# Patient Record
Sex: FEMALE | Race: BLACK OR AFRICAN AMERICAN | Employment: FULL TIME | URBAN - METROPOLITAN AREA
[De-identification: names, ages, dates, MRNs, and addresses within clinical notes are randomized per-mention and may not be internally consistent; named-entity substitution may affect disease eponyms.]

---

## 2017-02-10 PROBLEM — O09.899 H/O PRETERM DELIVERY, CURRENTLY PREGNANT: Status: ACTIVE | Noted: 2017-02-10

## 2017-03-10 PROBLEM — O34.32 CERVICAL FUNNELING AFFECTING PREGNANCY IN SECOND TRIMESTER: Status: ACTIVE | Noted: 2017-03-10

## 2017-03-10 PROBLEM — O26.872 SHORT CERVIX DURING PREGNANCY IN SECOND TRIMESTER: Status: ACTIVE | Noted: 2017-03-10

## 2017-03-10 PROBLEM — O34.219 PREVIOUS CESAREAN SECTION COMPLICATING PREGNANCY, ANTEPARTUM CONDITION OR COMPLICATION: Status: ACTIVE | Noted: 2017-03-10

## 2017-03-10 PROBLEM — O47.00 PREMATURE UTERINE CONTRACTIONS: Status: ACTIVE | Noted: 2017-03-10

## 2017-04-07 PROBLEM — O09.299 NEONATAL DEATH IN PRIOR PREGNANCY, CURRENTLY PREGNANT: Status: ACTIVE | Noted: 2017-04-07

## 2017-04-14 PROBLEM — O26.873 SHORT CERVIX DURING PREGNANCY IN THIRD TRIMESTER: Status: ACTIVE | Noted: 2017-04-14

## 2017-04-14 PROBLEM — O34.32 CERVICAL FUNNELING AFFECTING PREGNANCY IN SECOND TRIMESTER: Status: ACTIVE | Noted: 2017-04-14

## 2017-04-14 PROBLEM — O47.00 PREMATURE UTERINE CONTRACTIONS CAUSING THREATENED PREMATURE LABOR: Status: ACTIVE | Noted: 2017-04-14

## 2017-05-12 PROBLEM — O26.873 SHORT CERVIX, THIRD TRIMESTER: Status: ACTIVE | Noted: 2017-05-12

## 2018-06-26 ENCOUNTER — APPOINTMENT (OUTPATIENT)
Dept: ULTRASOUND IMAGING | Age: 23
End: 2018-06-26
Payer: COMMERCIAL

## 2018-06-26 ENCOUNTER — HOSPITAL ENCOUNTER (EMERGENCY)
Age: 23
Discharge: HOME | End: 2018-06-27
Payer: COMMERCIAL

## 2018-06-26 DIAGNOSIS — O03.4 INCOMPLETE ABORTION: Primary | ICD-10-CM

## 2018-06-26 LAB
ABO/RH: NORMAL
ALBUMIN SERPL-MCNC: 4.6 G/DL (ref 3.5–5.2)
ALP BLD-CCNC: 167 U/L (ref 35–104)
ALT SERPL-CCNC: 11 U/L (ref 0–32)
ANION GAP SERPL CALCULATED.3IONS-SCNC: 14 MMOL/L (ref 7–16)
AST SERPL-CCNC: 18 U/L (ref 0–31)
BASOPHILS ABSOLUTE: 0.04 E9/L (ref 0–0.2)
BASOPHILS RELATIVE PERCENT: 0.5 % (ref 0–2)
BILIRUB SERPL-MCNC: <0.2 MG/DL (ref 0–1.2)
BUN BLDV-MCNC: 11 MG/DL (ref 6–20)
CALCIUM SERPL-MCNC: 9.1 MG/DL (ref 8.6–10.2)
CHLORIDE BLD-SCNC: 100 MMOL/L (ref 98–107)
CO2: 24 MMOL/L (ref 22–29)
CREAT SERPL-MCNC: 0.8 MG/DL (ref 0.5–1)
EOSINOPHILS ABSOLUTE: 0.08 E9/L (ref 0.05–0.5)
EOSINOPHILS RELATIVE PERCENT: 1.1 % (ref 0–6)
GFR AFRICAN AMERICAN: >60
GFR NON-AFRICAN AMERICAN: >60 ML/MIN/1.73
GLUCOSE BLD-MCNC: 100 MG/DL (ref 74–109)
GONADOTROPIN, CHORIONIC (HCG) QUANT: 3113 MIU/ML
HCT VFR BLD CALC: 36.6 % (ref 34–48)
HEMOGLOBIN: 12.4 G/DL (ref 11.5–15.5)
IMMATURE GRANULOCYTES #: 0.02 E9/L
IMMATURE GRANULOCYTES %: 0.3 % (ref 0–5)
LYMPHOCYTES ABSOLUTE: 2.74 E9/L (ref 1.5–4)
LYMPHOCYTES RELATIVE PERCENT: 37.6 % (ref 20–42)
MCH RBC QN AUTO: 29.5 PG (ref 26–35)
MCHC RBC AUTO-ENTMCNC: 33.9 % (ref 32–34.5)
MCV RBC AUTO: 87.1 FL (ref 80–99.9)
MONOCYTES ABSOLUTE: 0.41 E9/L (ref 0.1–0.95)
MONOCYTES RELATIVE PERCENT: 5.6 % (ref 2–12)
NEUTROPHILS ABSOLUTE: 3.99 E9/L (ref 1.8–7.3)
NEUTROPHILS RELATIVE PERCENT: 54.9 % (ref 43–80)
PDW BLD-RTO: 14.6 FL (ref 11.5–15)
PLATELET # BLD: 257 E9/L (ref 130–450)
PMV BLD AUTO: 9.5 FL (ref 7–12)
POTASSIUM SERPL-SCNC: 4 MMOL/L (ref 3.5–5)
RBC # BLD: 4.2 E12/L (ref 3.5–5.5)
SODIUM BLD-SCNC: 138 MMOL/L (ref 132–146)
TOTAL PROTEIN: 8 G/DL (ref 6.4–8.3)
WBC # BLD: 7.3 E9/L (ref 4.5–11.5)

## 2018-06-26 PROCEDURE — 86901 BLOOD TYPING SEROLOGIC RH(D): CPT

## 2018-06-26 PROCEDURE — 84702 CHORIONIC GONADOTROPIN TEST: CPT

## 2018-06-26 PROCEDURE — 85025 COMPLETE CBC W/AUTO DIFF WBC: CPT

## 2018-06-26 PROCEDURE — 36415 COLL VENOUS BLD VENIPUNCTURE: CPT

## 2018-06-26 PROCEDURE — 80053 COMPREHEN METABOLIC PANEL: CPT

## 2018-06-26 PROCEDURE — 99284 EMERGENCY DEPT VISIT MOD MDM: CPT

## 2018-06-26 PROCEDURE — 96374 THER/PROPH/DIAG INJ IV PUSH: CPT

## 2018-06-26 PROCEDURE — 76817 TRANSVAGINAL US OBSTETRIC: CPT

## 2018-06-26 PROCEDURE — 2580000003 HC RX 258: Performed by: NURSE PRACTITIONER

## 2018-06-26 PROCEDURE — 86900 BLOOD TYPING SEROLOGIC ABO: CPT

## 2018-06-26 PROCEDURE — 96375 TX/PRO/DX INJ NEW DRUG ADDON: CPT

## 2018-06-26 PROCEDURE — 6360000002 HC RX W HCPCS: Performed by: NURSE PRACTITIONER

## 2018-06-26 RX ORDER — DIPHENHYDRAMINE HYDROCHLORIDE 50 MG/ML
12.5 INJECTION INTRAMUSCULAR; INTRAVENOUS ONCE
Status: DISCONTINUED | OUTPATIENT
Start: 2018-06-26 | End: 2018-06-27 | Stop reason: HOSPADM

## 2018-06-26 RX ORDER — MORPHINE SULFATE 2 MG/ML
2 INJECTION, SOLUTION INTRAMUSCULAR; INTRAVENOUS ONCE
Status: DISCONTINUED | OUTPATIENT
Start: 2018-06-26 | End: 2018-06-26

## 2018-06-26 RX ORDER — 0.9 % SODIUM CHLORIDE 0.9 %
1000 INTRAVENOUS SOLUTION INTRAVENOUS ONCE
Status: COMPLETED | OUTPATIENT
Start: 2018-06-26 | End: 2018-06-26

## 2018-06-26 RX ORDER — ONDANSETRON 2 MG/ML
4 INJECTION INTRAMUSCULAR; INTRAVENOUS ONCE
Status: DISCONTINUED | OUTPATIENT
Start: 2018-06-26 | End: 2018-06-26

## 2018-06-26 RX ORDER — MORPHINE SULFATE 4 MG/ML
4 INJECTION, SOLUTION INTRAMUSCULAR; INTRAVENOUS ONCE
Status: COMPLETED | OUTPATIENT
Start: 2018-06-26 | End: 2018-06-26

## 2018-06-26 RX ORDER — ONDANSETRON 2 MG/ML
4 INJECTION INTRAMUSCULAR; INTRAVENOUS ONCE
Status: COMPLETED | OUTPATIENT
Start: 2018-06-26 | End: 2018-06-26

## 2018-06-26 RX ADMIN — ONDANSETRON 4 MG: 2 INJECTION INTRAMUSCULAR; INTRAVENOUS at 23:53

## 2018-06-26 RX ADMIN — SODIUM CHLORIDE 1000 ML: 9 INJECTION, SOLUTION INTRAVENOUS at 21:08

## 2018-06-26 RX ADMIN — MORPHINE SULFATE 4 MG: 4 INJECTION INTRAVENOUS at 23:53

## 2018-06-26 ASSESSMENT — PAIN DESCRIPTION - LOCATION: LOCATION: ABDOMEN

## 2018-06-26 ASSESSMENT — PAIN SCALES - GENERAL
PAINLEVEL_OUTOF10: 10
PAINLEVEL_OUTOF10: 10

## 2018-06-26 ASSESSMENT — PAIN DESCRIPTION - ORIENTATION: ORIENTATION: LOWER

## 2018-06-26 ASSESSMENT — PAIN DESCRIPTION - PAIN TYPE: TYPE: ACUTE PAIN

## 2018-06-27 VITALS
HEART RATE: 83 BPM | OXYGEN SATURATION: 100 % | TEMPERATURE: 98.3 F | SYSTOLIC BLOOD PRESSURE: 125 MMHG | DIASTOLIC BLOOD PRESSURE: 88 MMHG | RESPIRATION RATE: 16 BRPM

## 2018-06-27 PROCEDURE — 6370000000 HC RX 637 (ALT 250 FOR IP): Performed by: NURSE PRACTITIONER

## 2018-06-27 PROCEDURE — 6360000002 HC RX W HCPCS: Performed by: NURSE PRACTITIONER

## 2018-06-27 PROCEDURE — 96372 THER/PROPH/DIAG INJ SC/IM: CPT

## 2018-06-27 PROCEDURE — 96376 TX/PRO/DX INJ SAME DRUG ADON: CPT

## 2018-06-27 RX ORDER — MISOPROSTOL 200 UG/1
200 TABLET ORAL ONCE
Status: COMPLETED | OUTPATIENT
Start: 2018-06-27 | End: 2018-06-27

## 2018-06-27 RX ORDER — MORPHINE SULFATE 2 MG/ML
2 INJECTION, SOLUTION INTRAMUSCULAR; INTRAVENOUS ONCE
Status: COMPLETED | OUTPATIENT
Start: 2018-06-27 | End: 2018-06-27

## 2018-06-27 RX ORDER — MORPHINE SULFATE 2 MG/ML
INJECTION, SOLUTION INTRAMUSCULAR; INTRAVENOUS
Status: DISCONTINUED
Start: 2018-06-27 | End: 2018-06-27 | Stop reason: HOSPADM

## 2018-06-27 RX ORDER — OXYCODONE HYDROCHLORIDE AND ACETAMINOPHEN 5; 325 MG/1; MG/1
1 TABLET ORAL ONCE
Status: COMPLETED | OUTPATIENT
Start: 2018-06-27 | End: 2018-06-27

## 2018-06-27 RX ORDER — METHYLERGONOVINE MALEATE 0.2 MG/ML
200 INJECTION INTRAVENOUS ONCE
Status: COMPLETED | OUTPATIENT
Start: 2018-06-27 | End: 2018-06-27

## 2018-06-27 RX ORDER — ONDANSETRON 2 MG/ML
4 INJECTION INTRAMUSCULAR; INTRAVENOUS ONCE
Status: COMPLETED | OUTPATIENT
Start: 2018-06-27 | End: 2018-06-27

## 2018-06-27 RX ADMIN — Medication 200 MCG: at 01:12

## 2018-06-27 RX ADMIN — MORPHINE SULFATE 2 MG: 2 INJECTION, SOLUTION INTRAMUSCULAR; INTRAVENOUS at 02:45

## 2018-06-27 RX ADMIN — MORPHINE SULFATE 2 MG: 2 INJECTION, SOLUTION INTRAMUSCULAR; INTRAVENOUS at 02:09

## 2018-06-27 RX ADMIN — OXYCODONE HYDROCHLORIDE AND ACETAMINOPHEN 1 TABLET: 5; 325 TABLET ORAL at 03:47

## 2018-06-27 RX ADMIN — ONDANSETRON 4 MG: 2 INJECTION INTRAMUSCULAR; INTRAVENOUS at 02:09

## 2018-06-27 RX ADMIN — Medication 200 MCG: at 01:11

## 2018-06-27 ASSESSMENT — PAIN SCALES - GENERAL
PAINLEVEL_OUTOF10: 10
PAINLEVEL_OUTOF10: 10
PAINLEVEL_OUTOF10: 0
PAINLEVEL_OUTOF10: 10

## 2019-02-06 ENCOUNTER — ROUTINE PRENATAL (OUTPATIENT)
Dept: OBGYN CLINIC | Age: 24
End: 2019-02-06
Payer: COMMERCIAL

## 2019-02-06 VITALS
DIASTOLIC BLOOD PRESSURE: 64 MMHG | HEART RATE: 81 BPM | WEIGHT: 117 LBS | BODY MASS INDEX: 21.4 KG/M2 | SYSTOLIC BLOOD PRESSURE: 105 MMHG

## 2019-02-06 DIAGNOSIS — Z36.89 ENCOUNTER FOR FETAL ANATOMIC SURVEY: ICD-10-CM

## 2019-02-06 DIAGNOSIS — O34.32 CERVICAL FUNNELING AFFECTING PREGNANCY IN SECOND TRIMESTER: ICD-10-CM

## 2019-02-06 DIAGNOSIS — O34.219 PREVIOUS CESAREAN SECTION COMPLICATING PREGNANCY, ANTEPARTUM CONDITION OR COMPLICATION: ICD-10-CM

## 2019-02-06 DIAGNOSIS — O26.872 SHORT CERVIX DURING PREGNANCY IN SECOND TRIMESTER: Primary | ICD-10-CM

## 2019-02-06 DIAGNOSIS — Z34.90 PREGNANCY, UNSPECIFIED GESTATIONAL AGE: ICD-10-CM

## 2019-02-06 DIAGNOSIS — O09.899 H/O PRETERM DELIVERY, CURRENTLY PREGNANT: ICD-10-CM

## 2019-02-06 DIAGNOSIS — O09.299 NEONATAL DEATH IN PRIOR PREGNANCY, CURRENTLY PREGNANT: ICD-10-CM

## 2019-02-06 LAB
GLUCOSE URINE, POC: NORMAL
PROTEIN UA: ABNORMAL

## 2019-02-06 PROCEDURE — G8420 CALC BMI NORM PARAMETERS: HCPCS | Performed by: OBSTETRICS & GYNECOLOGY

## 2019-02-06 PROCEDURE — 76811 OB US DETAILED SNGL FETUS: CPT | Performed by: OBSTETRICS & GYNECOLOGY

## 2019-02-06 PROCEDURE — 81002 URINALYSIS NONAUTO W/O SCOPE: CPT | Performed by: OBSTETRICS & GYNECOLOGY

## 2019-02-06 PROCEDURE — G8484 FLU IMMUNIZE NO ADMIN: HCPCS | Performed by: OBSTETRICS & GYNECOLOGY

## 2019-02-06 PROCEDURE — 76817 TRANSVAGINAL US OBSTETRIC: CPT | Performed by: OBSTETRICS & GYNECOLOGY

## 2019-02-06 PROCEDURE — G8427 DOCREV CUR MEDS BY ELIG CLIN: HCPCS | Performed by: OBSTETRICS & GYNECOLOGY

## 2019-02-06 PROCEDURE — 99243 OFF/OP CNSLTJ NEW/EST LOW 30: CPT | Performed by: OBSTETRICS & GYNECOLOGY

## 2019-02-06 PROCEDURE — 99201 HC NEW PT, E/M LEVEL 1: CPT | Performed by: OBSTETRICS & GYNECOLOGY

## 2019-02-06 RX ORDER — FOLIC ACID 1 MG/1
TABLET ORAL
Refills: 0 | COMMUNITY
Start: 2018-10-31

## 2019-02-08 ENCOUNTER — TELEPHONE (OUTPATIENT)
Dept: OBGYN CLINIC | Age: 24
End: 2019-02-08

## 2019-02-19 ENCOUNTER — TELEPHONE (OUTPATIENT)
Dept: OBGYN CLINIC | Age: 24
End: 2019-02-19

## 2019-02-19 ENCOUNTER — ROUTINE PRENATAL (OUTPATIENT)
Dept: OBGYN CLINIC | Age: 24
End: 2019-02-19
Payer: COMMERCIAL

## 2019-02-19 VITALS
HEART RATE: 80 BPM | DIASTOLIC BLOOD PRESSURE: 64 MMHG | WEIGHT: 120 LBS | BODY MASS INDEX: 21.95 KG/M2 | SYSTOLIC BLOOD PRESSURE: 110 MMHG

## 2019-02-19 DIAGNOSIS — O34.32 CERVICAL FUNNELING AFFECTING PREGNANCY IN SECOND TRIMESTER: ICD-10-CM

## 2019-02-19 DIAGNOSIS — O09.899 H/O PRETERM DELIVERY, CURRENTLY PREGNANT: ICD-10-CM

## 2019-02-19 DIAGNOSIS — Z3A.23 23 WEEKS GESTATION OF PREGNANCY: ICD-10-CM

## 2019-02-19 DIAGNOSIS — O26.872 SHORT CERVIX DURING PREGNANCY IN SECOND TRIMESTER: Primary | ICD-10-CM

## 2019-02-19 DIAGNOSIS — O09.299 NEONATAL DEATH IN PRIOR PREGNANCY, CURRENTLY PREGNANT: ICD-10-CM

## 2019-02-19 DIAGNOSIS — O34.219 PREVIOUS CESAREAN SECTION COMPLICATING PREGNANCY, ANTEPARTUM CONDITION OR COMPLICATION: ICD-10-CM

## 2019-02-19 LAB
GLUCOSE URINE, POC: NORMAL
PROTEIN UA: ABNORMAL

## 2019-02-19 PROCEDURE — 76816 OB US FOLLOW-UP PER FETUS: CPT | Performed by: OBSTETRICS & GYNECOLOGY

## 2019-02-19 PROCEDURE — 1036F TOBACCO NON-USER: CPT | Performed by: OBSTETRICS & GYNECOLOGY

## 2019-02-19 PROCEDURE — G8484 FLU IMMUNIZE NO ADMIN: HCPCS | Performed by: OBSTETRICS & GYNECOLOGY

## 2019-02-19 PROCEDURE — G8427 DOCREV CUR MEDS BY ELIG CLIN: HCPCS | Performed by: OBSTETRICS & GYNECOLOGY

## 2019-02-19 PROCEDURE — 99213 OFFICE O/P EST LOW 20 MIN: CPT | Performed by: OBSTETRICS & GYNECOLOGY

## 2019-02-19 PROCEDURE — 81002 URINALYSIS NONAUTO W/O SCOPE: CPT | Performed by: OBSTETRICS & GYNECOLOGY

## 2019-02-19 PROCEDURE — 76817 TRANSVAGINAL US OBSTETRIC: CPT | Performed by: OBSTETRICS & GYNECOLOGY

## 2019-02-19 PROCEDURE — G8420 CALC BMI NORM PARAMETERS: HCPCS | Performed by: OBSTETRICS & GYNECOLOGY

## 2019-02-19 PROCEDURE — 99211 OFF/OP EST MAY X REQ PHY/QHP: CPT | Performed by: OBSTETRICS & GYNECOLOGY

## 2019-03-20 ENCOUNTER — ROUTINE PRENATAL (OUTPATIENT)
Dept: OBGYN CLINIC | Age: 24
End: 2019-03-20
Payer: COMMERCIAL

## 2019-03-20 VITALS
SYSTOLIC BLOOD PRESSURE: 99 MMHG | BODY MASS INDEX: 22.63 KG/M2 | WEIGHT: 123 LBS | DIASTOLIC BLOOD PRESSURE: 64 MMHG | HEIGHT: 62 IN | HEART RATE: 78 BPM

## 2019-03-20 DIAGNOSIS — O34.32 CERVICAL FUNNELING AFFECTING PREGNANCY IN SECOND TRIMESTER: Primary | ICD-10-CM

## 2019-03-20 DIAGNOSIS — O09.899 H/O PRETERM DELIVERY, CURRENTLY PREGNANT: ICD-10-CM

## 2019-03-20 DIAGNOSIS — O34.219 PREVIOUS CESAREAN SECTION COMPLICATING PREGNANCY, ANTEPARTUM CONDITION OR COMPLICATION: ICD-10-CM

## 2019-03-20 DIAGNOSIS — Z3A.27 27 WEEKS GESTATION OF PREGNANCY: ICD-10-CM

## 2019-03-20 DIAGNOSIS — O26.872 SHORT CERVIX DURING PREGNANCY IN SECOND TRIMESTER: ICD-10-CM

## 2019-03-20 DIAGNOSIS — Z36.89 ENCOUNTER FOR FETAL ANATOMIC SURVEY: ICD-10-CM

## 2019-03-20 DIAGNOSIS — O09.299 NEONATAL DEATH IN PRIOR PREGNANCY, CURRENTLY PREGNANT: ICD-10-CM

## 2019-03-20 LAB
GLUCOSE URINE, POC: NORMAL
PROTEIN UA: ABNORMAL

## 2019-03-20 PROCEDURE — 99211 OFF/OP EST MAY X REQ PHY/QHP: CPT | Performed by: OBSTETRICS & GYNECOLOGY

## 2019-03-20 PROCEDURE — 76819 FETAL BIOPHYS PROFIL W/O NST: CPT | Performed by: OBSTETRICS & GYNECOLOGY

## 2019-03-20 PROCEDURE — 76817 TRANSVAGINAL US OBSTETRIC: CPT | Performed by: OBSTETRICS & GYNECOLOGY

## 2019-03-20 PROCEDURE — 81002 URINALYSIS NONAUTO W/O SCOPE: CPT | Performed by: OBSTETRICS & GYNECOLOGY

## 2019-03-20 PROCEDURE — G8420 CALC BMI NORM PARAMETERS: HCPCS | Performed by: OBSTETRICS & GYNECOLOGY

## 2019-03-20 PROCEDURE — G8484 FLU IMMUNIZE NO ADMIN: HCPCS | Performed by: OBSTETRICS & GYNECOLOGY

## 2019-03-20 PROCEDURE — 76805 OB US >/= 14 WKS SNGL FETUS: CPT | Performed by: OBSTETRICS & GYNECOLOGY

## 2019-03-20 PROCEDURE — 1036F TOBACCO NON-USER: CPT | Performed by: OBSTETRICS & GYNECOLOGY

## 2019-03-20 PROCEDURE — 99213 OFFICE O/P EST LOW 20 MIN: CPT | Performed by: OBSTETRICS & GYNECOLOGY

## 2019-03-20 PROCEDURE — G8427 DOCREV CUR MEDS BY ELIG CLIN: HCPCS | Performed by: OBSTETRICS & GYNECOLOGY

## 2019-03-28 ENCOUNTER — TELEPHONE (OUTPATIENT)
Dept: OBGYN CLINIC | Age: 24
End: 2019-03-28

## 2019-04-03 ENCOUNTER — ROUTINE PRENATAL (OUTPATIENT)
Dept: OBGYN CLINIC | Age: 24
End: 2019-04-03
Payer: COMMERCIAL

## 2019-04-03 VITALS
HEIGHT: 62 IN | SYSTOLIC BLOOD PRESSURE: 104 MMHG | HEART RATE: 89 BPM | DIASTOLIC BLOOD PRESSURE: 69 MMHG | WEIGHT: 125 LBS | BODY MASS INDEX: 23 KG/M2

## 2019-04-03 DIAGNOSIS — O09.899 H/O PRETERM DELIVERY, CURRENTLY PREGNANT: ICD-10-CM

## 2019-04-03 DIAGNOSIS — O34.219 PREVIOUS CESAREAN SECTION COMPLICATING PREGNANCY, ANTEPARTUM CONDITION OR COMPLICATION: ICD-10-CM

## 2019-04-03 DIAGNOSIS — O09.299 NEONATAL DEATH IN PRIOR PREGNANCY, CURRENTLY PREGNANT: Primary | ICD-10-CM

## 2019-04-03 DIAGNOSIS — O26.872 SHORT CERVIX DURING PREGNANCY IN SECOND TRIMESTER: ICD-10-CM

## 2019-04-03 DIAGNOSIS — O34.32 CERVICAL FUNNELING AFFECTING PREGNANCY IN SECOND TRIMESTER: ICD-10-CM

## 2019-04-03 DIAGNOSIS — Z3A.29 29 WEEKS GESTATION OF PREGNANCY: ICD-10-CM

## 2019-04-03 LAB
GLUCOSE URINE, POC: NORMAL
PROTEIN UA: ABNORMAL

## 2019-04-03 PROCEDURE — 76816 OB US FOLLOW-UP PER FETUS: CPT | Performed by: OBSTETRICS & GYNECOLOGY

## 2019-04-03 PROCEDURE — G8427 DOCREV CUR MEDS BY ELIG CLIN: HCPCS | Performed by: OBSTETRICS & GYNECOLOGY

## 2019-04-03 PROCEDURE — 76818 FETAL BIOPHYS PROFILE W/NST: CPT | Performed by: OBSTETRICS & GYNECOLOGY

## 2019-04-03 PROCEDURE — 81002 URINALYSIS NONAUTO W/O SCOPE: CPT | Performed by: OBSTETRICS & GYNECOLOGY

## 2019-04-03 PROCEDURE — 76817 TRANSVAGINAL US OBSTETRIC: CPT | Performed by: OBSTETRICS & GYNECOLOGY

## 2019-04-03 PROCEDURE — 76820 UMBILICAL ARTERY ECHO: CPT | Performed by: OBSTETRICS & GYNECOLOGY

## 2019-04-03 PROCEDURE — 99213 OFFICE O/P EST LOW 20 MIN: CPT | Performed by: OBSTETRICS & GYNECOLOGY

## 2019-04-03 PROCEDURE — 99211 OFF/OP EST MAY X REQ PHY/QHP: CPT | Performed by: OBSTETRICS & GYNECOLOGY

## 2019-04-03 PROCEDURE — 1036F TOBACCO NON-USER: CPT | Performed by: OBSTETRICS & GYNECOLOGY

## 2019-04-03 PROCEDURE — G8420 CALC BMI NORM PARAMETERS: HCPCS | Performed by: OBSTETRICS & GYNECOLOGY

## 2019-04-03 NOTE — PATIENT INSTRUCTIONS
Call your primary obstetrician with bleeding, leaking of fluid, abdominal tenderness, headache, blurry vision, epigastric pain and increased urinary frequency. Any questions contact [facility] at [phone]. If you are experiencing an emergency and need immediate help, call 911 or go to go emergency room or labor and delivery. Do kick counts after dinner. Call your primary obstetrician if less than 10 kicks in 2 hours after dinner. Call your primary obstetrician with bleeding, leaking of fluid, abdominal tenderness, headache, blurry vision, epigastric pain and increased urinary frequency. Patient Education        Weeks 26 to 30 of Your Pregnancy: Care Instructions  Your Care Instructions    You are now in your last trimester of pregnancy. Your baby is growing rapidly. And you'll probably feel your baby moving around more often. Your doctor may ask you to count your baby's kicks. Your back may ache as your body gets used to your baby's size and length. If you haven't already had the Tdap shot during this pregnancy, talk to your doctor about getting it. It will help protect your  against pertussis infection. During this time, it's important to take care of yourself and pay attention to what your body needs. If you feel sexual, explore ways to be close with your partner that match your comfort and desire. Use the tips provided in this care sheet to find ways to be sexual in your own way. Follow-up care is a key part of your treatment and safety. Be sure to make and go to all appointments, and call your doctor if you are having problems. It's also a good idea to know your test results and keep a list of the medicines you take. How can you care for yourself at home? Take it easy at work  · Take frequent breaks. If possible, stop working when you are tired, and rest during your lunch hour. · Take bathroom breaks every 2 hours. · Change positions often.  If you sit for long periods, stand up and walk around. · When you stand for a long time, keep one foot on a low stool with your knee bent. After standing a lot, sit with your feet up. · Avoid fumes, chemicals, and tobacco smoke. Be sexual in your own way  · Having sex during pregnancy is okay, unless your doctor tells you not to. · You may be very interested in sex, or you may have no interest at all. · Your growing belly can make it hard to find a good position during intercourse. Combine and explore. · You may get cramps in your uterus when your partner touches your breasts. · A back rub may relieve the backache or cramps that sometimes follow orgasm. Learn about  labor  · Watch for signs of  labor. You may be going into labor if:  ? You have menstrual-like cramps, with or without nausea. ? You have about 6 or more contractions in 1 hour, even after you have had a glass of water and are resting. ? You have a low, dull backache that does not go away when you change your position. ? You have pain or pressure in your pelvis that comes and goes in a pattern. ? You have intestinal cramping or flu-like symptoms, with or without diarrhea.  ? You notice an increase or change in your vaginal discharge. Discharge may be heavy, mucus-like, watery, or streaked with blood. ? Your water breaks. · If you think you have  labor:  ? Drink 2 or 3 glasses of water or juice. Not drinking enough fluids can cause contractions. ? Stop what you are doing, and empty your bladder. Then lie down on your left side for at least 1 hour. ? While lying on your side, find your breast bone. Put your fingers in the soft spot just below it. Move your fingers down toward your belly button to find the top of your uterus. Check to see if it is tight. ? Contractions can be weak or strong. Record your contractions for an hour.  Time a contraction from the start of one contraction to the start of the next one.  ? Single or several strong contractions without a pattern are called Sulaiman-Santamaria contractions. They are practice contractions but not the start of labor. They often stop if you change what you are doing. ? Call your doctor if you have regular contractions. Where can you learn more? Go to https://tavia.healtheSKY.pl. org and sign in to your [email or web address] account. Enter R264 in the [facility] box to learn more about \"Weeks 26 to 30 of Your Pregnancy: Care Instructions. \"     If you do not have an account, please click on the \"Sign Up Now\" link. Current as of: September 5, 2018  Content Version: 11.9  © 7604-8148 [employer]. Care instructions adapted under license by [facility] ([facility]). If you have questions about a medical condition or this instruction, always ask your healthcare professional. [address] any warranty or liability for your use of this information. Patient Education        Learning About When to Call Your Doctor During Pregnancy (After 20 Weeks)  Your Care Instructions  It's common to have concerns about what might be a problem during pregnancy. Although most pregnant women don't have any serious problems, it's important to know when to call your doctor if you have certain symptoms or signs of labor. These are general suggestions. Your doctor may give you some more information about when to call. When to call your doctor (after 20 weeks)  Call 911 anytime you think you may need emergency care. For example, call if:  · You have severe vaginal bleeding. · You have sudden, severe pain in your belly. · You passed out (lost consciousness). · You have a seizure. · You see or feel the umbilical cord. · You think you are about to deliver your baby and can't make it safely to the hospital.  Call your doctor now or seek immediate medical care if:  · You have vaginal bleeding. · You have belly pain. · You have a fever.   · You have symptoms of preeclampsia, such as:  ? Sudden swelling of your face, hands, or feet. ? New vision problems (such as dimness or blurring). ? A severe headache. · You have a sudden release of fluid from your vagina. (You think your water broke.)  · You think that you may be in labor. This means that you've had at least 4 contractions within 20 minutes or at least 8 contractions in an hour. · You notice that your baby has stopped moving or is moving much less than normal.  · You have symptoms of a urinary tract infection. These may include:  ? Pain or burning when you urinate. ? A frequent need to urinate without being able to pass much urine. ? Pain in the flank, which is just below the rib cage and above the waist on either side of the back. ? Blood in your urine. Watch closely for changes in your health, and be sure to contact your doctor if:  · You have vaginal discharge that smells bad. · You have skin changes, such as:  ? A rash. ? Itching. ? Yellow color to your skin. · You have other concerns about your pregnancy. If you have labor signs at 37 weeks or more  If you have signs of labor at 37 weeks or more, your doctor may tell you to call when your labor becomes more active. Symptoms of active labor include:  · Contractions that are regular. · Contractions that are less than 5 minutes apart. · Contractions that are hard to talk through. Follow-up care is a key part of your treatment and safety. Be sure to make and go to all appointments, and call your doctor if you are having problems. It's also a good idea to know your test results and keep a list of the medicines you take. Where can you learn more? Go to https://letsmote.comjuhi.X-1. org and sign in to your [employer] account. Enter  in the [facility] box to learn more about \"Learning About When to Call Your Doctor During Pregnancy (After 20 Weeks). \"     If you do not have an account, please click on the \"Sign Up Now\" link.   Current as of: September 5, 2018  Content Version: 11.9  © 1540-4169 [employer]. Care instructions adapted under license by [facility] [place] ([facility]). If you have questions about a medical condition or this instruction, always ask your healthcare professional. [address] any warranty or liability for your use of this information. Patient Education        Counting Your Baby's Kicks: Care Instructions  Your Care Instructions    Counting your baby's kicks is one way your doctor can tell that your baby is healthy. Most women--especially in a first pregnancy--feel their baby move for the first time between 16 and 22 weeks. The movement may feel like flutters rather than kicks. Your baby may move more at certain times of the day. When you are active, you may notice less kicking than when you are resting. At your prenatal visits, your doctor will ask whether the baby is active. In your last trimester, your doctor may ask you to count the number of times you feel your baby move. Follow-up care is a key part of your treatment and safety. Be sure to make and go to all appointments, and call your doctor if you are having problems. It's also a good idea to know your test results and keep a list of the medicines you take. How do you count fetal kicks? · A common method of checking your baby's movement is to count the number of kicks or moves you feel in 1 hour. Ten movements (such as kicks, flutters, or rolls) in 1 hour are normal. Some doctors suggest that you count in the morning until you get to 10 movements. Then you can quit for that day and start again the next day. · Pick your baby's most active time of day to count. This may be any time from morning to evening. · If you do not feel 10 movements in an hour, your baby may be sleeping. Wait for the next hour and count again. When should you call for help?   Call your doctor now or seek immediate medical care if:    · You noticed that your baby has stopped moving or is moving much less than

## 2019-04-03 NOTE — PROGRESS NOTES
NST completed. Baseline 140 with moderate variability+ accelelrastions. Occasional variable decelelration+ ctx noted.  Reactive per dr Nikolas Carmona

## 2019-04-03 NOTE — PROGRESS NOTES
No c/o. Good fetal movement. Instructed on kicl counts.  Denies bleeding,lof,cctxAll questions answered+ information confirmed by pt

## 2019-04-03 NOTE — LETTER
A fetal ultrasound assessment was performed.  A living garibay intrauterine fetus was present in the cephalic presentation with normal fetal heart motion and normal fetal motion noted.  The estimated fetal weight was at the 35th growth percentile.  The biometric measurements were consistent with the patient's established dating parameters. The biophysical profile and cord Doppler studies were both reassuring.   There was no absence or reversal of end-diastolic flow.                                GENETIC SCREENING/TERATOLOGY COUNSELING                            (Includes patient, FTB, and any affected family members)     Patient Age > 35 Years NO   Thalassemia ( MVC<80) NO   Congential Heart Defect NO   Neural Tube Defect NO   Rich-Sachs NO   Sickle Cell Disease NO   Sickle Cell Trait NO   Sickle C Disease or Trait NO   Hemophilia NO   Muscular Dystrophy NO   Cystic Fibrosis NO   Rosewood Disease NO   Autism NO   Mental Retardation NO   History of Fragile X NO   Maternal Diabetes NO   Other Genetic Disease or Syndrome NO   Previous Child With Congenital Abnormality Not Listed NO   Recreational Drugs NO                                                   INFECTION HISTORY          HEPATITIS IMMUNIZED:  YES   HEPATITIS INFECTION:  NO   EXPOSURE TO TB NO   GENITAL HERPES    NO   PARVOVIRUS B-19 NO   CHICKEN POX  NO   MEASLES NO   STD NO   HIV NO   OTHER RASH OR VIRAL ILLNESS SINCE LMP NO   UTI RECURRENT NO   HPV NO         OB History    Para Term  AB Living   5 3 2 1 1 2   SAB TAB Ectopic Molar Multiple Live Births   1         3       # Outcome Date GA Lbr Oziel/2nd Weight Sex Delivery Anes PTL Lv   5 Current                     4 SAB                    3 Term /17 37w0d   5 lb 11 oz (2.58 kg) M  EPI N JESSICA   2  13 23w6d 02:55 1 lb 10.1 oz (0.74 kg) M CS-LTranv   Y ND      Birth Comments: 3 wk nicu, passed after 3 wks

## 2019-04-04 ENCOUNTER — TELEPHONE (OUTPATIENT)
Dept: OBGYN CLINIC | Age: 24
End: 2019-04-04

## 2019-04-08 ENCOUNTER — TELEPHONE (OUTPATIENT)
Dept: OBGYN CLINIC | Age: 24
End: 2019-04-08

## 2019-04-08 NOTE — TELEPHONE ENCOUNTER
Called to see if patient received medication from [facility], as [facility] stated that are having difficulty going to patient for nurse visit.   Message left  For Werner Vasques to call LINDA

## 2019-04-10 ENCOUNTER — TELEPHONE (OUTPATIENT)
Dept: OBGYN CLINIC | Age: 24
End: 2019-04-10

## 2019-04-11 ENCOUNTER — ROUTINE PRENATAL (OUTPATIENT)
Dept: OBGYN CLINIC | Age: 24
End: 2019-04-11
Payer: COMMERCIAL

## 2019-04-11 ENCOUNTER — TELEPHONE (OUTPATIENT)
Dept: OBGYN CLINIC | Age: 24
End: 2019-04-11

## 2019-04-11 VITALS
BODY MASS INDEX: 23.55 KG/M2 | HEIGHT: 62 IN | SYSTOLIC BLOOD PRESSURE: 112 MMHG | HEART RATE: 98 BPM | WEIGHT: 128 LBS | DIASTOLIC BLOOD PRESSURE: 74 MMHG

## 2019-04-11 DIAGNOSIS — O09.213 PREVIOUS PRETERM DELIVERY IN THIRD TRIMESTER, ANTEPARTUM: Primary | ICD-10-CM

## 2019-04-11 DIAGNOSIS — O26.873 SHORT CERVIX, THIRD TRIMESTER: ICD-10-CM

## 2019-04-11 DIAGNOSIS — O34.219 PREVIOUS CESAREAN DELIVERY, ANTEPARTUM CONDITION OR COMPLICATION: ICD-10-CM

## 2019-04-11 DIAGNOSIS — Z3A.30 30 WEEKS GESTATION OF PREGNANCY: ICD-10-CM

## 2019-04-11 LAB
GLUCOSE URINE, POC: NORMAL
PROTEIN UA: ABNORMAL

## 2019-04-11 PROCEDURE — G8420 CALC BMI NORM PARAMETERS: HCPCS | Performed by: OBSTETRICS & GYNECOLOGY

## 2019-04-11 PROCEDURE — 99213 OFFICE O/P EST LOW 20 MIN: CPT | Performed by: OBSTETRICS & GYNECOLOGY

## 2019-04-11 PROCEDURE — 76816 OB US FOLLOW-UP PER FETUS: CPT | Performed by: OBSTETRICS & GYNECOLOGY

## 2019-04-11 PROCEDURE — G8427 DOCREV CUR MEDS BY ELIG CLIN: HCPCS | Performed by: OBSTETRICS & GYNECOLOGY

## 2019-04-11 PROCEDURE — 81002 URINALYSIS NONAUTO W/O SCOPE: CPT | Performed by: OBSTETRICS & GYNECOLOGY

## 2019-04-11 PROCEDURE — 99211 OFF/OP EST MAY X REQ PHY/QHP: CPT | Performed by: OBSTETRICS & GYNECOLOGY

## 2019-04-11 PROCEDURE — 76817 TRANSVAGINAL US OBSTETRIC: CPT | Performed by: OBSTETRICS & GYNECOLOGY

## 2019-04-11 PROCEDURE — 76818 FETAL BIOPHYS PROFILE W/NST: CPT | Performed by: OBSTETRICS & GYNECOLOGY

## 2019-04-11 PROCEDURE — 1036F TOBACCO NON-USER: CPT | Performed by: OBSTETRICS & GYNECOLOGY

## 2019-04-11 NOTE — LETTER
19     RE:  Velasquez Armstrong   : 1995   AGE: 20 y.o. REFERRING PHYSICIAN:    Gladis Michel MD    Dear .  Mrs. Velasquez Armstrong a 20 y.o.  [id number]  is seen today on follow up in our office. REASON FOR APPOINTMENT:  · Follow up on a pregnant patient with previous   delivery at 23 weeks       Prior to Admission medications    Medication Sig Start Date End Date Taking? Authorizing Provider   PROGESTERONE IM Inject 275 mg into the skin once a week   Yes Historical Provider, MD   folic acid (FOLVITE) 1 MG tablet take 1 tablet by mouth three times a day 10/31/18  Yes Historical Provider, MD   aspirin 81 MG tablet Take 81 mg by mouth daily   Yes Historical Provider, MD   Prenatal MV-Min-Fe Fum-FA-DHA (PRENATAL 1 PO) Take by mouth   Yes Historical Provider, MD     INTERVAL HISTORY:  Mrs Velasquez Armstrong had an uneventful course of pregnancy since her last visit to our office. When seen today in our office she had no complaints. PHYSICAL EXAMINATION:  General Appearance:  Healthy looking, alert, no acute distress. Eyes:     No pallor, no icterus, no photophobia. Ears:     No ear drainage. Nose:     No nasal drainage, no paranasal sinus tenderness. Throat:   Mucosa moist, no oral thrush, no exudate. Neck:     No nuchal rigidity. Back:     No CVA tenderness. Abdomen:    Soft nontender. Extremities:    No pretibial pitting edema, no calf muscle tenderness. Skin:     No rashes, no lesions. BP: 112/74 Weight: 128 lb (58.1 kg) Height: 5' 2\" (157.5 cm) Pulse: 98     Body mass index is 23.41 kg/m². Urine dipstick:  Glucose : Negative   Albumin:  1+       An ultrasound evaluation was done in our office today. Please refer to the enclosed copy of the ultrasound report for further information. IMPRESSION:  1. A  30w2d  intrauterine gestation. 2. Previous  delivery at 23 weeks by . 3. History of  demise of the 23 week baby. 4. Shortening of the cervix and funneling.       RECOMMENDATIONS AND PLAN:  I discussed with the patient the following points:    1. The benefits and limitations of ultrasound in prenatal diagnosis. Some defects might not always be seen by ultrasound. Estimated incidence of these defects in the general population is 2- 4%. 2. No structural  anomalies are noted. Only genetic amniocentesis can rule out fetal chromosome anomalies. Normal ultrasound does not. 3. The size of her baby is appropriate for gestational age. 4. Shortening of the cervix and funneling noted on ultrasound evaluation today. 5. Fetal well-being was confirmed today. The amount of fluid around baby is normal.  The Biophysical profile score of 10/10 is reassuring, and the umbilical artery Doppler studies are normal.  6. She should monitor fetal well-being at home by counting movements after dinner. Her baby should  move 10 times in 2 hours; otherwise, she should call your office immediately. She is also to call, if she develops any headaches, blurred vision, abdominal pain, bleeding, or spotting, which are signs of preeclampsia. 7. She should restrict her activity. She is not to lift more than 15Lb weight, and should not be on her feet more than 2hrs per day. She should abstain from sexual relations. 8. She is to continue to follow with you in your office for ongoing obstetric care. 9. She is to continue to be monitored with nonstress test and biophysical profiles as recommended at Dr. Mayelin Meléndez. Thank you again, doctor, for allowing us to be of service to your patient. If I can be of further assistance, please do not hesitate to call. Sincerely,        Espinoza Angeles M.D    Current encounter billing:  [place] OFFICE OUTPATIENT VISIT 15 MINUTES [[id number]]  US OB Follow Up Transabdominal Approach [SMB458 Custom]  Biophysical profile [OBO12 Custom]    **This report has been created using voice recognition software.  It may contain minor errors     which are inherent in voice recognition technology**

## 2019-04-11 NOTE — PROGRESS NOTES
No c/o. Good fetal movement. Kick counts encouraged.  Denies bleeding,lof,ctxAll questions answered+ information confirmed by pt

## 2019-04-11 NOTE — LETTER
NON STRESS TEST INTERPRETATION    19    RE:  Kiara Landa   : 1995   AGE: 20 y.o. GESTATIONAL AGE:  30w2d    DIAGNOSIS:   Previous  delivery. Short cervix. Previous  delivery. INDICATION:  Previous  delivery.     TIME ON:  11:58 AM      TIME OFF:  12:20 PM      RESULT:   REACTIVE      FHR Baseline Rate:   140 bpm    PERIODIC CHANGES:    · Accelerations present, variability moderate, no decelerations noted    COMMENTS:      She is to continue having NST's every 3-4 days, and BPP with umbilical artery doppler studies once per week        Elena Major MD

## 2019-04-11 NOTE — PROGRESS NOTES
NST completed. Baseline 140 with moderate variability+ accelelrations. Occasional ctx noted.  Reactive per dr Amarjit Willard

## 2019-04-11 NOTE — TELEPHONE ENCOUNTER
Carly jin @ [facility] message to call regarding deneja progesterone issues. States will take shot from.  States nurse cancelled appts

## 2019-04-11 NOTE — PROGRESS NOTES
NON STRESS TEST INTERPRETATION    19    RE:  Anni Gómez   : 1995   AGE: 20 y.o. GESTATIONAL AGE:  30w2d    DIAGNOSIS:   Previous  delivery. Short cervix. Previous  delivery. INDICATION:  Previous  delivery. TIME ON:  11:58 AM      TIME OFF:  12:20 PM      RESULT:   REACTIVE      FHR Baseline Rate:   140 bpm    PERIODIC CHANGES:    · Accelerations present, variability moderate, no decelerations noted    COMMENTS:      She is to continue having NST's every 3-4 days, and BPP with umbilical artery doppler studies once per week        Asia Cummings MD                  19     RE:  Anni Gómez   : 1995   AGE: 20 y.o. REFERRING PHYSICIAN:    Marilu Holt MD    Dear   Mrs. Anni Gómez a 20 y.o.  [id number]  is seen today on follow up in our office. REASON FOR APPOINTMENT:  · Follow up on a pregnant patient with previous   delivery at 23 weeks       Prior to Admission medications    Medication Sig Start Date End Date Taking? Authorizing Provider   PROGESTERONE IM Inject 275 mg into the skin once a week   Yes Historical Provider, MD   folic acid (FOLVITE) 1 MG tablet take 1 tablet by mouth three times a day 10/31/18  Yes Historical Provider, MD   aspirin 81 MG tablet Take 81 mg by mouth daily   Yes Historical Provider, MD   Prenatal MV-Min-Fe Fum-FA-DHA (PRENATAL 1 PO) Take by mouth   Yes Historical Provider, MD     INTERVAL HISTORY:  Mrs Anni Gómez had an uneventful course of pregnancy since her last visit to our office. When seen today in our office she had no complaints. PHYSICAL EXAMINATION:  General Appearance:  Healthy looking, alert, no acute distress. Eyes:     No pallor, no icterus, no photophobia. Ears:     No ear drainage. Nose:     No nasal drainage, no paranasal sinus tenderness. Throat:   Mucosa moist, no oral thrush, no exudate. Neck:     No nuchal rigidity.   Back:     No CVA with you in your office for ongoing obstetric care. 9. She is to continue to be monitored with nonstress test and biophysical profiles as recommended at Dr. Kevin Salinas. Thank you again, doctor, for allowing us to be of service to your patient. If I can be of further assistance, please do not hesitate to call. Sincerely,        Glenny Rockwell M.D    Current encounter billing:  [place] OFFICE OUTPATIENT VISIT 15 MINUTES [[id number]]  US OB Follow Up Transabdominal Approach [PSM972 Custom]  Biophysical profile [OBO12 Custom]    **This report has been created using voice recognition software.  It may contain minor errors     which are inherent in voice recognition technology**

## 2019-04-11 NOTE — PATIENT INSTRUCTIONS
Call your primary obstetrician with bleeding, leaking of fluid, abdominal tenderness, headache, blurry vision, epigastric pain and increased urinary frequency. Any questions contact Romaine Mccarthy at [phone]. Do kick counts after dinner. Call your primary obstetrician if less than 10 kicks in 2 hours after dinner. Call your primary obstetrician with bleeding, leaking of fluid, abdominal tenderness, headache, blurry vision, epigastric pain and increased urinary frequency. if you are sick, not feeling well or have an infectious process going on please reschedule your appointment by calling [phone]. Also if any family members are not feeling well, please do not bring them to your appointment. We appreciate your cooperation. We are doing this in order to protect our pregnant mothers+ their babies. Patient Education        Weeks 30 to 28 of Your Pregnancy: Care Instructions  Your Care Instructions    You have made it to the final months of your pregnancy. By now, your baby is really starting to look like a baby, with hair and plump skin. As you enter the final weeks of pregnancy, the reality of having a baby may start to set in. This is the time to settle on a name, get your household in order, set up a safe nursery, and find quality  if needed. Doing these things in advance will allow you to focus on caring for and enjoying your new baby. You may also want to have a tour of your hospital's labor and delivery unit to get a better idea of what to expect while you are in the hospital.  During these last months, it is very important to take good care of yourself and pay attention to what your body needs. If your doctor says it is okay for you to work, don't push yourself too hard. Use the tips provided in this care sheet to ease heartburn and care for varicose veins. If you haven't already had the Tdap shot during this pregnancy, talk to your doctor about getting it.  It will help protect your  against hose.  · Exercise regularly. Try walking for at least 30 minutes a day. Where can you learn more? Go to https://chpepiceweb.healthzEconomy. org and sign in to your [employer] account. Enter W059 in the [facility] box to learn more about \"Weeks 30 to 32 of Your Pregnancy: Care Instructions. \"     If you do not have an account, please click on the \"Sign Up Now\" link. Current as of: September 5, 2018  Content Version: 11.9  © 9657-2571 [employer]. Care instructions adapted under license by [employer]. If you have questions about a medical condition or this instruction, always ask your healthcare professional. [place] [address] any warranty or liability for your use of this information. Patient Education        Learning About When to Call Your Doctor During Pregnancy (After 20 Weeks)  Your Care Instructions  It's common to have concerns about what might be a problem during pregnancy. Although most pregnant women don't have any serious problems, it's important to know when to call your doctor if you have certain symptoms or signs of labor. These are general suggestions. Your doctor may give you some more information about when to call. When to call your doctor (after 20 weeks)  Call 911 anytime you think you may need emergency care. For example, call if:  · You have severe vaginal bleeding. · You have sudden, severe pain in your belly. · You passed out (lost consciousness). · You have a seizure. · You see or feel the umbilical cord. · You think you are about to deliver your baby and can't make it safely to the hospital.  Call your doctor now or seek immediate medical care if:  · You have vaginal bleeding. · You have belly pain. · You have a fever. · You have symptoms of preeclampsia, such as:  ? Sudden swelling of your face, hands, or feet. ? New vision problems (such as dimness or blurring). ? A severe headache.   · You have a sudden release of fluid from your vagina. (You think your water broke.)  · You think that you may be in labor. This means that you've had at least 4 contractions within 20 minutes or at least 8 contractions in an hour. · You notice that your baby has stopped moving or is moving much less than normal.  · You have symptoms of a urinary tract infection. These may include:  ? Pain or burning when you urinate. ? A frequent need to urinate without being able to pass much urine. ? Pain in the flank, which is just below the rib cage and above the waist on either side of the back. ? Blood in your urine. Watch closely for changes in your health, and be sure to contact your doctor if:  · You have vaginal discharge that smells bad. · You have skin changes, such as:  ? A rash. ? Itching. ? Yellow color to your skin. · You have other concerns about your pregnancy. If you have labor signs at 37 weeks or more  If you have signs of labor at 37 weeks or more, your doctor may tell you to call when your labor becomes more active. Symptoms of active labor include:  · Contractions that are regular. · Contractions that are less than 5 minutes apart. · Contractions that are hard to talk through. Follow-up care is a key part of your treatment and safety. Be sure to make and go to all appointments, and call your doctor if you are having problems. It's also a good idea to know your test results and keep a list of the medicines you take. Where can you learn more? Go to https://Software 2000juhi.[email or web address] org and sign in to your [employer]htree account. Enter  in the [facility] box to learn more about \"Learning About When to Call Your Doctor During Pregnancy (After 20 Weeks). \"     If you do not have an account, please click on the \"Sign Up Now\" link. Current as of: September 5, 2018  Content Version: 11.9  © 5487-3786 [employer]. Care instructions adapted under license by [facility] [place] ([facility]).  If you have questions about a medical condition to [email or web address] and sign in to your Posterbeet account. Enter E862 in the [employer] box to learn more about \"Counting Your Baby's Kicks: Care Instructions. \"     If you do not have an account, please click on the \"Sign Up Now\" link. Current as of: September 5, 2018  Content Version: 11.9  © 7042-6268 [employer]orporated. Care instructions adapted under license by [facility] ([facility]). If you have questions about a medical condition or this instruction, always ask your healthcare professional. [place] [address] any warranty or liability for your use of this information.

## 2019-04-17 ENCOUNTER — HOSPITAL ENCOUNTER (OUTPATIENT)
Age: 24
Setting detail: OBSERVATION
Discharge: HOME | End: 2019-04-18
Attending: FAMILY MEDICINE | Admitting: FAMILY MEDICINE
Payer: COMMERCIAL

## 2019-04-17 ENCOUNTER — ROUTINE PRENATAL (OUTPATIENT)
Dept: OBGYN CLINIC | Age: 24
End: 2019-04-17
Payer: COMMERCIAL

## 2019-04-17 VITALS
SYSTOLIC BLOOD PRESSURE: 112 MMHG | BODY MASS INDEX: 23.52 KG/M2 | HEIGHT: 62 IN | HEART RATE: 96 BPM | DIASTOLIC BLOOD PRESSURE: 70 MMHG | WEIGHT: 127.8 LBS

## 2019-04-17 DIAGNOSIS — O34.219 PREVIOUS CESAREAN SECTION COMPLICATING PREGNANCY, ANTEPARTUM CONDITION OR COMPLICATION: ICD-10-CM

## 2019-04-17 DIAGNOSIS — O36.5930 POOR FETAL GROWTH AFFECTING MANAGEMENT OF MOTHER IN THIRD TRIMESTER, SINGLE OR UNSPECIFIED FETUS: Primary | ICD-10-CM

## 2019-04-17 DIAGNOSIS — Z3A.31 31 WEEKS GESTATION OF PREGNANCY: ICD-10-CM

## 2019-04-17 DIAGNOSIS — O47.03 PREMATURE UTERINE CONTRACTIONS CAUSING THREATENED PREMATURE LABOR IN THIRD TRIMESTER: ICD-10-CM

## 2019-04-17 DIAGNOSIS — O34.32 CERVICAL FUNNELING AFFECTING PREGNANCY IN SECOND TRIMESTER: ICD-10-CM

## 2019-04-17 DIAGNOSIS — O26.872 SHORT CERVIX DURING PREGNANCY IN SECOND TRIMESTER: ICD-10-CM

## 2019-04-17 DIAGNOSIS — O09.299 NEONATAL DEATH IN PRIOR PREGNANCY, CURRENTLY PREGNANT: ICD-10-CM

## 2019-04-17 LAB
GLUCOSE URINE, POC: NORMAL
PROTEIN UA: ABNORMAL

## 2019-04-17 PROCEDURE — 76817 TRANSVAGINAL US OBSTETRIC: CPT | Performed by: OBSTETRICS & GYNECOLOGY

## 2019-04-17 PROCEDURE — 2580000003 HC RX 258: Performed by: OBSTETRICS & GYNECOLOGY

## 2019-04-17 PROCEDURE — 76805 OB US >/= 14 WKS SNGL FETUS: CPT | Performed by: OBSTETRICS & GYNECOLOGY

## 2019-04-17 PROCEDURE — 96366 THER/PROPH/DIAG IV INF ADDON: CPT

## 2019-04-17 PROCEDURE — 99214 OFFICE O/P EST MOD 30 MIN: CPT | Performed by: OBSTETRICS & GYNECOLOGY

## 2019-04-17 PROCEDURE — 1036F TOBACCO NON-USER: CPT | Performed by: OBSTETRICS & GYNECOLOGY

## 2019-04-17 PROCEDURE — 51702 INSERT TEMP BLADDER CATH: CPT

## 2019-04-17 PROCEDURE — 81002 URINALYSIS NONAUTO W/O SCOPE: CPT | Performed by: OBSTETRICS & GYNECOLOGY

## 2019-04-17 PROCEDURE — 96372 THER/PROPH/DIAG INJ SC/IM: CPT

## 2019-04-17 PROCEDURE — 76820 UMBILICAL ARTERY ECHO: CPT | Performed by: OBSTETRICS & GYNECOLOGY

## 2019-04-17 PROCEDURE — 99211 OFF/OP EST MAY X REQ PHY/QHP: CPT | Performed by: OBSTETRICS & GYNECOLOGY

## 2019-04-17 PROCEDURE — 96365 THER/PROPH/DIAG IV INF INIT: CPT

## 2019-04-17 PROCEDURE — G8427 DOCREV CUR MEDS BY ELIG CLIN: HCPCS | Performed by: OBSTETRICS & GYNECOLOGY

## 2019-04-17 PROCEDURE — 6360000002 HC RX W HCPCS: Performed by: OBSTETRICS & GYNECOLOGY

## 2019-04-17 PROCEDURE — G8420 CALC BMI NORM PARAMETERS: HCPCS | Performed by: OBSTETRICS & GYNECOLOGY

## 2019-04-17 PROCEDURE — 6370000000 HC RX 637 (ALT 250 FOR IP): Performed by: OBSTETRICS & GYNECOLOGY

## 2019-04-17 PROCEDURE — 76818 FETAL BIOPHYS PROFILE W/NST: CPT | Performed by: OBSTETRICS & GYNECOLOGY

## 2019-04-17 RX ORDER — SODIUM CHLORIDE, SODIUM LACTATE, POTASSIUM CHLORIDE, CALCIUM CHLORIDE 600; 310; 30; 20 MG/100ML; MG/100ML; MG/100ML; MG/100ML
INJECTION, SOLUTION INTRAVENOUS CONTINUOUS
Status: DISCONTINUED | OUTPATIENT
Start: 2019-04-17 | End: 2019-04-18 | Stop reason: HOSPADM

## 2019-04-17 RX ORDER — INDOMETHACIN 25 MG/1
100 CAPSULE ORAL ONCE
Status: COMPLETED | OUTPATIENT
Start: 2019-04-17 | End: 2019-04-17

## 2019-04-17 RX ORDER — BETAMETHASONE SODIUM PHOSPHATE AND BETAMETHASONE ACETATE 3; 3 MG/ML; MG/ML
12 INJECTION, SUSPENSION INTRA-ARTICULAR; INTRALESIONAL; INTRAMUSCULAR; SOFT TISSUE ONCE
Status: COMPLETED | OUTPATIENT
Start: 2019-04-17 | End: 2019-04-17

## 2019-04-17 RX ORDER — BETAMETHASONE SODIUM PHOSPHATE AND BETAMETHASONE ACETATE 3; 3 MG/ML; MG/ML
12 INJECTION, SUSPENSION INTRA-ARTICULAR; INTRALESIONAL; INTRAMUSCULAR; SOFT TISSUE ONCE
Status: COMPLETED | OUTPATIENT
Start: 2019-04-18 | End: 2019-04-18

## 2019-04-17 RX ORDER — MAGNESIUM SULFATE IN WATER 40 MG/ML
4 INJECTION, SOLUTION INTRAVENOUS ONCE
Status: COMPLETED | OUTPATIENT
Start: 2019-04-17 | End: 2019-04-17

## 2019-04-17 RX ORDER — INDOMETHACIN 25 MG/1
50 CAPSULE ORAL EVERY 6 HOURS
Status: DISCONTINUED | OUTPATIENT
Start: 2019-04-18 | End: 2019-04-18 | Stop reason: HOSPADM

## 2019-04-17 RX ADMIN — SODIUM CHLORIDE, POTASSIUM CHLORIDE, SODIUM LACTATE AND CALCIUM CHLORIDE: 600; 310; 30; 20 INJECTION, SOLUTION INTRAVENOUS at 19:00

## 2019-04-17 RX ADMIN — BETAMETHASONE SODIUM PHOSPHATE AND BETAMETHASONE ACETATE 12 MG: 3; 3 INJECTION, SUSPENSION INTRA-ARTICULAR; INTRALESIONAL; INTRAMUSCULAR at 19:58

## 2019-04-17 RX ADMIN — MAGNESIUM SULFATE HEPTAHYDRATE 4 G: 40 INJECTION, SOLUTION INTRAVENOUS at 21:37

## 2019-04-17 RX ADMIN — MAGNESIUM SULFATE HEPTAHYDRATE 2 G/HR: 40 INJECTION, SOLUTION INTRAVENOUS at 22:09

## 2019-04-17 RX ADMIN — INDOMETHACIN 100 MG: 25 CAPSULE ORAL at 19:56

## 2019-04-17 NOTE — PATIENT INSTRUCTIONS
You might be having an NST at your next appt. Please eat a large snack or breakfast before coming to office. Thank youCall your primary obstetrician with bleeding, leaking of fluid, abdominal tenderness, headache, blurry vision, epigastric pain and increased urinary frequency. Any questions contact [facility] at [phone]. If you are experiencing an emergency and need immediate help, call 911 or go to go emergency room or labor and delivery. Do kick counts after dinner. Call your primary obstetrician if less than 10 kicks in 2 hours after dinner. Call your primary obstetrician with bleeding, leaking of fluid, abdominal tenderness, headache, blurry vision, epigastric pain and increased urinary frequency. if you are sick, not feeling well or have an infectious process going on please reschedule your appointment by calling [phone]. Also if any family members are not feeling well, please do not bring them to your appointment. We appreciate your cooperation. We are doing this in order to protect our pregnant mothers+ their babies. Patient Education        Weeks 30 to 28 of Your Pregnancy: Care Instructions  Your Care Instructions    You have made it to the final months of your pregnancy. By now, your baby is really starting to look like a baby, with hair and plump skin. As you enter the final weeks of pregnancy, the reality of having a baby may start to set in. This is the time to settle on a name, get your household in order, set up a safe nursery, and find quality  if needed. Doing these things in advance will allow you to focus on caring for and enjoying your new baby. You may also want to have a tour of your hospital's labor and delivery unit to get a better idea of what to expect while you are in the hospital.  During these last months, it is very important to take good care of yourself and pay attention to what your body needs.  If your doctor says it is okay for you to work, don't push yourself too hard. Use the tips provided in this care sheet to ease heartburn and care for varicose veins. If you haven't already had the Tdap shot during this pregnancy, talk to your doctor about getting it. It will help protect your  against pertussis infection. Follow-up care is a key part of your treatment and safety. Be sure to make and go to all appointments, and call your doctor if you are having problems. It's also a good idea to know your test results and keep a list of the medicines you take. How can you care for yourself at home? Pay attention to your baby's movements  · You should feel your baby move several times every day. · Your baby now turns less, and kicks and jabs more. · Your baby sleeps 20 to 45 minutes at a time and is more active at certain times of day. · If your doctor wants you to count your baby's kicks:  ? Empty your bladder, and lie on your side or relax in a comfortable chair. ? Write down your start time. ? Pay attention only to your baby's movements. Count any movement except hiccups. ? After you have counted 10 movements, write down your stop time. ? Write down how many minutes it took for your baby to move 10 times. ? If an hour goes by and you have not recorded 10 movements, have something to eat or drink and then count for another hour. If you do not record 10 movements in either hour, call your doctor. Ease heartburn  · Eat small, frequent meals. · Do not eat chocolate, peppermint, or very spicy foods. Avoid drinks with caffeine, such as coffee, tea, and sodas. · Avoid bending over or lying down after meals. · Talk a short walk after you eat. · If heartburn is a problem at night, do not eat for 2 hours before bedtime. · Take antacids like Mylanta, Maalox, Rolaids, or Tums. Do not take antacids that have sodium bicarbonate. Care for varicose veins  · Varicose veins are blood vessels that stretch out with the extra blood during pregnancy. Your legs may ache or throb. pain.  · You have a fever. · You have symptoms of preeclampsia, such as:  ? Sudden swelling of your face, hands, or feet. ? New vision problems (such as dimness or blurring). ? A severe headache. · You have a sudden release of fluid from your vagina. (You think your water broke.)  · You think that you may be in labor. This means that you've had at least 4 contractions within 20 minutes or at least 8 contractions in an hour. · You notice that your baby has stopped moving or is moving much less than normal.  · You have symptoms of a urinary tract infection. These may include:  ? Pain or burning when you urinate. ? A frequent need to urinate without being able to pass much urine. ? Pain in the flank, which is just below the rib cage and above the waist on either side of the back. ? Blood in your urine. Watch closely for changes in your health, and be sure to contact your doctor if:  · You have vaginal discharge that smells bad. · You have skin changes, such as:  ? A rash. ? Itching. ? Yellow color to your skin. · You have other concerns about your pregnancy. If you have labor signs at 37 weeks or more  If you have signs of labor at 37 weeks or more, your doctor may tell you to call when your labor becomes more active. Symptoms of active labor include:  · Contractions that are regular. · Contractions that are less than 5 minutes apart. · Contractions that are hard to talk through. Follow-up care is a key part of your treatment and safety. Be sure to make and go to all appointments, and call your doctor if you are having problems. It's also a good idea to know your test results and keep a list of the medicines you take. Where can you learn more? Go to https://tavia.healthMediaInterface [place]. org and sign in to your Backblaze account. Enter  in the [employer] box to learn more about \"Learning About When to Call Your Doctor During Pregnancy (After 20 Weeks). \"     If you do not have an account, please click on the \"Sign Up Now\" link. Current as of: September 5, 2018  Content Version: 11.9  © 2882-8961 [employer]. Care instructions adapted under license by [facility] [facility] ([facility]). If you have questions about a medical condition or this instruction, always ask your healthcare professional. [address] any warranty or liability for your use of this information. Patient Education        Counting Your Baby's Kicks: Care Instructions  Your Care Instructions    Counting your baby's kicks is one way your doctor can tell that your baby is healthy. Most women--especially in a first pregnancy--feel their baby move for the first time between 16 and 22 weeks. The movement may feel like flutters rather than kicks. Your baby may move more at certain times of the day. When you are active, you may notice less kicking than when you are resting. At your prenatal visits, your doctor will ask whether the baby is active. In your last trimester, your doctor may ask you to count the number of times you feel your baby move. Follow-up care is a key part of your treatment and safety. Be sure to make and go to all appointments, and call your doctor if you are having problems. It's also a good idea to know your test results and keep a list of the medicines you take. How do you count fetal kicks? · A common method of checking your baby's movement is to count the number of kicks or moves you feel in 1 hour. Ten movements (such as kicks, flutters, or rolls) in 1 hour are normal. Some doctors suggest that you count in the morning until you get to 10 movements. Then you can quit for that day and start again the next day. · Pick your baby's most active time of day to count. This may be any time from morning to evening. · If you do not feel 10 movements in an hour, your baby may be sleeping. Wait for the next hour and count again. When should you call for help?   Call your doctor now or seek immediate medical care if:    · You noticed that your baby has stopped moving or is moving much less than normal.    Watch closely for changes in your health, and be sure to contact your doctor if you have any problems. Where can you learn more? Go to https://chpemontserrateb.Front [email or web address] org and sign in to your [employer] account. Enter E472 in the [employer] box to learn more about \"Counting Your Baby's Kicks: Care Instructions. \"     If you do not have an account, please click on the \"Sign Up Now\" link. Current as of: September 5, 2018  Content Version: 11.9  © 2995-0938 Andela, [employer]orporated. Care instructions adapted under license by [facility] ([facility]). If you have questions about a medical condition or this instruction, always ask your healthcare professional. [address] any warranty or liability for your use of this information.

## 2019-04-17 NOTE — LETTER
19    Dusty Yung MD  [address] [facility], [address]     RE: Carolynn Shaffer  : 1995   AGE: 20 y. o.     This report has been created using voice recognition software. It may contain errors which are inherent in voice recognition technology.     Dear Dr. Beth Alejo:     I saw your patient Kingsley Mujica for the following indications:     Patient Active Problem List   Diagnosis    MDD (major depressive disorder)    H/O  delivery, currently pregnant    Previous  section complicating pregnancy, antepartum condition or complication     death in prior pregnancy, currently pregnant    Cervical funneling affecting pregnancy in second trimester    Short cervix during pregnancy in second trimester    Encounter for fetal anatomic survey      As you know, your patient is a 20 y. o. female, who is G5(2,1,1,2). She has an Estimated Date of Delivery: 19 based on her previous ultrasound assessment. Rosa Bardales is currently 31 weeks 1 days gestation based on that assessment.      The patient has had no major problems since her last visit. She states that her fetal movement has been good. She has had no increased vaginal discharge, vaginal bleeding, back pain, dysuria, hematuria, or leaking of amniotic fluid.     The patient delivered at 23 weeks 6 days gestational age with her pregnancy in .  She had a  section delivery secondary to a placental abruption.  She received 17P injections with her next pregnancy and delivered at term.       She is to continue to receive weekly injections of 17-P until 36 weeks gestation, unless she has a clinical indication to discontinue the medication prior to that time.      The patient had a vaginal birth after  section.  She plans to have a vaginal birth with her current pregnancy.     The NST today was reactive with moderate variability and accelerations.  An occasional variable was noted. She was having asymptomatic uterine contractions. The contractions were irregular. Cervical change was documented on the ultrasound assessment.        A fetal ultrasound assessment was performed.  A living garibay intrauterine fetus was present in the cephalic presentation with normal fetal heart motion and normal fetal motion noted.  The estimated fetal weight was at the 23rd growth percentile.  The biometric measurements were consistent with poor fetal growth. The biparietal diameter was at the 6 growth percentile. The head circumference and abdominal circumference were both less than the fifth growth percentile.   The biophysical profile and cord Doppler studies were both reassuring.  There was no absence or reversal of end-diastolic flow.                                GENETIC SCREENING/TERATOLOGY COUNSELING                            (Includes patient, FTB, and any affected family members)     Patient Age > 35 Years NO   Thalassemia ( MVC<80) NO   Congential Heart Defect NO   Neural Tube Defect NO   Rich-Sachs NO   Sickle Cell Disease NO   Sickle Cell Trait NO   Sickle C Disease or Trait NO   Hemophilia NO   Muscular Dystrophy NO   Cystic Fibrosis NO   Darryn Disease NO   Autism NO   Mental Retardation NO   History of Fragile X NO   Maternal Diabetes NO   Other Genetic Disease or Syndrome NO   Previous Child With Congenital Abnormality Not Listed NO   Recreational Drugs NO                                                   INFECTION HISTORY          HEPATITIS IMMUNIZED:  YES   HEPATITIS INFECTION:  NO   EXPOSURE TO TB NO   GENITAL HERPES    NO   PARVOVIRUS B-19 NO   CHICKEN POX  NO   MEASLES NO   STD NO   HIV NO   OTHER RASH OR VIRAL ILLNESS SINCE LMP NO   UTI RECURRENT NO   HPV NO         OB History    Para Term  AB Living   5 3 2 1 1 2   SAB TAB Ectopic Molar Multiple Live Births   1         3     # Outcome Date GA Lbr Oziel/2nd Weight Sex Delivery Anes PTL Lv   5 Current                     4 2018                   3 Term 17 37w0d   5 lb 11 oz (2.58 kg) M  EPI N JESSICA   2  13 23w6d 02:55 1 lb 10.1 oz (0.74 kg) M CS-LTranv   Y ND      Birth Comments: 3 wk nicu, passed after 3 wks   1 Term 01/31/10 37w0d   5 lb 1 oz (2.296 kg) M Vag-Spont None N JESSICA      PAST GYNECOLOGICAL  HISTORY:  Negative for abnormal pap smears. Negative for sexually transmitted diseases. Negative for cervical LEEP / conization /cryosurgery.    Positive for uterine surgery.    Negative for ovarian or tubal surgery.      Past Medical History:   Diagnosis Date    MDD (major depressive disorder) 2015    STD (female)           Past Surgical History:   Procedure Laterality Date    ABDOMEN SURGERY         C SECTION      SECTION            Allergies   Allergen Reactions    Pcn [Penicillins] Hives         Current Outpatient Prescriptions:     folic acid (FOLVITE) 1 MG tablet, take 1 tablet by mouth three times a day, Disp: , Rfl: 0    aspirin 81 MG tablet, Take 81 mg by mouth daily, Disp: , Rfl:     Prenatal MV-Min-Fe Fum-FA-DHA (PRENATAL 1 PO), Take by mouth, Disp: , Rfl:      Social History   Substance Use Topics    Smoking status: Former Smoker       Packs/day: 1.00       Years: 8.00       Types: Cigars    Smokeless tobacco: Never Used    Alcohol use No         Comment: social         FAMILY MEDICAL HISTORY:   Negative for congenital abnormalities, autism, genetic disease and mental retardation, not listed above.      Review of Systems :   CONSTITUTIONAL : No fever, no chills   HEENT : No headache, no visual changes, no rhinorrhea, no sore throat   CARDIOVASCULAR : No pain, no palpitations, no edema   RESPIRATORY : No pain, no shortness of breath   GASTROINTESTINAL : No N/V, no D/C, no abdominal pain   GENITOURINARY : No dysuria, hematuria and no incontinence MUSCULOSKELETAL : No myalgia, No back pain  NEUROLOGICAL : No numbness, no tingling, no tremors. No history of seizures  ALL OTHER SYSTEMS WERE REPORTED AS NEGATIVE.     PERTINENT PHYSICAL EXAMINATION:   /70   Pulse 96   Ht 5' 2\" (1.575 m)   Wt 127 lb 12.8 oz (58 kg)   LMP 2018   BMI 23.37 kg/m²   Urine dipstick:   Negative for Glucose    1+ for Albumin     GENERAL:   The patient is a well developed, female who is alert cooperative and oriented times three in no acute distress.     HEENT:  Normo cephalic and atraumatic. No facial edema.      ABDOMEN:   Her uterus is gravid. She had no complaint of abdominal pain or tenderness. The fetal heart rate is 145 bpm.      EXTREMITIES:  No peripheral edema is noted.      PELVIC EXAMINATION:  Transvaginal ultrasound assessment of the cervix was performed. The cervical length was 6 mm, with funneling of the amniotic membranes.      A fetal ultrasound assessment was performed today. A report is enclosed for your review.     IMPRESSION:    1.  IUP at 31 weeks 1 days gestation based on her Estimated Date of Delivery: 19    2.  Previous  delivery at 23 weeks with placenta abruption     3.  Term delivery prior to and after #2. 4.  Had 17-P with her most recent pregnancy    5.  Short cervix with funneling of the amniotic membranes. 6.  Previous  with 23 week delivery    7.  History of depression     8.  Prior  demise from extreme prematurity     9.   with her last pregnancy   10. Reactive NST with occasional variables  11. Reassuring BPP and cord Doppler testing  12. Uterine contractions  13. Poor fetal growth as noted above      PLAN:  As we discussed at the time of the patient's assessment, I recommended the patient go to labor and delivery for further evaluation and management secondary to  labor with a short cervix and funneling of the amniotic membranes. DVT prophylaxis should be utilized throughout her admission. Continuous fetal heart rate and uterine contraction monitoring should be utilized for now. I recommended using Indocin for uterine tocolysis, with a 100 mg initial dose followed by 50 mg every 6 hours for 7 more doses. Celestone should be administered for acceleration of fetal organ maturity. Magnesium sulfate should be administered for fetal neural protection. I recommend obtaining a urinalysis with a culture and sensitivity and urine drug screen. Further evaluation and management will be dependent on the results the patient's testing her clinical presentation.     I spent 25 minutes of direct contact time with the patient of which greater than 50% of the time was to discuss complications and problems related to her pregnancy. Created our telephone conversation regarding my recommendations for management and review of the findings today. I answered all of her questions to her satisfaction.  I asked her to call if she had any additional questions prior to her next visit.       If you have any questions regarding her management, please contact me at your convenience and thank you for allowing me to participate in her care.     Sincerely,           Verenice Jones MD, [address], Ekaterina Griffin, [address] [address] Beena Agarwal T  Director [address] [place] [address]  [phone]

## 2019-04-17 NOTE — PROGRESS NOTES
Patient presents to labor and delivery from Dr Colin Ramirez office.  @31.1 , patient has a short cervix and contractions started overnight.  Denies MARCO A, TIMA. +FM

## 2019-04-18 VITALS
RESPIRATION RATE: 16 BRPM | HEART RATE: 82 BPM | SYSTOLIC BLOOD PRESSURE: 89 MMHG | TEMPERATURE: 98.1 F | DIASTOLIC BLOOD PRESSURE: 53 MMHG | OXYGEN SATURATION: 98 %

## 2019-04-18 PROBLEM — Z64.1 MULTIPARITY: Status: ACTIVE | Noted: 2019-04-18

## 2019-04-18 PROCEDURE — 6360000002 HC RX W HCPCS: Performed by: OBSTETRICS & GYNECOLOGY

## 2019-04-18 PROCEDURE — 96366 THER/PROPH/DIAG IV INF ADDON: CPT

## 2019-04-18 PROCEDURE — 76817 TRANSVAGINAL US OBSTETRIC: CPT | Performed by: OBSTETRICS & GYNECOLOGY

## 2019-04-18 PROCEDURE — 76820 UMBILICAL ARTERY ECHO: CPT

## 2019-04-18 PROCEDURE — 6370000000 HC RX 637 (ALT 250 FOR IP): Performed by: OBSTETRICS & GYNECOLOGY

## 2019-04-18 PROCEDURE — 76820 UMBILICAL ARTERY ECHO: CPT | Performed by: OBSTETRICS & GYNECOLOGY

## 2019-04-18 PROCEDURE — 76817 TRANSVAGINAL US OBSTETRIC: CPT

## 2019-04-18 PROCEDURE — 99243 OFF/OP CNSLTJ NEW/EST LOW 30: CPT | Performed by: OBSTETRICS & GYNECOLOGY

## 2019-04-18 PROCEDURE — 96361 HYDRATE IV INFUSION ADD-ON: CPT

## 2019-04-18 PROCEDURE — 2580000003 HC RX 258: Performed by: OBSTETRICS & GYNECOLOGY

## 2019-04-18 PROCEDURE — 96372 THER/PROPH/DIAG INJ SC/IM: CPT

## 2019-04-18 PROCEDURE — 76819 FETAL BIOPHYS PROFIL W/O NST: CPT

## 2019-04-18 PROCEDURE — G0378 HOSPITAL OBSERVATION PER HR: HCPCS

## 2019-04-18 PROCEDURE — 76819 FETAL BIOPHYS PROFIL W/O NST: CPT | Performed by: OBSTETRICS & GYNECOLOGY

## 2019-04-18 RX ORDER — ONDANSETRON 2 MG/ML
4 INJECTION INTRAMUSCULAR; INTRAVENOUS EVERY 6 HOURS PRN
Status: DISCONTINUED | OUTPATIENT
Start: 2019-04-18 | End: 2019-04-18 | Stop reason: HOSPADM

## 2019-04-18 RX ORDER — INDOMETHACIN 25 MG/1
CAPSULE ORAL
Status: DISPENSED
Start: 2019-04-18 | End: 2019-04-18

## 2019-04-18 RX ORDER — ACETAMINOPHEN 325 MG/1
650 TABLET ORAL EVERY 4 HOURS PRN
Status: DISCONTINUED | OUTPATIENT
Start: 2019-04-18 | End: 2019-04-18 | Stop reason: HOSPADM

## 2019-04-18 RX ADMIN — SODIUM CHLORIDE, POTASSIUM CHLORIDE, SODIUM LACTATE AND CALCIUM CHLORIDE: 600; 310; 30; 20 INJECTION, SOLUTION INTRAVENOUS at 08:10

## 2019-04-18 RX ADMIN — BETAMETHASONE SODIUM PHOSPHATE AND BETAMETHASONE ACETATE 12 MG: 3; 3 INJECTION, SUSPENSION INTRA-ARTICULAR; INTRALESIONAL; INTRAMUSCULAR at 18:33

## 2019-04-18 RX ADMIN — INDOMETHACIN 50 MG: 25 CAPSULE ORAL at 02:08

## 2019-04-18 RX ADMIN — MAGNESIUM SULFATE HEPTAHYDRATE 2 G/HR: 40 INJECTION, SOLUTION INTRAVENOUS at 04:30

## 2019-04-18 RX ADMIN — INDOMETHACIN 50 MG: 25 CAPSULE ORAL at 08:10

## 2019-04-18 RX ADMIN — INDOMETHACIN 50 MG: 25 CAPSULE ORAL at 14:30

## 2019-04-18 ASSESSMENT — PAIN SCALES - GENERAL: PAINLEVEL_OUTOF10: 0

## 2019-04-18 NOTE — PROGRESS NOTES
Dr Holli Grover updated on patient progress, MFM consult and plan of care.  Orders to discharge obtained

## 2019-04-18 NOTE — CONSULTS
Daiana Bundy MD  [address] [facility], [address]     RE: Yasmine Christensen  : 1995   AGE: 20 y. o.     This report has been created using voice recognition software. It may contain errors which are inherent in voice recognition technology.     Dear Dr. Nitin Arita:     I saw your patient Peter Henning for the following indications:     Patient Active Problem List   Diagnosis    MDD (major depressive disorder)    H/O  delivery, currently pregnant    Previous  section complicating pregnancy, antepartum condition or complication     death in prior pregnancy, currently pregnant    Cervical funneling affecting pregnancy in second trimester    Short cervix during pregnancy in second trimester    Poor fetal growth       As you know, your patient is a 20 y. o. female, who is G5(2,1,1,2). She has an Estimated Date of Delivery: 19 based on her previous ultrasound assessment. Ishan Law is currently 31 weeks 2 days gestation based on that assessment.      The patient was admitted for evaluation and management secondary to poor fetal growth, with a short cervix and funneling of the amniotic membranes into the endocervical canal.     The patient had no complaints today. She stated that her fetal movement has been good. She's had no vaginal bleeding or leaking of amniotic fluid. The patient received Indocin for uterine tocolysis. The fetal heart rate tracing is been reassuring with moderate variability and accelerations. She had an occasional variable deceleration.     The patient delivered at 23 weeks 6 days gestational age with her pregnancy in .  She had a  section delivery secondary to a placental abruption.  She received 17-P injections with her next pregnancy and delivered at term.       She is to continue to receive weekly injections of 17-P until 36 weeks gestation, unless she has a clinical indication to discontinue the medication prior to that time.    The patient had a vaginal birth after  section.  She plans to have a vaginal birth with her current pregnancy.      A fetal ultrasound assessment was performed on 2019.  A living garibay intrauterine fetus was present in the cephalic presentation with normal fetal heart motion and normal fetal motion noted.  The estimated fetal weight was at the 23rd growth percentile.  The biometric measurements were consistent with poor fetal growth. The biparietal diameter was at the 6 growth percentile. The head circumference and abdominal circumference were both less than the fifth growth percentile. The biophysical profile and cord Doppler studies were both reassuring.  There was no absence or reversal of end-diastolic flow. The amniotic fluid index today was 13.2 cm. The fetus was in the cephalic presentation. The biophysical profile and cord Doppler studies were both reassuring.   Was no evidence of absence or reversal of end-diastolic flow.                                GENETIC SCREENING/TERATOLOGY COUNSELING                            (Includes patient, FTB, and any affected family members)     Patient Age > 35 Years NO   Thalassemia ( MVC<80) NO   Congential Heart Defect NO   Neural Tube Defect NO   Rich-Sachs NO   Sickle Cell Disease NO   Sickle Cell Trait NO   Sickle C Disease or Trait NO   Hemophilia NO   Muscular Dystrophy NO   Cystic Fibrosis NO   Darryn Disease NO   Autism NO   Mental Retardation NO   History of Fragile X NO   Maternal Diabetes NO   Other Genetic Disease or Syndrome NO   Previous Child With Congenital Abnormality Not Listed NO   Recreational Drugs NO                                                   INFECTION HISTORY          HEPATITIS IMMUNIZED:  YES   HEPATITIS INFECTION:  NO   EXPOSURE TO TB NO   GENITAL HERPES    NO   PARVOVIRUS B-19 NO   CHICKEN POX  NO   MEASLES NO   STD NO   HIV NO   OTHER RASH OR VIRAL ILLNESS SINCE LMP NO   UTI RECURRENT NO   HPV NO       OB History    Para Term  AB Living   5 3 2 1 1 2   SAB TAB Ectopic Molar Multiple Live Births   1         3       # Outcome Date GA Lbr Oziel/2nd Weight Sex Delivery Anes PTL Lv   5 Current                     4 SAB 2018                   3 Term 17 37w0d   5 lb 11 oz (2.58 kg) M  EPI N JESSICA   2  13 23w6d 02:55 1 lb 10.1 oz (0.74 kg) M CS-LTranv   Y ND      Birth Comments: 3 wk nicu, passed after 3 wks   1 Term 01/31/10 37w0d   5 lb 1 oz (2.296 kg) M Vag-Spont None N JESSICA      PAST GYNECOLOGICAL  HISTORY:  Negative for abnormal pap smears. Negative for sexually transmitted diseases. Negative for cervical LEEP / conization /cryosurgery.    Positive for uterine surgery.    Negative for ovarian or tubal surgery.      Past Medical History:   Diagnosis Date    MDD (major depressive disorder) 2015    STD (female)           Past Surgical History:   Procedure Laterality Date    ABDOMEN SURGERY         C SECTION      SECTION            Allergies   Allergen Reactions    Pcn [Penicillins] Hives         Current Facility-Administered Medications   Medication Dose Route Frequency Provider Last Rate Last Dose    acetaminophen (TYLENOL) tablet 650 mg  650 mg Oral Q4H PRN Huma Marion MD        ondansetron [facility]) injection 4 mg  4 mg Intravenous Q6H PRN Huma Marion MD        magnesium sulfate (20 G/500 mL) infusion  2 g/hr Intravenous Continuous Chris S [place], DO   Stopped at 19 1001    lactated ringers infusion   Intravenous Continuous Chris S [place], DO   Stopped at 19 1827    indomethacin (INDOCIN) capsule 50 mg  50 mg Oral Q6H Chris S [place], DO   50 mg at 19 1430     Facility-Administered Medications Ordered in Other Encounters   Medication Dose Route Frequency Provider Last Rate Last Dose    indomethacin (INDOCIN) 25 MG capsule                 Social History   Substance Use Topics    Smoking status: Former Smoker     -- -- -- 98 %   04/18/19 0500 -- -- -- -- -- 100 %   04/18/19 0439 (!) 97/58 -- -- 80 16 --   04/18/19 0430 -- -- -- -- -- 100 %   04/18/19 0415 -- -- -- -- -- 100 %   04/18/19 0400 -- -- -- -- -- (!) 87 %   04/18/19 0350 -- -- -- -- -- 100 %   04/18/19 0349 -- -- -- -- -- 100 %   04/18/19 0345 -- -- -- -- -- 100 %   04/18/19 0339 -- -- -- -- -- 100 %   04/18/19 0334 -- -- -- -- -- 100 %   04/18/19 0330 -- -- -- -- -- 100 %   04/18/19 0324 -- -- -- -- -- 100 %   04/18/19 0319 -- -- -- -- -- 100 %   04/18/19 0315 -- -- -- -- -- 100 %   04/18/19 0311 93/61 -- -- 75 16 --   04/18/19 0309 -- -- -- -- -- 100 %   04/18/19 0304 -- -- -- -- -- 100 %   04/18/19 0255 -- -- -- -- -- 99 %   04/18/19 0254 -- -- -- -- -- 99 %   04/18/19 0250 -- -- -- -- -- 99 %   04/18/19 0245 -- -- -- -- -- 100 %   04/18/19 0230 -- -- -- -- -- 100 %   04/18/19 0200 98/68 97.8 °F (36.6 °C) Oral 76 16 100 %   04/18/19 0145 -- -- -- -- -- 100 %   04/18/19 0130 -- -- -- -- -- 98 %   04/18/19 0115 -- -- -- -- -- 99 %   04/18/19 0100 -- -- -- -- -- 99 %   04/18/19 0045 -- -- -- -- -- 100 %   04/18/19 0030 -- -- -- -- -- 100 %   04/18/19 0015 -- -- -- -- -- 100 %   04/18/19 0009 96/66 97.8 °F (36.6 °C) Oral 81 16 --   04/18/19 0000 -- -- -- -- -- 100 %   04/17/19 2345 -- -- -- -- -- 100 %   04/17/19 2330 -- -- -- -- -- 100 %   04/17/19 2315 -- -- -- -- -- 100 %   04/17/19 2300 -- 97.4 °F (36.3 °C) Oral -- 16 100 %   04/17/19 2253 (!) 95/59 -- -- 77 -- --   04/17/19 2238 (!) 99/58 -- -- 77 -- --   04/17/19 2208 (!) 116/56 -- -- 83 -- --   04/17/19 2206 (!) 93/54 -- -- 84 16 --   04/17/19 2201 (!) 92/55 -- -- 93 -- 100 %   04/17/19 2159 (!) 93/54 -- -- 75 -- 100 %   04/17/19 2152 (!) 94/52 -- -- 82 16 --   04/17/19 2147 (!) 93/51 97.7 °F (36.5 °C) Oral 80 16 100 %   04/17/19 2055 101/69 -- -- 78 -- --   04/17/19 2024 97/69 -- -- 80 -- --     Urine dipstick:   Negative for Glucose    1+ for Albumin     GENERAL:   The patient is a well developed, female who is alert cooperative and oriented times three in no acute distress.     HEENT:  Normo cephalic and atraumatic. No facial edema.      ABDOMEN:   Her uterus is gravid. She had no complaint of abdominal pain or tenderness. The fetal heart rate is 145 bpm.      EXTREMITIES:  No peripheral edema is noted.      PELVIC EXAMINATION:  Transvaginal ultrasound assessment of the cervix was performed. The cervical length was 7 mm, with funneling of the amniotic membranes.      A fetal ultrasound assessment was performed today. A report is enclosed for your review.     IMPRESSION:    1.  IUP at 31 weeks 2 days gestation based on her Estimated Date of Delivery: 19    2.  Previous  delivery at 23 weeks with placenta abruption     3.  Term delivery prior to and after #2.    4.  Had 17-P with her most recent pregnancy    5.  Short cervix with funneling of the amniotic membranes. No significant change in cervical length today.    6.  Previous  with 23 week delivery    7.  History of depression     8.  Prior  demise from extreme prematurity     9.   with her last pregnancy   10.  Reassuring fetal heart rate tracing, with moderate variability and accelerations. Occasional variables were noted. 11.  Reassuring BPP and cord Doppler testing  12. Uterine contractions, resolved  13. Poor fetal growth  14. Celestone course was completed for acceleration of fetal organ maturity  15. Magnesium sulfate was administered for fetal neural protection     PLAN:  I would recommend that the patient count fetal movements and call if she notices any subjective decrease in fetal movements, particularly if there are less than 10 major movements in an hour. Non-stress testing should be performed every 3 to 4 days through the balance of the pregnancy. Serial ultrasounds to assess fetal anatomy and growth should be performed.  The patient is at increase risk for  morbidity and mortality secondary to her

## 2019-04-18 NOTE — PROGRESS NOTES
Dr. Mayelin Meléndez rounding, states patient may receive celestone early per patient request and can be d/c from his standpoint after progesterone and celestone injections. Will call dr. Alize Sandhu for d/c order.

## 2019-04-24 ENCOUNTER — ROUTINE PRENATAL (OUTPATIENT)
Dept: OBGYN CLINIC | Age: 24
End: 2019-04-24
Payer: COMMERCIAL

## 2019-04-24 VITALS
BODY MASS INDEX: 23.55 KG/M2 | HEIGHT: 62 IN | WEIGHT: 128 LBS | DIASTOLIC BLOOD PRESSURE: 74 MMHG | HEART RATE: 96 BPM | SYSTOLIC BLOOD PRESSURE: 126 MMHG

## 2019-04-24 DIAGNOSIS — O09.899 H/O PRETERM DELIVERY, CURRENTLY PREGNANT: Primary | ICD-10-CM

## 2019-04-24 DIAGNOSIS — O47.03 PREMATURE UTERINE CONTRACTIONS CAUSING THREATENED PREMATURE LABOR IN THIRD TRIMESTER: ICD-10-CM

## 2019-04-24 DIAGNOSIS — O09.299 NEONATAL DEATH IN PRIOR PREGNANCY, CURRENTLY PREGNANT: ICD-10-CM

## 2019-04-24 DIAGNOSIS — O34.32 CERVICAL FUNNELING AFFECTING PREGNANCY IN SECOND TRIMESTER: ICD-10-CM

## 2019-04-24 DIAGNOSIS — O34.219 PREVIOUS CESAREAN SECTION COMPLICATING PREGNANCY, ANTEPARTUM CONDITION OR COMPLICATION: ICD-10-CM

## 2019-04-24 DIAGNOSIS — Z3A.32 32 WEEKS GESTATION OF PREGNANCY: ICD-10-CM

## 2019-04-24 DIAGNOSIS — O36.5930 POOR FETAL GROWTH AFFECTING MANAGEMENT OF MOTHER IN THIRD TRIMESTER, SINGLE OR UNSPECIFIED FETUS: ICD-10-CM

## 2019-04-24 DIAGNOSIS — O26.872 SHORT CERVIX DURING PREGNANCY IN SECOND TRIMESTER: ICD-10-CM

## 2019-04-24 PROCEDURE — 76818 FETAL BIOPHYS PROFILE W/NST: CPT | Performed by: OBSTETRICS & GYNECOLOGY

## 2019-04-24 PROCEDURE — 1036F TOBACCO NON-USER: CPT | Performed by: OBSTETRICS & GYNECOLOGY

## 2019-04-24 PROCEDURE — 76816 OB US FOLLOW-UP PER FETUS: CPT | Performed by: OBSTETRICS & GYNECOLOGY

## 2019-04-24 PROCEDURE — 81002 URINALYSIS NONAUTO W/O SCOPE: CPT | Performed by: OBSTETRICS & GYNECOLOGY

## 2019-04-24 PROCEDURE — 99213 OFFICE O/P EST LOW 20 MIN: CPT | Performed by: OBSTETRICS & GYNECOLOGY

## 2019-04-24 PROCEDURE — G8427 DOCREV CUR MEDS BY ELIG CLIN: HCPCS | Performed by: OBSTETRICS & GYNECOLOGY

## 2019-04-24 PROCEDURE — G8420 CALC BMI NORM PARAMETERS: HCPCS | Performed by: OBSTETRICS & GYNECOLOGY

## 2019-04-24 PROCEDURE — 76820 UMBILICAL ARTERY ECHO: CPT | Performed by: OBSTETRICS & GYNECOLOGY

## 2019-04-24 PROCEDURE — 76817 TRANSVAGINAL US OBSTETRIC: CPT | Performed by: OBSTETRICS & GYNECOLOGY

## 2019-04-24 PROCEDURE — 96372 THER/PROPH/DIAG INJ SC/IM: CPT | Performed by: OBSTETRICS & GYNECOLOGY

## 2019-04-24 PROCEDURE — 99211 OFF/OP EST MAY X REQ PHY/QHP: CPT | Performed by: OBSTETRICS & GYNECOLOGY

## 2019-04-24 NOTE — LETTER
19    Elayne Grewal MD  [address] [place] [address]  [place], [address]     RE: Darylene Rhody  : 1995   AGE: 20 y. o.     This report has been created using voice recognition software. It may contain errors which are inherent in voice recognition technology.     Dear Dr. Carrero Host:     I saw your patient Camden Morrison for the following indications:     Patient Active Problem List   Diagnosis    MDD (major depressive disorder)    H/O  delivery, currently pregnant    Previous  section complicating pregnancy, antepartum condition or complication     death in prior pregnancy, currently pregnant    Cervical funneling affecting pregnancy in second trimester    Short cervix during pregnancy in second trimester    Encounter for fetal anatomic survey      As you know, your patient is a 20 y. o. female, who is G5(2,1,1,2). She has an Estimated Date of Delivery: 19 based on her previous ultrasound assessment. Gaby Perez is currently 32 weeks 1 days gestation based on that assessment.      The patient has had no major problems since her last visit. She states that her fetal movement has been good. She has had no increased vaginal discharge, vaginal bleeding, back pain, dysuria, hematuria, or leaking of amniotic fluid.     The patient delivered at 23 weeks 6 days gestational age with her pregnancy in .  She had a  section delivery secondary to a placental abruption.  She received 17P injections with her next pregnancy and delivered at term.       She is to continue to receive weekly injections of 17-P until 36 weeks gestation, unless she has a clinical indication to discontinue the medication prior to that time.      The patient had a vaginal birth after  section.  She plans to have a vaginal birth with her current pregnancy.     The NST today was reactive with moderate variability and accelerations.  An occasional variable was noted. She was having asymptomatic uterine contractions. The contractions were irregular.      A fetal ultrasound assessment was performed.  A living garibay intrauterine fetus was present in the cephalic presentation with normal fetal heart motion and normal fetal motion noted.  The estimated fetal weight was at the 24th growth percentile.  The biometric measurements were consistent with poor fetal growth. The biparietal diameter and HC were <5th%. The abdominal circumference was at the 28th growth percentile.  The biophysical profile and cord Doppler studies were both reassuring.  There was no absence or reversal of end-diastolic flow.                                GENETIC SCREENING/TERATOLOGY COUNSELING                            (Includes patient, FTB, and any affected family members)     Patient Age > 35 Years NO   Thalassemia ( MVC<80) NO   Congential Heart Defect NO   Neural Tube Defect NO   Rich-Sachs NO   Sickle Cell Disease NO   Sickle Cell Trait NO   Sickle C Disease or Trait NO   Hemophilia NO   Muscular Dystrophy NO   Cystic Fibrosis NO   [place] Disease NO   Autism NO   Mental Retardation NO   History of Fragile X NO   Maternal Diabetes NO   Other Genetic Disease or Syndrome NO   Previous Child With Congenital Abnormality Not Listed NO   Recreational Drugs NO                                                   INFECTION HISTORY          HEPATITIS IMMUNIZED:  YES   HEPATITIS INFECTION:  NO   EXPOSURE TO TB NO   GENITAL HERPES    NO   PARVOVIRUS B-19 NO   CHICKEN POX  NO   MEASLES NO   STD NO   HIV NO   OTHER RASH OR VIRAL ILLNESS SINCE LMP NO   UTI RECURRENT NO   HPV NO         OB History    Para Term  AB Living   5 3 2 1 1 2   SAB TAB Ectopic Molar Multiple Live Births   1         3       # Outcome Date GA Lbr Oziel/2nd Weight Sex Delivery Anes PTL Lv   5 Current                     4 2018                 3 Term 17 37w0d   5 lb 11 oz (2.58 kg) M  EPI N JESSICA   2  13 23w6d 02:55 1 lb 10.1 oz (0.74 kg) M CS-LTranv   Y ND      Birth Comments: 3 wk nicu, passed after 3 wks   1 Term 01/31/10 37w0d   5 lb 1 oz (2.296 kg) M Vag-Spont None N JESSICA      PAST GYNECOLOGICAL  HISTORY:  Negative for abnormal pap smears. Negative for sexually transmitted diseases. Negative for cervical LEEP / conization /cryosurgery.    Positive for uterine surgery.   Negative for ovarian or tubal surgery.      Past Medical History:   Diagnosis Date    MDD (major depressive disorder) 2015    STD (female)           Past Surgical History:   Procedure Laterality Date    ABDOMEN SURGERY         C SECTION      SECTION            Allergies   Allergen Reactions    Pcn [Penicillins] Hives         Current Outpatient Prescriptions:     folic acid (FOLVITE) 1 MG tablet, take 1 tablet by mouth three times a day, Disp: , Rfl: 0    aspirin 81 MG tablet, Take 81 mg by mouth daily, Disp: , Rfl:     Prenatal MV-Min-Fe Fum-FA-DHA (PRENATAL 1 PO), Take by mouth, Disp: , Rfl:      Social History   Substance Use Topics    Smoking status: Former Smoker       Packs/day: 1.00       Years: 8.00       Types: Cigars    Smokeless tobacco: Never Used    Alcohol use No         Comment: social         FAMILY MEDICAL HISTORY:   Negative for congenital abnormalities, autism, genetic disease and mental retardation, not listed above.      Review of Systems :   CONSTITUTIONAL : No fever, no chills   HEENT : No headache, no visual changes, no rhinorrhea, no sore throat   CARDIOVASCULAR : No pain, no palpitations, no edema   RESPIRATORY : No pain, no shortness of breath   GASTROINTESTINAL : No N/V, no D/C, no abdominal pain   GENITOURINARY : No dysuria, hematuria and no incontinence   MUSCULOSKELETAL : No myalgia, No back pain  NEUROLOGICAL : No numbness, no tingling, no tremors.  No history of seizures are less than 10 major movements in an hour. Non-stress testing should be performed every 3 to 4 days through the balance of the pregnancy. Serial ultrasounds to assess fetal anatomy and growth should be performed. The patient is at increase risk for  morbidity and mortality secondary to her history. Weekly BPP and cord Doppler testing should be performed, unless there is a clinical indication to perform the testing more frequently.     The patient was advised to be sexually abstinent. She is to call is she has any increased vaginal discharge, vaginal bleeding, contractions, abdominal pain, back pain or any new significant symptomatology prior to her next visit. I advised her that these are signs and symptoms of cervical change and require follow-up assessment when they occur.      I requested the patient return for a follow-up assessment in 1 week unless there is a clinical reason for her to return prior to that time. She is to call if she has any problems or questions prior to her next visit. Further evaluation and management will be dependent on her clinical presentation and the results of her testing.      The patient is to continue to follow with you in your office for ongoing obstetric care.     I spent 17 minutes of direct contact time with the patient of which greater than 50% of the time was to discuss complications and problems related to her pregnancy. I answered all of her questions to her satisfaction.  I asked her to call if she had any additional questions prior to her next visit.       If you have any questions regarding her management, please contact me at your convenience and thank you for allowing me to participate in her care.     Sincerely,           Mary Raymond MD, [address], Christine [address]akur, [address] [address] Beena Agarwal, [facility]  Director [address] [place] [address]  [phone]

## 2019-04-24 NOTE — PROGRESS NOTES
NST completed. Baseline 145 with moderate variability+ accelelrations. Occasional variable deceleration noted.  Reactive per dr Abner Kennedy

## 2019-04-24 NOTE — PATIENT INSTRUCTIONS
You might be having an NST at your next appt. Please eat a large snack or breakfast before coming to office. Thank youCall your primary obstetrician with bleeding, leaking of fluid, abdominal tenderness, headache, blurry vision, epigastric pain and increased urinary frequency. Any questions contact [facility] at [phone]. If you are experiencing an emergency and need immediate help, call 911 or go to go emergency room or labor and delivery. Do kick counts after dinner. Call your primary obstetrician if less than 10 kicks in 2 hours after dinner. Call your primary obstetrician with bleeding, leaking of fluid, abdominal tenderness, headache, blurry vision, epigastric pain and increased urinary frequency. if you are sick, not feeling well or have an infectious process going on please reschedule your appointment by calling [phone]. Also if any family members are not feeling well, please do not bring them to your appointment. We appreciate your cooperation. We are doing this in order to protect our pregnant mothers+ their babies. Patient Education        Weeks 32 to 29 of Your Pregnancy: Care Instructions  Your Care Instructions    During the last few weeks of your pregnancy, you may have more aches and pains. It's important to rest when you can. Your growing baby is putting more pressure on your bladder. So you may need to urinate more often. Hemorrhoids are also common. These are painful, itchy veins in the rectal area. In the 36th week, most women have a test for group B streptococcus (GBS). GBS is a common bacteria that can live in the vagina and rectum. It can make your baby sick after birth. If you test positive, you will get antibiotics during labor. These will keep your baby from getting the bacteria. You may want to talk with your doctor about banking your baby's umbilical cord blood. This is the blood left in the cord after birth. If you want to save this blood, you must arrange it ahead of time.  You can't decide at the last minute. If you haven't already had the Tdap shot during this pregnancy, talk to your doctor about getting it. It will help protect your  against pertussis infection. Follow-up care is a key part of your treatment and safety. Be sure to make and go to all appointments, and call your doctor if you are having problems. It's also a good idea to know your test results and keep a list of the medicines you take. How can you care for yourself at home? Ease hemorrhoids  · Get more liquids, fruits, vegetables, and fiber in your diet. This will help keep your stools soft. · Avoid sitting for too long. Lie on your left side several times a day. · Clean yourself with soft, moist toilet paper. Or you can use witch hazel pads or personal hygiene pads. · If you are uncomfortable, try ice packs. Or you can sit in a warm sitz bath. Do these for 20 minutes at a time, as needed. · Use hydrocortisone cream for pain and itching. Two examples are Anusol and Preparation H Hydrocortisone. · Ask your doctor about taking an over-the-counter stool softener. Consider breastfeeding  · Experts recommend that women breastfeed for 1 year or longer. Breast milk is the perfect food for babies. · Breast milk is easier for babies to digest than formula. And it is always available, just the right temperature, and free. · Breast milk may help protect your child from some health problems.  babies are less likely than formula-fed babies to:  ? Get ear infections, colds, diarrhea, and pneumonia. ? Be obese or get diabetes later in life. · Women who breastfeed have less bleeding after the birth. Their uteruses also shrink back faster. · Some women who breastfeed lose weight faster. Making milk burns calories. · Breastfeeding can lower your risk of breast cancer, ovarian cancer, and osteoporosis.   Decide about circumcision for boys  · As you make this decision, it may help to think about your personal, Yarsanism, and family traditions. You get to decide if you will keep your son's penis natural or if he will be circumcised. · If you decide that you would like to have your baby circumcised, talk with your doctor. You can share your concerns about pain. And you can discuss your preferences for anesthesia. Where can you learn more? Go to https://chpepicewscooby.healthAJ Tech. org and sign in to your [employer] account. Enter G226 in the UPSIDO.com box to learn more about \"Weeks 32 to 34 of Your Pregnancy: Care Instructions. \"     If you do not have an account, please click on the \"Sign Up Now\" link. Current as of: September 5, 2018  Content Version: 11.9  © 4152-4302 [employer]. Care instructions adapted under license by [facility] ([facility]). If you have questions about a medical condition or this instruction, always ask your healthcare professional. [place] [address] any warranty or liability for your use of this information. Patient Education        Learning About When to Call Your Doctor During Pregnancy (After 20 Weeks)  Your Care Instructions  It's common to have concerns about what might be a problem during pregnancy. Although most pregnant women don't have any serious problems, it's important to know when to call your doctor if you have certain symptoms or signs of labor. These are general suggestions. Your doctor may give you some more information about when to call. When to call your doctor (after 20 weeks)  Call 911 anytime you think you may need emergency care. For example, call if:  · You have severe vaginal bleeding. · You have sudden, severe pain in your belly. · You passed out (lost consciousness). · You have a seizure. · You see or feel the umbilical cord. · You think you are about to deliver your baby and can't make it safely to the hospital.  Call your doctor now or seek immediate medical care if:  · You have vaginal bleeding.   · You have belly pain.  · You have a fever. · You have symptoms of preeclampsia, such as:  ? Sudden swelling of your face, hands, or feet. ? New vision problems (such as dimness or blurring). ? A severe headache. · You have a sudden release of fluid from your vagina. (You think your water broke.)  · You think that you may be in labor. This means that you've had at least 4 contractions within 20 minutes or at least 8 contractions in an hour. · You notice that your baby has stopped moving or is moving much less than normal.  · You have symptoms of a urinary tract infection. These may include:  ? Pain or burning when you urinate. ? A frequent need to urinate without being able to pass much urine. ? Pain in the flank, which is just below the rib cage and above the waist on either side of the back. ? Blood in your urine. Watch closely for changes in your health, and be sure to contact your doctor if:  · You have vaginal discharge that smells bad. · You have skin changes, such as:  ? A rash. ? Itching. ? Yellow color to your skin. · You have other concerns about your pregnancy. If you have labor signs at 37 weeks or more  If you have signs of labor at 37 weeks or more, your doctor may tell you to call when your labor becomes more active. Symptoms of active labor include:  · Contractions that are regular. · Contractions that are less than 5 minutes apart. · Contractions that are hard to talk through. Follow-up care is a key part of your treatment and safety. Be sure to make and go to all appointments, and call your doctor if you are having problems. It's also a good idea to know your test results and keep a list of the medicines you take. Where can you learn more? Go to https://tavia.healthGraphic Stadium. org and sign in to your Cinpost account. Enter  in the Navegg box to learn more about \"Learning About When to Call Your Doctor During Pregnancy (After 20 Weeks). \"     If you do not have an account, please click on the \"Sign Up Now\" link. Current as of: September 5, 2018  Content Version: 11.9  © 3001-4749 [employer]. Care instructions adapted under license by [email or web address] ([facility]). If you have questions about a medical condition or this instruction, always ask your healthcare professional. [address] any warranty or liability for your use of this information. Patient Education        Counting Your Baby's Kicks: Care Instructions  Your Care Instructions    Counting your baby's kicks is one way your doctor can tell that your baby is healthy. Most women--especially in a first pregnancy--feel their baby move for the first time between 16 and 22 weeks. The movement may feel like flutters rather than kicks. Your baby may move more at certain times of the day. When you are active, you may notice less kicking than when you are resting. At your prenatal visits, your doctor will ask whether the baby is active. In your last trimester, your doctor may ask you to count the number of times you feel your baby move. Follow-up care is a key part of your treatment and safety. Be sure to make and go to all appointments, and call your doctor if you are having problems. It's also a good idea to know your test results and keep a list of the medicines you take. How do you count fetal kicks? · A common method of checking your baby's movement is to count the number of kicks or moves you feel in 1 hour. Ten movements (such as kicks, flutters, or rolls) in 1 hour are normal. Some doctors suggest that you count in the morning until you get to 10 movements. Then you can quit for that day and start again the next day. · Pick your baby's most active time of day to count. This may be any time from morning to evening. · If you do not feel 10 movements in an hour, your baby may be sleeping. Wait for the next hour and count again. When should you call for help?   Call your doctor now or seek immediate medical care if:    · You noticed that your baby has stopped moving or is moving much less than normal.    Watch closely for changes in your health, and be sure to contact your doctor if you have any problems. Where can you learn more? Go to https://chpemontserrateb.StreamSpec. org and sign in to your Shopular account. Enter W138 in the Envio Networks box to learn more about \"Counting Your Baby's Kicks: Care Instructions. \"     If you do not have an account, please click on the \"Sign Up Now\" link. Current as of: September 5, 2018  Content Version: 11.9  © 8151-2339 [employer]orporated. Care instructions adapted under license by [facility] ([facility]). If you have questions about a medical condition or this instruction, always ask your healthcare professional. [address] any warranty or liability for your use of this information.

## 2019-04-25 LAB
GLUCOSE URINE, POC: NORMAL
PROTEIN UA: NEGATIVE

## 2019-05-01 ENCOUNTER — ROUTINE PRENATAL (OUTPATIENT)
Dept: OBGYN CLINIC | Age: 24
End: 2019-05-01
Payer: COMMERCIAL

## 2019-05-01 ENCOUNTER — TELEPHONE (OUTPATIENT)
Dept: OBGYN CLINIC | Age: 24
End: 2019-05-01

## 2019-05-01 VITALS
HEIGHT: 62 IN | DIASTOLIC BLOOD PRESSURE: 71 MMHG | BODY MASS INDEX: 23.74 KG/M2 | SYSTOLIC BLOOD PRESSURE: 108 MMHG | HEART RATE: 93 BPM | WEIGHT: 129 LBS

## 2019-05-01 DIAGNOSIS — O34.219 PREVIOUS CESAREAN SECTION COMPLICATING PREGNANCY, ANTEPARTUM CONDITION OR COMPLICATION: ICD-10-CM

## 2019-05-01 DIAGNOSIS — Z3A.33 33 WEEKS GESTATION OF PREGNANCY: ICD-10-CM

## 2019-05-01 DIAGNOSIS — O36.5930 POOR FETAL GROWTH AFFECTING MANAGEMENT OF MOTHER IN THIRD TRIMESTER, SINGLE OR UNSPECIFIED FETUS: ICD-10-CM

## 2019-05-01 DIAGNOSIS — O47.03 PREMATURE UTERINE CONTRACTIONS CAUSING THREATENED PREMATURE LABOR IN THIRD TRIMESTER: ICD-10-CM

## 2019-05-01 DIAGNOSIS — O26.872 SHORT CERVIX DURING PREGNANCY IN SECOND TRIMESTER: ICD-10-CM

## 2019-05-01 LAB
GLUCOSE URINE, POC: NORMAL
PROTEIN UA: NEGATIVE

## 2019-05-01 PROCEDURE — 81002 URINALYSIS NONAUTO W/O SCOPE: CPT | Performed by: OBSTETRICS & GYNECOLOGY

## 2019-05-01 PROCEDURE — 99213 OFFICE O/P EST LOW 20 MIN: CPT | Performed by: OBSTETRICS & GYNECOLOGY

## 2019-05-01 PROCEDURE — 76817 TRANSVAGINAL US OBSTETRIC: CPT | Performed by: OBSTETRICS & GYNECOLOGY

## 2019-05-01 PROCEDURE — G8420 CALC BMI NORM PARAMETERS: HCPCS | Performed by: OBSTETRICS & GYNECOLOGY

## 2019-05-01 PROCEDURE — 1036F TOBACCO NON-USER: CPT | Performed by: OBSTETRICS & GYNECOLOGY

## 2019-05-01 PROCEDURE — 76818 FETAL BIOPHYS PROFILE W/NST: CPT | Performed by: OBSTETRICS & GYNECOLOGY

## 2019-05-01 PROCEDURE — 76820 UMBILICAL ARTERY ECHO: CPT | Performed by: OBSTETRICS & GYNECOLOGY

## 2019-05-01 PROCEDURE — 96372 THER/PROPH/DIAG INJ SC/IM: CPT | Performed by: OBSTETRICS & GYNECOLOGY

## 2019-05-01 PROCEDURE — 99211 OFF/OP EST MAY X REQ PHY/QHP: CPT | Performed by: OBSTETRICS & GYNECOLOGY

## 2019-05-01 PROCEDURE — G8427 DOCREV CUR MEDS BY ELIG CLIN: HCPCS | Performed by: OBSTETRICS & GYNECOLOGY

## 2019-05-01 NOTE — LETTER
19    Annie Padilla MD  [address] [place] [address]  [place], [address]     RE: Gavin Hernandez  : 1995   AGE: 20 y. o.     This report has been created using voice recognition software. It may contain errors which are inherent in voice recognition technology.     Dear Dr. Graciela Tesfaye:     I saw your patient Issac Thomson for the following indications:     Patient Active Problem List   Diagnosis    MDD (major depressive disorder)    H/O  delivery, currently pregnant    Previous  section complicating pregnancy, antepartum condition or complication     death in prior pregnancy, currently pregnant    Cervical funneling affecting pregnancy in second trimester    Short cervix during pregnancy in second trimester    Encounter for fetal anatomic survey      As you know, your patient is a 20 y. o. female, who is G5(2,1,1,2). She has an Estimated Date of Delivery: 19 based on her previous ultrasound assessment. Edi Ferguson is currently 33 weeks 1 days gestation based on that assessment.      The patient has had no major problems since her last visit. She states that her fetal movement has been good. She has had no increased vaginal discharge, vaginal bleeding, back pain, dysuria, hematuria, or leaking of amniotic fluid.     The patient delivered at 23 weeks 6 days gestational age with her pregnancy in .  She had a  section delivery secondary to a placental abruption.  She received 17P injections with her next pregnancy and delivered at term.       She is to continue to receive weekly injections of 17-P until 36 weeks gestation, unless she has a clinical indication to discontinue the medication prior to that time.      The patient had a vaginal birth after  section.  She plans to have a vaginal birth with her current pregnancy.     The NST today was reactive with moderate variability and accelerations.  She has an occasional uterine contraction.       A fetal ultrasound assessment was performed.  A living garibay intrauterine fetus was present in the cephalic presentation with normal fetal heart motion and normal fetal motion noted.  The estimated fetal weight was at the 33rd growth percentile. The biophysical profile and cord Doppler studies were both reassuring.  There was no absence or reversal of end-diastolic flow.                                GENETIC SCREENING/TERATOLOGY COUNSELING                            (Includes patient, FTB, and any affected family members)     Patient Age > 35 Years NO   Thalassemia ( MVC<80) NO   Congential Heart Defect NO   Neural Tube Defect NO   Rich-Sachs NO   Sickle Cell Disease NO   Sickle Cell Trait NO   Sickle C Disease or Trait NO   Hemophilia NO   Muscular Dystrophy NO   Cystic Fibrosis NO   [place] Disease NO   Autism NO   Mental Retardation NO   History of Fragile X NO   Maternal Diabetes NO   Other Genetic Disease or Syndrome NO   Previous Child With Congenital Abnormality Not Listed NO   Recreational Drugs NO                                                   INFECTION HISTORY          HEPATITIS IMMUNIZED:  YES   HEPATITIS INFECTION:  NO   EXPOSURE TO TB NO   GENITAL HERPES    NO   PARVOVIRUS B-19 NO   CHICKEN POX  NO   MEASLES NO   STD NO   HIV NO   OTHER RASH OR VIRAL ILLNESS SINCE LMP NO   UTI RECURRENT NO   HPV NO         OB History    Para Term  AB Living   5 3 2 1 1 2   SAB TAB Ectopic Molar Multiple Live Births   1         3       # Outcome Date GA Lbr Oziel/2nd Weight Sex Delivery Anes PTL Lv   5 Current                     4 SAB                    3 Term /17 37w0d   5 lb 11 oz (2.58 kg) M  EPI N JESSICA   2  13 23w6d 02:55 1 lb 10.1 oz (0.74 kg) M CS-LTranv   Y ND      Birth Comments: 3 wk nicu, passed after 3 wks   1 Term 01/31/10 37w0d   5 lb 1 oz (2.296 kg) M Vag-Spont None N JESSICA      PAST GYNECOLOGICAL  HISTORY:  Negative for abnormal pap smears. Negative for sexually transmitted diseases. Negative for cervical LEEP / conization /cryosurgery.    Positive for uterine surgery.   Negative for ovarian or tubal surgery.      Past Medical History:   Diagnosis Date    MDD (major depressive disorder) 2015    STD (female)           Past Surgical History:   Procedure Laterality Date    ABDOMEN SURGERY         C SECTION      SECTION            Allergies   Allergen Reactions    Pcn [Penicillins] Hives      Current Outpatient Prescriptions:     folic acid (FOLVITE) 1 MG tablet, take 1 tablet by mouth three times a day    aspirin 81 MG tablet, Take 81 mg by mouth daily    Prenatal MV-Min-Fe Fum-FA-DHA (PRENATAL 1 PO), Take by mouth     Social History   Substance Use Topics    Smoking status: Former Smoker       Packs/day: 1.00       Years: 8.00       Types: Cigars    Smokeless tobacco: Never Used    Alcohol use No         Comment: social      FAMILY MEDICAL HISTORY:   Negative for congenital abnormalities, autism, genetic disease and mental retardation, not listed above.      Review of Systems :   CONSTITUTIONAL : No fever, no chills   HEENT : No headache, no visual changes, no rhinorrhea, no sore throat   CARDIOVASCULAR : No pain, no palpitations, no edema   RESPIRATORY : No pain, no shortness of breath   GASTROINTESTINAL : No N/V, no D/C, no abdominal pain   GENITOURINARY : No dysuria, hematuria and no incontinence   MUSCULOSKELETAL : No myalgia, No back pain  NEUROLOGICAL : No numbness, no tingling, no tremors.  No history of seizures  ALL OTHER SYSTEMS WERE REPORTED AS NEGATIVE.     PERTINENT PHYSICAL EXAMINATION:   /71   Pulse 93   Ht 5' 2\" (1.575 m)   Wt 129 lb (58.5 kg)   LMP 2018   BMI 23.59 kg/m²   Urine dipstick:   Negative Glucose  Negative Protein     GENERAL:   The patient is a well developed, female who is alert cooperative and oriented times three in no acute distress.     HEENT: more frequently.     The patient was advised to be sexually abstinent. She is to call is she has any increased vaginal discharge, vaginal bleeding, contractions, abdominal pain, back pain or any new significant symptomatology prior to her next visit. I advised her that these are signs and symptoms of cervical change and require follow-up assessment when they occur.      I requested the patient return for a follow-up assessment in 1 week unless there is a clinical reason for her to return prior to that time. She is to call if she has any problems or questions prior to her next visit. Further evaluation and management will be dependent on her clinical presentation and the results of her testing.      The patient is to continue to follow with you in your office for ongoing obstetric care.     I spent 19 minutes of direct contact time with the patient of which greater than 50% of the time was to discuss complications and problems related to her pregnancy. I answered all of her questions to her satisfaction.  I asked her to call if she had any additional questions prior to her next visit.       If you have any questions regarding her management, please contact me at your convenience and thank you for allowing me to participate in her care.     Sincerely,           Tiffanie Easley MD, [address], Kamila Dural, [address] [facility], [address] Beena Agarwal T  Director [address] [place] [address]  [phone]

## 2019-05-01 NOTE — PROGRESS NOTES
NST completed. Baseline 145 with moderate variability+ accelelrations. Occasional ctx noted.  Reactive per dr Altagracia Echols

## 2019-05-01 NOTE — PATIENT INSTRUCTIONS
You might be having an NST at your next appt. Please eat a large snack or breakfast before coming to office. Thank youCall your primary obstetrician with bleeding, leaking of fluid, abdominal tenderness, headache, blurry vision, epigastric pain and increased urinary frequency. Any questions contact [facility] at [phone]. If you are experiencing an emergency and need immediate help, call 911 or go to go emergency room or labor and delivery. Do kick counts after dinner. Call your primary obstetrician if less than 10 kicks in 2 hours after dinner. Call your primary obstetrician with bleeding, leaking of fluid, abdominal tenderness, headache, blurry vision, epigastric pain and increased urinary frequency. if you are sick, not feeling well or have an infectious process going on please reschedule your appointment by calling [phone]. Also if any family members are not feeling well, please do not bring them to your appointment. We appreciate your cooperation. We are doing this in order to protect our pregnant mothers+ their babies. Patient Education        Weeks 32 to 29 of Your Pregnancy: Care Instructions  Your Care Instructions    During the last few weeks of your pregnancy, you may have more aches and pains. It's important to rest when you can. Your growing baby is putting more pressure on your bladder. So you may need to urinate more often. Hemorrhoids are also common. These are painful, itchy veins in the rectal area. In the 36th week, most women have a test for group B streptococcus (GBS). GBS is a common bacteria that can live in the vagina and rectum. It can make your baby sick after birth. If you test positive, you will get antibiotics during labor. These will keep your baby from getting the bacteria. You may want to talk with your doctor about banking your baby's umbilical cord blood. This is the blood left in the cord after birth. If you want to save this blood, you must arrange it ahead of time.  You can't decide at the last minute. If you haven't already had the Tdap shot during this pregnancy, talk to your doctor about getting it. It will help protect your  against pertussis infection. Follow-up care is a key part of your treatment and safety. Be sure to make and go to all appointments, and call your doctor if you are having problems. It's also a good idea to know your test results and keep a list of the medicines you take. How can you care for yourself at home? Ease hemorrhoids  · Get more liquids, fruits, vegetables, and fiber in your diet. This will help keep your stools soft. · Avoid sitting for too long. Lie on your left side several times a day. · Clean yourself with soft, moist toilet paper. Or you can use witch hazel pads or personal hygiene pads. · If you are uncomfortable, try ice packs. Or you can sit in a warm sitz bath. Do these for 20 minutes at a time, as needed. · Use hydrocortisone cream for pain and itching. Two examples are Anusol and Preparation H Hydrocortisone. · Ask your doctor about taking an over-the-counter stool softener. Consider breastfeeding  · Experts recommend that women breastfeed for 1 year or longer. Breast milk is the perfect food for babies. · Breast milk is easier for babies to digest than formula. And it is always available, just the right temperature, and free. · Breast milk may help protect your child from some health problems.  babies are less likely than formula-fed babies to:  ? Get ear infections, colds, diarrhea, and pneumonia. ? Be obese or get diabetes later in life. · Women who breastfeed have less bleeding after the birth. Their uteruses also shrink back faster. · Some women who breastfeed lose weight faster. Making milk burns calories. · Breastfeeding can lower your risk of breast cancer, ovarian cancer, and osteoporosis.   Decide about circumcision for boys  · As you make this decision, it may help to think about your personal, pain.  · You have a fever. · You have symptoms of preeclampsia, such as:  ? Sudden swelling of your face, hands, or feet. ? New vision problems (such as dimness or blurring). ? A severe headache. · You have a sudden release of fluid from your vagina. (You think your water broke.)  · You think that you may be in labor. This means that you've had at least 4 contractions within 20 minutes or at least 8 contractions in an hour. · You notice that your baby has stopped moving or is moving much less than normal.  · You have symptoms of a urinary tract infection. These may include:  ? Pain or burning when you urinate. ? A frequent need to urinate without being able to pass much urine. ? Pain in the flank, which is just below the rib cage and above the waist on either side of the back. ? Blood in your urine. Watch closely for changes in your health, and be sure to contact your doctor if:  · You have vaginal discharge that smells bad. · You have skin changes, such as:  ? A rash. ? Itching. ? Yellow color to your skin. · You have other concerns about your pregnancy. If you have labor signs at 37 weeks or more  If you have signs of labor at 37 weeks or more, your doctor may tell you to call when your labor becomes more active. Symptoms of active labor include:  · Contractions that are regular. · Contractions that are less than 5 minutes apart. · Contractions that are hard to talk through. Follow-up care is a key part of your treatment and safety. Be sure to make and go to all appointments, and call your doctor if you are having problems. It's also a good idea to know your test results and keep a list of the medicines you take. Where can you learn more? Go to https://tavia.[employer]. org and sign in to your [employer] account. Enter  in the [employer] box to learn more about \"Learning About When to Call Your Doctor During Pregnancy (After 20 Weeks). \"     If you do not have an home?  · If your doctor prescribed medicines, take them exactly as directed. Call your doctor if you think you are having a problem with your medicine. · Rest until your doctor advises you about activity. He or she will tell you if you should stay in bed most of the time. You may need to arrange for  if you have young children. · Do not have sexual intercourse unless your doctor says it is safe. · Use pads, not tampons, if you have vaginal bleeding. · Make sure to drink plenty of fluids. Dehydration can lead to contractions. If you have kidney, heart, or liver disease and have to limit fluids, talk with your doctor before you increase the amount of fluids you drink. · Do not smoke or allow others to smoke around you. If you need help quitting, talk to your doctor about stop-smoking programs and medicines. These can increase your chances of quitting for good. When should you call for help? Call 911 anytime you think you may need emergency care. For example, call if:    · You passed out (lost consciousness).     · You have severe vaginal bleeding.     · You have severe pain in your belly or pelvis.     · You have had fluid gushing or leaking from your vagina and you know or think the umbilical cord is bulging into your vagina. If this happens, immediately get down on your knees so your rear end (buttocks) is higher than your head. This will decrease the pressure on the cord until help arrives.   [facility] your doctor now or seek immediate medical care if:    · You have signs of preeclampsia, such as:  ? Sudden swelling of your face, hands, or feet. ? New vision problems (such as dimness or blurring). ? A severe headache.     · You have any vaginal bleeding.     · You have belly pain or cramping.     · You have a fever.     · You have had regular contractions (with or without pain) for an hour.  This means that you have 6 or more within 1 hour after you change your position and drink fluids.     · You have a sudden release of fluid from the vagina.     · You have low back pain or pelvic pressure that does not go away.     · You notice that your baby has stopped moving or is moving much less than normal.    Watch closely for changes in your health, and be sure to contact your doctor if you have any problems. Where can you learn more? Go to https://chjuhi.health[employer]. org and sign in to your [employer] account. Enter Q400 in the [employer] box to learn more about \" Labor: Care Instructions. \"     If you do not have an account, please click on the \"Sign Up Now\" link. Current as of: 2018  Content Version: 11.9  © 9071-9832 [employer]. Care instructions adapted under license by [facility] ([facility]). If you have questions about a medical condition or this instruction, always ask your healthcare professional. [address] any warranty or liability for your use of this information.

## 2019-05-04 ENCOUNTER — APPOINTMENT (OUTPATIENT)
Dept: LABOR AND DELIVERY | Age: 24
End: 2019-05-04
Payer: COMMERCIAL

## 2019-05-04 ENCOUNTER — HOSPITAL ENCOUNTER (OUTPATIENT)
Age: 24
Discharge: HOME | End: 2019-05-04
Attending: FAMILY MEDICINE | Admitting: OBSTETRICS & GYNECOLOGY
Payer: COMMERCIAL

## 2019-05-04 VITALS — HEART RATE: 98 BPM | DIASTOLIC BLOOD PRESSURE: 65 MMHG | SYSTOLIC BLOOD PRESSURE: 114 MMHG

## 2019-05-04 PROBLEM — N88.3 SHORT CERVIX: Status: ACTIVE | Noted: 2019-05-04

## 2019-05-04 PROCEDURE — 59025 FETAL NON-STRESS TEST: CPT

## 2019-05-04 NOTE — PROGRESS NOTES
33w4d. Patient presents for scheduled NST for short cervix. Also has history of  demise and  labor. Denies vb, lof, decreased fm, and current ctx.

## 2019-05-04 NOTE — PROGRESS NOTES
Maternal Vitals  BP: 114/65  Pulse: 98    Testing  Reason for NST: Other (comment), Fetal well-being(short cervix)  Explained test to patient: Yes    Findings  Baseline: 145 BPM  Baseline Classification: Normal  Variability: Moderate  Decelerations: None  Acoustic Stimulator: No  Fetal Movement: Yes  Multiple Gestation?: No  Uterine contractions/10 min.: 2  Interpretation: Reactive  Interpreted by: dr Wendi Cantu      REactive       Plan: Home    [facility] ADDICTION [facility]  5/4/2019

## 2019-05-04 NOTE — PROGRESS NOTES
Pt given verbal and typed discharge instructions, verbalizes understanding. Ambulatory upon discharge, leaving with no complaints.

## 2019-05-08 ENCOUNTER — ROUTINE PRENATAL (OUTPATIENT)
Dept: OBGYN CLINIC | Age: 24
End: 2019-05-08
Payer: COMMERCIAL

## 2019-05-08 VITALS
DIASTOLIC BLOOD PRESSURE: 67 MMHG | SYSTOLIC BLOOD PRESSURE: 102 MMHG | HEIGHT: 62 IN | BODY MASS INDEX: 24.11 KG/M2 | HEART RATE: 91 BPM | WEIGHT: 131 LBS

## 2019-05-08 DIAGNOSIS — O47.03 PREMATURE UTERINE CONTRACTIONS CAUSING THREATENED PREMATURE LABOR IN THIRD TRIMESTER: ICD-10-CM

## 2019-05-08 DIAGNOSIS — O26.872 SHORT CERVIX DURING PREGNANCY IN SECOND TRIMESTER: ICD-10-CM

## 2019-05-08 DIAGNOSIS — Z3A.34 34 WEEKS GESTATION OF PREGNANCY: ICD-10-CM

## 2019-05-08 DIAGNOSIS — O36.5930 POOR FETAL GROWTH AFFECTING MANAGEMENT OF MOTHER IN THIRD TRIMESTER, SINGLE OR UNSPECIFIED FETUS: ICD-10-CM

## 2019-05-08 DIAGNOSIS — O09.299 NEONATAL DEATH IN PRIOR PREGNANCY, CURRENTLY PREGNANT: ICD-10-CM

## 2019-05-08 DIAGNOSIS — Z87.898 HISTORY OF POOR FETAL GROWTH: Primary | ICD-10-CM

## 2019-05-08 DIAGNOSIS — O34.219 PREVIOUS CESAREAN SECTION COMPLICATING PREGNANCY, ANTEPARTUM CONDITION OR COMPLICATION: ICD-10-CM

## 2019-05-08 DIAGNOSIS — N88.3 SHORT CERVIX: ICD-10-CM

## 2019-05-08 LAB
GLUCOSE URINE, POC: NORMAL
PROTEIN UA: POSITIVE

## 2019-05-08 PROCEDURE — 99213 OFFICE O/P EST LOW 20 MIN: CPT | Performed by: OBSTETRICS & GYNECOLOGY

## 2019-05-08 PROCEDURE — 81002 URINALYSIS NONAUTO W/O SCOPE: CPT | Performed by: OBSTETRICS & GYNECOLOGY

## 2019-05-08 PROCEDURE — 99211 OFF/OP EST MAY X REQ PHY/QHP: CPT | Performed by: OBSTETRICS & GYNECOLOGY

## 2019-05-08 PROCEDURE — 76818 FETAL BIOPHYS PROFILE W/NST: CPT | Performed by: OBSTETRICS & GYNECOLOGY

## 2019-05-08 PROCEDURE — G8427 DOCREV CUR MEDS BY ELIG CLIN: HCPCS | Performed by: OBSTETRICS & GYNECOLOGY

## 2019-05-08 PROCEDURE — 1036F TOBACCO NON-USER: CPT | Performed by: OBSTETRICS & GYNECOLOGY

## 2019-05-08 PROCEDURE — G8420 CALC BMI NORM PARAMETERS: HCPCS | Performed by: OBSTETRICS & GYNECOLOGY

## 2019-05-08 NOTE — LETTER
19    Lauren Jansen MD  [address] [place] [address]  [place], [address]     RE: Devona Romberg  : 1995   AGE: 20 y. o.     This report has been created using voice recognition software. It may contain errors which are inherent in voice recognition technology.     Dear Dr. Rafa Narvaez:     I saw your patient Maria Del Carmen Hernandez for the following indications:     Patient Active Problem List   Diagnosis    MDD (major depressive disorder)    H/O  delivery, currently pregnant    Previous  section complicating pregnancy, antepartum condition or complication     death in prior pregnancy, currently pregnant    Cervical funneling affecting pregnancy in second trimester    Short cervix during pregnancy in second trimester    Encounter for fetal anatomic survey      As you know, your patient is a 20 y. o. female, who is G5(2,1,1,2). She has an Estimated Date of Delivery: 19 based on her previous ultrasound assessment. Travon Guillen is currently 34 weeks 1 days gestation based on that assessment.      The patient has had no major problems since her last visit. She states that her fetal movement has been good. She has had no increased vaginal discharge, vaginal bleeding, back pain, dysuria, hematuria, or leaking of amniotic fluid. She is having intermittent uterine contractions.     The patient delivered at 23 weeks 6 days gestational age with her pregnancy in .  She had a  section delivery secondary to a placental abruption.  She received 17P injections with her next pregnancy and delivered at term.       She is to continue to receive weekly injections of 17-P until 36 weeks gestation, unless she has a clinical indication to discontinue the medication prior to that time.      The patient had a vaginal birth after  section.  She plans to have a vaginal birth with her current pregnancy.     The NST today was reactive with moderate variability and accelerations. She has irregular uterine contractions.        A fetal ultrasound assessment was performed.  A living garibay intrauterine fetus was present in the cephalic presentation with normal fetal heart motion and normal fetal motion noted.  The estimated fetal weight was at the 33rd growth percentile. The amniotic fluid index was 17.6 cm.   The biophysical profile and cord Doppler studies were both reassuring.  There was no absence or reversal of end-diastolic flow.                                GENETIC SCREENING/TERATOLOGY COUNSELING                            (Includes patient, FTB, and any affected family members)     Patient Age > 35 Years NO   Thalassemia ( MVC<80) NO   Congential Heart Defect NO   Neural Tube Defect NO   Rich-Sachs NO   Sickle Cell Disease NO   Sickle Cell Trait NO   Sickle C Disease or Trait NO   Hemophilia NO   Muscular Dystrophy NO   Cystic Fibrosis NO   [place] Disease NO   Autism NO   Mental Retardation NO   History of Fragile X NO   Maternal Diabetes NO   Other Genetic Disease or Syndrome NO   Previous Child With Congenital Abnormality Not Listed NO   Recreational Drugs NO                                                   INFECTION HISTORY          HEPATITIS IMMUNIZED:  YES   HEPATITIS INFECTION:  NO   EXPOSURE TO TB NO   GENITAL HERPES    NO   PARVOVIRUS B-19 NO   CHICKEN POX  NO   MEASLES NO   STD NO   HIV NO   OTHER RASH OR VIRAL ILLNESS SINCE LMP NO   UTI RECURRENT NO   HPV NO         OB History    Para Term  AB Living   5 3 2 1 1 2   SAB TAB Ectopic Molar Multiple Live Births   1         3       # Outcome Date GA Lbr Oziel/2nd Weight Sex Delivery Anes PTL Lv   5 Current                     4 SAB                    3 Term 17 37w0d   5 lb 11 oz (2.58 kg) M  EPI N JESSICA   2  13 23w6d 02:55 1 lb 10.1 oz (0.74 kg) M CS-LTranv   Y ND      Birth Comments: 3 wk nicu, passed after 3 wks   1 Term 01/31/10 37w0d   5 lb 1 oz (2.296 kg) M Vag-Spont None N JESSICA    PAST GYNECOLOGICAL  HISTORY:  Negative for abnormal pap smears. Negative for sexually transmitted diseases. Negative for cervical LEEP / conization /cryosurgery.    Positive for uterine surgery.   Negative for ovarian or tubal surgery.      Past Medical History:   Diagnosis Date    MDD (major depressive disorder) 2015    STD (female)           Past Surgical History:   Procedure Laterality Date    ABDOMEN SURGERY         C SECTION      SECTION            Allergies   Allergen Reactions    Pcn [Penicillins] Hives      Current Outpatient Prescriptions:     folic acid (FOLVITE) 1 MG tablet, take 1 tablet by mouth three times a day    aspirin 81 MG tablet, Take 81 mg by mouth daily    Prenatal MV-Min-Fe Fum-FA-DHA (PRENATAL 1 PO), Take by mouth     Social History   Substance Use Topics    Smoking status: Former Smoker       Packs/day: 1.00       Years: 8.00       Types: Cigars    Smokeless tobacco: Never Used    Alcohol use No         Comment: social      FAMILY MEDICAL HISTORY:   Negative for congenital abnormalities, autism, genetic disease and mental retardation, not listed above.      Review of Systems :   CONSTITUTIONAL : No fever, no chills   HEENT : No headache, no visual changes, no rhinorrhea, no sore throat   CARDIOVASCULAR : No pain, no palpitations, no edema   RESPIRATORY : No pain, no shortness of breath   GASTROINTESTINAL : No N/V, no D/C, no abdominal pain   GENITOURINARY : No dysuria, hematuria and no incontinence   MUSCULOSKELETAL : No myalgia, No back pain  NEUROLOGICAL : No numbness, no tingling, no tremors.  No history of seizures  ALL OTHER SYSTEMS WERE REPORTED AS NEGATIVE.     PERTINENT PHYSICAL EXAMINATION:   /67   Pulse 91   Ht 5' 2\" (1.575 m)   Wt 131 lb (59.4 kg)   LMP 2018   BMI 23.96 kg/m²   Urine dipstick:   Negative Glucose  Negative Protein     GENERAL:   The patient is a well developed, female who is alert cooperative and oriented times three in no acute distress.     HEENT:  Normo cephalic and atraumatic. No facial edema.      ABDOMEN:   Her uterus is gravid. She had no complaint of abdominal pain or tenderness. The fetal heart rate is 140 bpm.      EXTREMITIES:  No peripheral edema is noted.       A fetal ultrasound assessment was performed today. A report is enclosed for your review.     IMPRESSION:    1.  IUP at 34 weeks 1 days gestation based on her Estimated Date of Delivery: 19    2.  Previous  delivery at 23 weeks with placenta abruption     3.  Term delivery prior to and after #2.    4.  Had 17-P with her most recent pregnancy    5.  Short cervix with funneling of the amniotic membranes on previous examinations.     6.  Previous  with 23 week delivery    7.  History of depression     8.  Prior  demise from extreme prematurity     9.   with her last pregnancy   10.  Reactive NST with irregular uterine contractions   11.  Reassuring BPP and cord Doppler testing  12.  Poor fetal growth as noted above on the previous scan   13. SP Celestone and MgSO4     PLAN:  She is to continue to receive weekly injections of 17-P until 36 weeks gestation, unless she has a clinical indication to discontinue the medication prior to that time.      I would recommend that the patient count fetal movements and call if she notices any subjective decrease in fetal movements, particularly if there are less than 10 major movements in an hour. Non-stress testing should be performed every 3 to 4 days through the balance of the pregnancy. Serial ultrasounds to assess fetal anatomy and growth should be performed. The patient is at increase risk for  morbidity and mortality secondary to her history. Weekly BPP and cord Doppler testing should be performed, unless there is a clinical indication to perform the testing more frequently.     The patient was advised to be sexually abstinent.  She is to call is she has any increased vaginal discharge, vaginal bleeding, contractions, abdominal pain, back pain or any new significant symptomatology prior to her next visit. I advised her that these are signs and symptoms of cervical change and require follow-up assessment when they occur.      I requested the patient return for a follow-up assessment in 1 week unless there is a clinical reason for her to return prior to that time. She is to call if she has any problems or questions prior to her next visit. Further evaluation and management will be dependent on her clinical presentation and the results of her testing.      The patient is to continue to follow with you in your office for ongoing obstetric care.     I spent 16 minutes of direct contact time with the patient of which greater than 50% of the time was to discuss complications and problems related to her pregnancy. I answered all of her questions to her satisfaction.  I asked her to call if she had any additional questions prior to her next visit.       If you have any questions regarding her management, please contact me at your convenience and thank you for allowing me to participate in her care.     Sincerely,           Bambi Forbes MD, [address], Steven [place], [facility], [address], [facility]  Director [address] [place] [address]  [phone]

## 2019-05-08 NOTE — PROGRESS NOTES
No c/o. Good fetal movement. Kick counts encouraged. Denies bleeding,lof,ctx. All questions answered+ information confirmed by pt

## 2019-05-08 NOTE — PATIENT INSTRUCTIONS
You might be having an NST at your next appt. Please eat a large snack or breakfast before coming to office. Thank youCall your primary obstetrician with bleeding, leaking of fluid, abdominal tenderness, headache, blurry vision, epigastric pain and increased urinary frequency. Any questions contact [facility] at [phone]. If you are experiencing an emergency and need immediate help, call 911 or go to go emergency room or labor and delivery. Do kick counts after dinner. Call your primary obstetrician if less than 10 kicks in 2 hours after dinner. Call your primary obstetrician with bleeding, leaking of fluid, abdominal tenderness, headache, blurry vision, epigastric pain and increased urinary frequency. if you are sick, not feeling well or have an infectious process going on please reschedule your appointment by calling [phone]. Also if any family members are not feeling well, please do not bring them to your appointment. We appreciate your cooperation. We are doing this in order to protect our pregnant mothers+ their babies. Patient Education        Weeks 34 to 39 of Your Pregnancy: Care Instructions  Your Care Instructions    By now, your baby and your belly have grown quite large. It is almost time to give birth. A full-term pregnancy can deliver between 37 and 42 weeks. Your baby's lungs are almost ready to breathe air. The bones in your baby's head are now firm enough to protect it, but soft enough to move down through the birth canal.  You may feel excited, happy, anxious, or scared. You may wonder how you will know if you are in labor or what to expect during labor. Try to be flexible in your expectations of the birth. Because each birth is different, there is no way to know exactly what childbirth will be like for you. This care sheet will help you know what to expect and how to prepare. This may make your childbirth easier.   If you haven't already had the Tdap shot during this pregnancy, talk to your doctor about getting it. It will help protect your  against pertussis infection. In the 36th week, most women have a test for group B streptococcus (GBS). GBS is a common bacteria that can live in the vagina and rectum. It can make your baby sick after birth. If you test positive, you will get antibiotics during labor. The medicine will keep your baby from getting the bacteria. Follow-up care is a key part of your treatment and safety. Be sure to make and go to all appointments, and call your doctor if you are having problems. It's also a good idea to know your test results and keep a list of the medicines you take. How can you care for yourself at home? Learn about pain relief choices  · Pain is different for every woman. Talk with your doctor about your feelings about pain. · You can choose from several types of pain relief. These include medicine or breathing techniques, as well as comfort measures. You can use more than one option. · If you choose to have pain medicine during labor, talk to your doctor about your options. Some medicines lower anxiety and help with some of the pain. Others make your lower body numb so that you won't feel pain. · Be sure to tell your doctor about your pain medicine choice before you start labor or very early in your labor. You may be able to change your mind as labor progresses. · Rarely, a woman is put to sleep by medicine given through a mask or an IV. Labor and delivery  · The first stage of labor has three parts: early, active, and transition. ? Most women have early labor at home. You can stay busy or rest, eat light snacks, drink clear fluids, and start counting contractions. ? When talking during a contraction gets hard, you may be moving to active labor. During active labor, you should head for the hospital if you are not there already. ? You are in active labor when contractions come every 3 to 4 minutes and last about 60 seconds.  Your cervix is opening more rapidly. ? If your water breaks, contractions will come faster and stronger. ? During transition, your cervix is stretching, and contractions are coming more rapidly. ? You may want to push, but your cervix might not be ready. Your doctor will tell you when to push. · The second stage starts when your cervix is completely opened and you are ready to push. ? Contractions are very strong to push the baby down the birth canal.  ? You will feel the urge to push. You may feel like you need to have a bowel movement. ? You may be coached to push with contractions. These contractions will be very strong, but you will not have them as often. You can get a little rest between contractions. ? You may be emotional and irritable. You may not be aware of what is going on around you.  ? One last push, and your baby is born. · The third stage is when a few more contractions push out the placenta. This may take 30 minutes or less. · The fourth stage is the welcome recovery. You may feel overwhelmed with emotions and exhausted but alert. This is a good time to start breastfeeding. Where can you learn more? Go to https://Fluid.Fleck - The Bigger Picture. org and sign in to your Neurosearch account. Enter Z994 in the Spark Etail box to learn more about \"Weeks 34 to 36 of Your Pregnancy: Care Instructions. \"     If you do not have an account, please click on the \"Sign Up Now\" link. Current as of: September 5, 2018  Content Version: 12.0  © 7434-0090 Healthwise, Incorporated. Care instructions adapted under license by [facility] ([facility]). If you have questions about a medical condition or this instruction, always ask your healthcare professional. [place] [address] any warranty or liability for your use of this information.          Patient Education        Learning About When to Call Your Doctor During Pregnancy (After 20 Weeks)  Your Care Instructions  It's common to have concerns about what might be a problem during pregnancy. Although most pregnant women don't have any serious problems, it's important to know when to call your doctor if you have certain symptoms or signs of labor. These are general suggestions. Your doctor may give you some more information about when to call. When to call your doctor (after 20 weeks)  Call 911 anytime you think you may need emergency care. For example, call if:  · You have severe vaginal bleeding. · You have sudden, severe pain in your belly. · You passed out (lost consciousness). · You have a seizure. · You see or feel the umbilical cord. · You think you are about to deliver your baby and can't make it safely to the hospital.  Call your doctor now or seek immediate medical care if:  · You have vaginal bleeding. · You have belly pain. · You have a fever. · You have symptoms of preeclampsia, such as:  ? Sudden swelling of your face, hands, or feet. ? New vision problems (such as dimness or blurring). ? A severe headache. · You have a sudden release of fluid from your vagina. (You think your water broke.)  · You think that you may be in labor. This means that you've had at least 4 contractions within 20 minutes or at least 8 contractions in an hour. · You notice that your baby has stopped moving or is moving much less than normal.  · You have symptoms of a urinary tract infection. These may include:  ? Pain or burning when you urinate. ? A frequent need to urinate without being able to pass much urine. ? Pain in the flank, which is just below the rib cage and above the waist on either side of the back. ? Blood in your urine. Watch closely for changes in your health, and be sure to contact your doctor if:  · You have vaginal discharge that smells bad. · You have skin changes, such as:  ? A rash. ? Itching. ? Yellow color to your skin. · You have other concerns about your pregnancy.   If you have labor signs at 37 weeks or more  If you have signs of labor at 40 weeks or more, your doctor may tell you to call when your labor becomes more active. Symptoms of active labor include:  · Contractions that are regular. · Contractions that are less than 5 minutes apart. · Contractions that are hard to talk through. Follow-up care is a key part of your treatment and safety. Be sure to make and go to all appointments, and call your doctor if you are having problems. It's also a good idea to know your test results and keep a list of the medicines you take. Where can you learn more? Go to https://chperoyereweb.fos4X. org and sign in to your NoteWagon account. Enter  in the Loop88 box to learn more about \"Learning About When to Call Your Doctor During Pregnancy (After 20 Weeks). \"     If you do not have an account, please click on the \"Sign Up Now\" link. Current as of: September 5, 2018  Content Version: 12.0  © 2567-1097 [employer]tec Internet. Care instructions adapted under license by [facility] ([facility]). If you have questions about a medical condition or this instruction, always ask your healthcare professional. [place] [address] any warranty or liability for your use of this information. Patient Education        Counting Your Baby's Kicks: Care Instructions  Your Care Instructions    Counting your baby's kicks is one way your doctor can tell that your baby is healthy. Most women--especially in a first pregnancy--feel their baby move for the first time between 16 and 22 weeks. The movement may feel like flutters rather than kicks. Your baby may move more at certain times of the day. When you are active, you may notice less kicking than when you are resting. At your prenatal visits, your doctor will ask whether the baby is active. In your last trimester, your doctor may ask you to count the number of times you feel your baby move. Follow-up care is a key part of your treatment and safety.  Be sure to make and go to all appointments, and call your doctor if you are having problems. It's also a good idea to know your test results and keep a list of the medicines you take. How do you count fetal kicks? · A common method of checking your baby's movement is to count the number of kicks or moves you feel in 1 hour. Ten movements (such as kicks, flutters, or rolls) in 1 hour are normal. Some doctors suggest that you count in the morning until you get to 10 movements. Then you can quit for that day and start again the next day. · Pick your baby's most active time of day to count. This may be any time from morning to evening. · If you do not feel 10 movements in an hour, your baby may be sleeping. Wait for the next hour and count again. When should you call for help? Call your doctor now or seek immediate medical care if:    · You noticed that your baby has stopped moving or is moving much less than normal.    Watch closely for changes in your health, and be sure to contact your doctor if you have any problems. Where can you learn more? Go to https://Kairos.Guard RFID Solutions. org and sign in to your SwapBeats account. Enter H613 in the Adrenaline Mobility box to learn more about \"Counting Your Baby's Kicks: Care Instructions. \"     If you do not have an account, please click on the \"Sign Up Now\" link. Current as of: 2018  Content Version: 12.0  © 2934-2533 [employer]TEGRATED BI[employer]. Care instructions adapted under license by [facility] ([facility]). If you have questions about a medical condition or this instruction, always ask your healthcare professional. [place] [address] any warranty or liability for your use of this information. Patient Education         Labor: Care Instructions  Your Care Instructions     labor is the start of labor between 21 and 36 weeks of pregnancy. A full-term pregnancy lasts 37 to 42 weeks. In labor, the uterus contracts to open the cervix.  This is the first stage of childbirth.  labor can be caused by a problem with the baby, the mother, or both. Often the cause is not known. In some cases, doctors use medicines to try to delay labor until 29 or more weeks of pregnancy. By this time, a baby has grown enough so that problems are not likely. In some cases--such as with a serious infection--it is healthier for the baby to be born early. Your treatment will depend on how far along you are in your pregnancy and on your health and your baby's health. Follow-up care is a key part of your treatment and safety. Be sure to make and go to all appointments, and call your doctor if you are having problems. It's also a good idea to know your test results and keep a list of the medicines you take. How can you care for yourself at home? · If your doctor prescribed medicines, take them exactly as directed. Call your doctor if you think you are having a problem with your medicine. · Rest until your doctor advises you about activity. He or she will tell you if you should stay in bed most of the time. You may need to arrange for  if you have young children. · Do not have sexual intercourse unless your doctor says it is safe. · Use pads, not tampons, if you have vaginal bleeding. · Make sure to drink plenty of fluids. Dehydration can lead to contractions. If you have kidney, heart, or liver disease and have to limit fluids, talk with your doctor before you increase the amount of fluids you drink. · Do not smoke or allow others to smoke around you. If you need help quitting, talk to your doctor about stop-smoking programs and medicines. These can increase your chances of quitting for good. When should you call for help? Call 911 anytime you think you may need emergency care.  For example, call if:    · You passed out (lost consciousness).     · You have severe vaginal bleeding.     · You have severe pain in your belly or pelvis.     · You have had fluid gushing or leaking from your vagina and you know or think the umbilical cord is bulging into your vagina. If this happens, immediately get down on your knees so your rear end (buttocks) is higher than your head. This will decrease the pressure on the cord until help arrives.   [facility] your doctor now or seek immediate medical care if:    · You have signs of preeclampsia, such as:  ? Sudden swelling of your face, hands, or feet. ? New vision problems (such as dimness or blurring). ? A severe headache.     · You have any vaginal bleeding.     · You have belly pain or cramping.     · You have a fever.     · You have had regular contractions (with or without pain) for an hour. This means that you have 6 or more within 1 hour after you change your position and drink fluids.     · You have a sudden release of fluid from the vagina.     · You have low back pain or pelvic pressure that does not go away.     · You notice that your baby has stopped moving or is moving much less than normal.    Watch closely for changes in your health, and be sure to contact your doctor if you have any problems. Where can you learn more? Go to https://SkyBullspeAmeri-tech 3D.Mobile Experience. org and sign in to your [employer] account. Enter Q400 in the Iggli box to learn more about \" Labor: Care Instructions. \"     If you do not have an account, please click on the \"Sign Up Now\" link. Current as of: 2018  Content Version: 12.0  © 9761-2770 Healthwise, [place]. Care instructions adapted under license by [facility] ([facility]). If you have questions about a medical condition or this instruction, always ask your healthcare professional. [place] [address] any warranty or liability for your use of this information.

## 2019-05-14 ENCOUNTER — HOSPITAL ENCOUNTER (INPATIENT)
Age: 24
LOS: 3 days | Discharge: HOME | DRG: 560 | End: 2019-05-17
Attending: FAMILY MEDICINE | Admitting: FAMILY MEDICINE
Payer: COMMERCIAL

## 2019-05-14 PROBLEM — Z3A.35 35 WEEKS GESTATION OF PREGNANCY: Status: ACTIVE | Noted: 2019-05-14

## 2019-05-14 LAB
ABO/RH: NORMAL
AMNISURE CONTROL (POC): NEGATIVE
AMPHETAMINE SCREEN, URINE: NOT DETECTED
ANTIBODY SCREEN: NORMAL
BACTERIA: ABNORMAL /HPF
BARBITURATE SCREEN URINE: NOT DETECTED
BENZODIAZEPINE SCREEN, URINE: NOT DETECTED
BILIRUBIN URINE: NEGATIVE
BLOOD, URINE: ABNORMAL
CANNABINOID SCREEN URINE: POSITIVE
CLARITY: CLEAR
COCAINE METABOLITE SCREEN URINE: NOT DETECTED
COLOR: ABNORMAL
EPITHELIAL CELLS, UA: ABNORMAL /HPF
GLUCOSE URINE: 500 MG/DL
HCT VFR BLD CALC: 32.4 % (ref 34–48)
HEMOGLOBIN: 10.7 G/DL (ref 11.5–15.5)
KETONES, URINE: ABNORMAL MG/DL
LEUKOCYTE ESTERASE, URINE: ABNORMAL
MCH RBC QN AUTO: 30.6 PG (ref 26–35)
MCHC RBC AUTO-ENTMCNC: 33 % (ref 32–34.5)
MCV RBC AUTO: 92.6 FL (ref 80–99.9)
METHADONE SCREEN, URINE: NOT DETECTED
NITRITE, URINE: NEGATIVE
OPIATE SCREEN URINE: NOT DETECTED
PDW BLD-RTO: 13.2 FL (ref 11.5–15)
PH UA: 5.5 (ref 5–9)
PHENCYCLIDINE SCREEN URINE: NOT DETECTED
PLATELET # BLD: 252 E9/L (ref 130–450)
PMV BLD AUTO: 10.2 FL (ref 7–12)
PROPOXYPHENE SCREEN: NOT DETECTED
PROTEIN UA: 100 MG/DL
RBC # BLD: 3.5 E12/L (ref 3.5–5.5)
RBC UA: >20 /HPF (ref 0–2)
SPECIFIC GRAVITY UA: 1.02 (ref 1–1.03)
UROBILINOGEN, URINE: 0.2 E.U./DL
WBC # BLD: 12.1 E9/L (ref 4.5–11.5)
WBC UA: >20 /HPF (ref 0–5)

## 2019-05-14 PROCEDURE — 6360000002 HC RX W HCPCS: Performed by: FAMILY MEDICINE

## 2019-05-14 PROCEDURE — G0480 DRUG TEST DEF 1-7 CLASSES: HCPCS

## 2019-05-14 PROCEDURE — 86850 RBC ANTIBODY SCREEN: CPT

## 2019-05-14 PROCEDURE — 2580000003 HC RX 258: Performed by: OBSTETRICS & GYNECOLOGY

## 2019-05-14 PROCEDURE — 6360000002 HC RX W HCPCS: Performed by: OBSTETRICS & GYNECOLOGY

## 2019-05-14 PROCEDURE — 6370000000 HC RX 637 (ALT 250 FOR IP): Performed by: OBSTETRICS & GYNECOLOGY

## 2019-05-14 PROCEDURE — 86900 BLOOD TYPING SEROLOGIC ABO: CPT

## 2019-05-14 PROCEDURE — 81001 URINALYSIS AUTO W/SCOPE: CPT

## 2019-05-14 PROCEDURE — 85027 COMPLETE CBC AUTOMATED: CPT

## 2019-05-14 PROCEDURE — 86901 BLOOD TYPING SEROLOGIC RH(D): CPT

## 2019-05-14 PROCEDURE — 6360000002 HC RX W HCPCS

## 2019-05-14 PROCEDURE — 36415 COLL VENOUS BLD VENIPUNCTURE: CPT

## 2019-05-14 PROCEDURE — 87088 URINE BACTERIA CULTURE: CPT

## 2019-05-14 PROCEDURE — 1220000000 HC SEMI PRIVATE OB R&B

## 2019-05-14 PROCEDURE — 80307 DRUG TEST PRSMV CHEM ANLYZR: CPT

## 2019-05-14 RX ORDER — BETAMETHASONE SODIUM PHOSPHATE AND BETAMETHASONE ACETATE 3; 3 MG/ML; MG/ML
12 INJECTION, SUSPENSION INTRA-ARTICULAR; INTRALESIONAL; INTRAMUSCULAR; SOFT TISSUE DAILY
Status: COMPLETED | OUTPATIENT
Start: 2019-05-14 | End: 2019-05-15

## 2019-05-14 RX ORDER — SODIUM CHLORIDE 0.9 % (FLUSH) 0.9 %
10 SYRINGE (ML) INJECTION EVERY 12 HOURS SCHEDULED
Status: DISCONTINUED | OUTPATIENT
Start: 2019-05-14 | End: 2019-05-15 | Stop reason: SDUPTHER

## 2019-05-14 RX ORDER — SODIUM CHLORIDE, SODIUM LACTATE, POTASSIUM CHLORIDE, AND CALCIUM CHLORIDE .6; .31; .03; .02 G/100ML; G/100ML; G/100ML; G/100ML
1000 INJECTION, SOLUTION INTRAVENOUS ONCE
Status: COMPLETED | OUTPATIENT
Start: 2019-05-14 | End: 2019-05-14

## 2019-05-14 RX ORDER — ACETAMINOPHEN 325 MG/1
650 TABLET ORAL EVERY 4 HOURS PRN
Status: DISCONTINUED | OUTPATIENT
Start: 2019-05-14 | End: 2019-05-15 | Stop reason: SDUPTHER

## 2019-05-14 RX ORDER — DOCUSATE SODIUM 100 MG/1
100 CAPSULE, LIQUID FILLED ORAL 2 TIMES DAILY
Status: DISCONTINUED | OUTPATIENT
Start: 2019-05-14 | End: 2019-05-15 | Stop reason: SDUPTHER

## 2019-05-14 RX ORDER — SODIUM CHLORIDE 0.9 % (FLUSH) 0.9 %
10 SYRINGE (ML) INJECTION PRN
Status: DISCONTINUED | OUTPATIENT
Start: 2019-05-14 | End: 2019-05-15 | Stop reason: SDUPTHER

## 2019-05-14 RX ORDER — ONDANSETRON 2 MG/ML
4 INJECTION INTRAMUSCULAR; INTRAVENOUS EVERY 6 HOURS PRN
Status: DISCONTINUED | OUTPATIENT
Start: 2019-05-14 | End: 2019-05-15 | Stop reason: HOSPADM

## 2019-05-14 RX ORDER — SODIUM CHLORIDE, SODIUM LACTATE, POTASSIUM CHLORIDE, CALCIUM CHLORIDE 600; 310; 30; 20 MG/100ML; MG/100ML; MG/100ML; MG/100ML
INJECTION, SOLUTION INTRAVENOUS CONTINUOUS
Status: DISCONTINUED | OUTPATIENT
Start: 2019-05-14 | End: 2019-05-15 | Stop reason: SDUPTHER

## 2019-05-14 RX ADMIN — Medication 1 MG: at 16:06

## 2019-05-14 RX ADMIN — VANCOMYCIN HYDROCHLORIDE 1000 MG: 1 INJECTION, POWDER, LYOPHILIZED, FOR SOLUTION INTRAVENOUS at 16:38

## 2019-05-14 RX ADMIN — ACETAMINOPHEN 650 MG: 325 TABLET ORAL at 15:14

## 2019-05-14 RX ADMIN — BUTORPHANOL TARTRATE 2 MG: 2 INJECTION, SOLUTION INTRAMUSCULAR; INTRAVENOUS at 20:35

## 2019-05-14 RX ADMIN — BETAMETHASONE SODIUM PHOSPHATE AND BETAMETHASONE ACETATE 2 MG: 3; 3 INJECTION, SUSPENSION INTRA-ARTICULAR; INTRALESIONAL; INTRAMUSCULAR at 14:59

## 2019-05-14 RX ADMIN — BUTORPHANOL TARTRATE 1 MG: 2 INJECTION, SOLUTION INTRAMUSCULAR; INTRAVENOUS at 16:06

## 2019-05-14 RX ADMIN — SODIUM CHLORIDE, POTASSIUM CHLORIDE, SODIUM LACTATE AND CALCIUM CHLORIDE: 600; 310; 30; 20 INJECTION, SOLUTION INTRAVENOUS at 20:08

## 2019-05-14 RX ADMIN — SODIUM CHLORIDE, POTASSIUM CHLORIDE, SODIUM LACTATE AND CALCIUM CHLORIDE 1000 ML: 600; 310; 30; 20 INJECTION, SOLUTION INTRAVENOUS at 14:59

## 2019-05-14 ASSESSMENT — PAIN SCALES - GENERAL
PAINLEVEL_OUTOF10: 10
PAINLEVEL_OUTOF10: 10
PAINLEVEL_OUTOF10: 7
PAINLEVEL_OUTOF10: 7

## 2019-05-14 NOTE — H&P
Department of Obstetrics and Gynecology  Attending Obstetrics History and Physical        CHIEF COMPLAINT:  contractions    HISTORY OF PRESENT ILLNESS:    The patient is a 20 y.o. female [id number], Patient's last menstrual period was 2018.,  at 35w0d presents stating when she went to the restroom there was a small amount of blood on the toilet paper. She thinks her membranes ruptured and is having painful contractions. OB History        5    Para   3    Term   2       1    AB   1    Living   2       SAB   1    TAB        Ectopic        Molar        Multiple        Live Births   3                Estimated Due Date: Estimated Date of Delivery: 19    PRENATAL CARE:  Uncomplicated, 2nd pregnancy was , 3rd was  and pt plans on     Complicated by:   Patient Active Problem List   Diagnosis Code    MDD (major depressive disorder) F32.9    H/O  delivery, currently pregnant O1.18    Previous  section complicating pregnancy, antepartum condition or complication B51.834     death in prior pregnancy, currently pregnant O09.299    Short cervix during pregnancy in second trimester O30.80    Poor fetal growth affecting management of mother in third trimester O45.26    Premature uterine contractions causing threatened premature labor in third trimester O47.03    Multiparity Z64.1    Short cervix N88.3    35 weeks gestation of pregnancy Z3A.35       PAST OB HISTORY  OB History        5    Para   3    Term   2       1    AB   1    Living   2       SAB   1    TAB        Ectopic        Molar        Multiple        Live Births   3                Past Medical History:        Diagnosis Date    MDD (major depressive disorder) 2015    STD (female)      Past Surgical History:        Procedure Laterality Date    ABDOMEN SURGERY      C SECTION      SECTION       Social History:    TOBACCO:   reports that she has quit smoking.  Her smoking use included cigars. She has a 8.00 pack-year smoking history. She has never used smokeless tobacco.  ETOH:   reports that she does not drink alcohol. DRUGS:   reports that she does not use drugs. Family History:   History reviewed. No pertinent family history. Medications Prior to Admission:  Medications Prior to Admission: folic acid (FOLVITE) 1 MG tablet, take 1 tablet by mouth three times a day  aspirin 81 MG tablet, Take 81 mg by mouth daily  Prenatal MV-Min-Fe Fum-FA-DHA (PRENATAL 1 PO), Take by mouth  Allergies:  Pcn [penicillins]    Review of Systems:   Ears, nose, mouth, throat, and face: negative  Respiratory: negative  Cardiovascular: negative  Gastrointestinal: negative  Genitourinary:negative  Integument/breast: negative  Hematologic/lymphatic: negative  Musculoskeletal:negative  Neurological: negative  Behavioral/Psych: negative  Endocrine: negative  Allergic/Immunologic: negative  Psychosocial: negative    PHYSICAL EXAM:    General appearance:  awake, alert, cooperative, no apparent distress, and appears stated age  Neurologic:  Awake, alert, oriented to name, place and time. Cranial nerves II-XII are grossly intact.     Lungs:  clear to auscultation bilaterally  Heart:  regular rate and rhythm  Abdomen:   soft, non-distended, non-tender, no masses palpated, gravid  Fetal heart rate:  Baseline Heart Rate 150, accelerations:  present, decels:absent  Pelvis:  External Genitalia: no lesions  Cervix:  DILATION:  3 cm  EFFACEMENT:   100%  STATION:  -3  CONSISTENCY:  soft    Contraction frequency:  q4-5 minutes  Membranes:  Intact  Presentation: cephalic- confirmed by US  /62   Pulse 85   Temp 98.4 °F (36.9 °C) (Oral)   Resp 16   LMP 06/24/2018     GBS  unknown    ASSESSMENT AND PLAN:  D/w Dr. Kimberly Barlow for labor  IV fluid bolus  Celestone  Antibiotics for GBS        Electronically signed by Christy Palomares MD on 5/14/2019 at 1:55 PM

## 2019-05-15 LAB
BILIRUBIN URINE: NEGATIVE
BLOOD, URINE: NEGATIVE
CLARITY: CLEAR
COLOR: YELLOW
GLUCOSE URINE: 250 MG/DL
HCT VFR BLD CALC: 29.8 % (ref 34–48)
HCT VFR BLD CALC: 32.3 % (ref 34–48)
HEMOGLOBIN: 10.7 G/DL (ref 11.5–15.5)
HEMOGLOBIN: 9.9 G/DL (ref 11.5–15.5)
KETONES, URINE: 40 MG/DL
LEUKOCYTE ESTERASE, URINE: NEGATIVE
MCH RBC QN AUTO: 30.7 PG (ref 26–35)
MCH RBC QN AUTO: 31 PG (ref 26–35)
MCHC RBC AUTO-ENTMCNC: 33.1 % (ref 32–34.5)
MCHC RBC AUTO-ENTMCNC: 33.2 % (ref 32–34.5)
MCV RBC AUTO: 92.5 FL (ref 80–99.9)
MCV RBC AUTO: 93.6 FL (ref 80–99.9)
NITRITE, URINE: NEGATIVE
PDW BLD-RTO: 13.1 FL (ref 11.5–15)
PDW BLD-RTO: 13.2 FL (ref 11.5–15)
PH UA: 6.5 (ref 5–9)
PLATELET # BLD: 235 E9/L (ref 130–450)
PLATELET # BLD: 239 E9/L (ref 130–450)
PMV BLD AUTO: 10 FL (ref 7–12)
PMV BLD AUTO: 10.3 FL (ref 7–12)
PROTEIN UA: NEGATIVE MG/DL
RBC # BLD: 3.22 E12/L (ref 3.5–5.5)
RBC # BLD: 3.45 E12/L (ref 3.5–5.5)
SPECIFIC GRAVITY UA: 1.02 (ref 1–1.03)
UROBILINOGEN, URINE: 0.2 E.U./DL
WBC # BLD: 20.2 E9/L (ref 4.5–11.5)
WBC # BLD: 22.9 E9/L (ref 4.5–11.5)

## 2019-05-15 PROCEDURE — 6370000000 HC RX 637 (ALT 250 FOR IP)

## 2019-05-15 PROCEDURE — 6360000002 HC RX W HCPCS

## 2019-05-15 PROCEDURE — 36415 COLL VENOUS BLD VENIPUNCTURE: CPT

## 2019-05-15 PROCEDURE — 6370000000 HC RX 637 (ALT 250 FOR IP): Performed by: FAMILY MEDICINE

## 2019-05-15 PROCEDURE — 7200000001 HC VAGINAL DELIVERY

## 2019-05-15 PROCEDURE — 2580000003 HC RX 258: Performed by: FAMILY MEDICINE

## 2019-05-15 PROCEDURE — 81003 URINALYSIS AUTO W/O SCOPE: CPT

## 2019-05-15 PROCEDURE — 2580000003 HC RX 258: Performed by: OBSTETRICS & GYNECOLOGY

## 2019-05-15 PROCEDURE — 88307 TISSUE EXAM BY PATHOLOGIST: CPT

## 2019-05-15 PROCEDURE — 6360000002 HC RX W HCPCS: Performed by: OBSTETRICS & GYNECOLOGY

## 2019-05-15 PROCEDURE — 6360000002 HC RX W HCPCS: Performed by: FAMILY MEDICINE

## 2019-05-15 PROCEDURE — 1220000000 HC SEMI PRIVATE OB R&B

## 2019-05-15 PROCEDURE — 4A0HXCZ MEASUREMENT OF PRODUCTS OF CONCEPTION, CARDIAC RATE, EXTERNAL APPROACH: ICD-10-PCS | Performed by: FAMILY MEDICINE

## 2019-05-15 PROCEDURE — 85027 COMPLETE CBC AUTOMATED: CPT

## 2019-05-15 RX ORDER — LIDOCAINE HYDROCHLORIDE 10 MG/ML
INJECTION, SOLUTION INFILTRATION; PERINEURAL
Status: DISCONTINUED
Start: 2019-05-15 | End: 2019-05-15 | Stop reason: WASHOUT

## 2019-05-15 RX ORDER — DOCUSATE SODIUM 100 MG/1
100 CAPSULE, LIQUID FILLED ORAL 2 TIMES DAILY
Status: DISCONTINUED | OUTPATIENT
Start: 2019-05-15 | End: 2019-05-17 | Stop reason: HOSPADM

## 2019-05-15 RX ORDER — NALOXONE HYDROCHLORIDE 0.4 MG/ML
0.4 INJECTION, SOLUTION INTRAMUSCULAR; INTRAVENOUS; SUBCUTANEOUS PRN
Status: DISCONTINUED | OUTPATIENT
Start: 2019-05-15 | End: 2019-05-15 | Stop reason: HOSPADM

## 2019-05-15 RX ORDER — SODIUM CHLORIDE 0.9 % (FLUSH) 0.9 %
10 SYRINGE (ML) INJECTION EVERY 12 HOURS SCHEDULED
Status: DISCONTINUED | OUTPATIENT
Start: 2019-05-15 | End: 2019-05-17 | Stop reason: HOSPADM

## 2019-05-15 RX ORDER — LANOLIN 100 %
OINTMENT (GRAM) TOPICAL PRN
Status: DISCONTINUED | OUTPATIENT
Start: 2019-05-15 | End: 2019-05-17 | Stop reason: HOSPADM

## 2019-05-15 RX ORDER — ONDANSETRON 2 MG/ML
4 INJECTION INTRAMUSCULAR; INTRAVENOUS EVERY 6 HOURS PRN
Status: DISCONTINUED | OUTPATIENT
Start: 2019-05-15 | End: 2019-05-15 | Stop reason: HOSPADM

## 2019-05-15 RX ORDER — SODIUM CHLORIDE, SODIUM LACTATE, POTASSIUM CHLORIDE, CALCIUM CHLORIDE 600; 310; 30; 20 MG/100ML; MG/100ML; MG/100ML; MG/100ML
INJECTION, SOLUTION INTRAVENOUS CONTINUOUS
Status: DISCONTINUED | OUTPATIENT
Start: 2019-05-15 | End: 2019-05-17 | Stop reason: HOSPADM

## 2019-05-15 RX ORDER — IBUPROFEN 800 MG/1
TABLET ORAL
Status: COMPLETED
Start: 2019-05-15 | End: 2019-05-15

## 2019-05-15 RX ORDER — ACETAMINOPHEN 650 MG
TABLET, EXTENDED RELEASE ORAL
Status: COMPLETED
Start: 2019-05-15 | End: 2019-05-15

## 2019-05-15 RX ORDER — ONDANSETRON 4 MG/1
8 TABLET, FILM COATED ORAL EVERY 8 HOURS PRN
Status: DISCONTINUED | OUTPATIENT
Start: 2019-05-15 | End: 2019-05-17 | Stop reason: HOSPADM

## 2019-05-15 RX ORDER — TRAMADOL HYDROCHLORIDE 50 MG/1
50 TABLET ORAL EVERY 6 HOURS PRN
Status: DISCONTINUED | OUTPATIENT
Start: 2019-05-15 | End: 2019-05-17 | Stop reason: HOSPADM

## 2019-05-15 RX ORDER — SODIUM CHLORIDE 0.9 % (FLUSH) 0.9 %
10 SYRINGE (ML) INJECTION PRN
Status: DISCONTINUED | OUTPATIENT
Start: 2019-05-15 | End: 2019-05-17 | Stop reason: HOSPADM

## 2019-05-15 RX ORDER — BISACODYL 10 MG
10 SUPPOSITORY, RECTAL RECTAL DAILY PRN
Status: DISCONTINUED | OUTPATIENT
Start: 2019-05-15 | End: 2019-05-17 | Stop reason: HOSPADM

## 2019-05-15 RX ORDER — METHYLERGONOVINE MALEATE 0.2 MG/ML
200 INJECTION INTRAVENOUS ONCE
Status: COMPLETED | OUTPATIENT
Start: 2019-05-15 | End: 2019-05-15

## 2019-05-15 RX ORDER — IBUPROFEN 800 MG/1
800 TABLET ORAL EVERY 8 HOURS
Status: DISCONTINUED | OUTPATIENT
Start: 2019-05-15 | End: 2019-05-17 | Stop reason: HOSPADM

## 2019-05-15 RX ORDER — ACETAMINOPHEN 650 MG
TABLET, EXTENDED RELEASE ORAL PRN
Status: DISCONTINUED | OUTPATIENT
Start: 2019-05-15 | End: 2019-05-17 | Stop reason: HOSPADM

## 2019-05-15 RX ORDER — FERROUS SULFATE 325(65) MG
325 TABLET ORAL 2 TIMES DAILY WITH MEALS
Status: DISCONTINUED | OUTPATIENT
Start: 2019-05-15 | End: 2019-05-17 | Stop reason: HOSPADM

## 2019-05-15 RX ORDER — PANTOPRAZOLE SODIUM 40 MG/1
40 TABLET, DELAYED RELEASE ORAL DAILY
Status: DISCONTINUED | OUTPATIENT
Start: 2019-05-15 | End: 2019-05-17 | Stop reason: HOSPADM

## 2019-05-15 RX ORDER — NALBUPHINE HCL 10 MG/ML
5 AMPUL (ML) INJECTION EVERY 4 HOURS PRN
Status: DISCONTINUED | OUTPATIENT
Start: 2019-05-15 | End: 2019-05-15 | Stop reason: HOSPADM

## 2019-05-15 RX ORDER — METHYLERGONOVINE MALEATE 0.2 MG/ML
200 INJECTION INTRAVENOUS
Status: DISPENSED | OUTPATIENT
Start: 2019-05-15 | End: 2019-05-15

## 2019-05-15 RX ORDER — TRAMADOL HYDROCHLORIDE 50 MG/1
TABLET ORAL
Status: COMPLETED
Start: 2019-05-15 | End: 2019-05-15

## 2019-05-15 RX ORDER — MISOPROSTOL 200 UG/1
400 TABLET ORAL ONCE
Status: COMPLETED | OUTPATIENT
Start: 2019-05-15 | End: 2019-05-15

## 2019-05-15 RX ORDER — POLYETHYLENE GLYCOL 3350 17 G/17G
17 POWDER, FOR SOLUTION ORAL DAILY
Status: DISCONTINUED | OUTPATIENT
Start: 2019-05-15 | End: 2019-05-17 | Stop reason: HOSPADM

## 2019-05-15 RX ORDER — ACETAMINOPHEN 325 MG/1
650 TABLET ORAL EVERY 4 HOURS PRN
Status: DISCONTINUED | OUTPATIENT
Start: 2019-05-15 | End: 2019-05-17 | Stop reason: HOSPADM

## 2019-05-15 RX ORDER — SIMETHICONE 80 MG
80 TABLET,CHEWABLE ORAL EVERY 6 HOURS PRN
Status: DISCONTINUED | OUTPATIENT
Start: 2019-05-15 | End: 2019-05-17 | Stop reason: HOSPADM

## 2019-05-15 RX ADMIN — Medication: at 08:46

## 2019-05-15 RX ADMIN — Medication 999 MILLI-UNITS/MIN: at 08:51

## 2019-05-15 RX ADMIN — MISOPROSTOL 400 MCG: 200 TABLET ORAL at 12:14

## 2019-05-15 RX ADMIN — POLYETHYLENE GLYCOL 3350 17 G: 17 POWDER, FOR SOLUTION ORAL at 16:49

## 2019-05-15 RX ADMIN — IBUPROFEN 800 MG: 800 TABLET ORAL at 09:46

## 2019-05-15 RX ADMIN — METHYLERGONOVINE MALEATE 200 MCG: 0.2 INJECTION, SOLUTION INTRAMUSCULAR; INTRAVENOUS at 11:58

## 2019-05-15 RX ADMIN — ACETAMINOPHEN 650 MG: 325 TABLET ORAL at 20:51

## 2019-05-15 RX ADMIN — DOCUSATE SODIUM 100 MG: 100 CAPSULE, LIQUID FILLED ORAL at 20:48

## 2019-05-15 RX ADMIN — Medication 10 ML: at 20:48

## 2019-05-15 RX ADMIN — VANCOMYCIN HYDROCHLORIDE 1000 MG: 1 INJECTION, POWDER, LYOPHILIZED, FOR SOLUTION INTRAVENOUS at 04:16

## 2019-05-15 RX ADMIN — TRAMADOL HYDROCHLORIDE 50 MG: 50 TABLET, FILM COATED ORAL at 21:36

## 2019-05-15 RX ADMIN — IBUPROFEN 800 MG: 800 TABLET ORAL at 16:48

## 2019-05-15 RX ADMIN — BENZOCAINE AND LEVOMENTHOL: 200; 5 SPRAY TOPICAL at 12:20

## 2019-05-15 RX ADMIN — Medication 999 MILLI-UNITS/MIN: at 11:55

## 2019-05-15 RX ADMIN — BETAMETHASONE SODIUM PHOSPHATE AND BETAMETHASONE ACETATE 12 MG: 3; 3 INJECTION, SUSPENSION INTRA-ARTICULAR; INTRALESIONAL; INTRAMUSCULAR at 04:18

## 2019-05-15 RX ADMIN — BUTORPHANOL TARTRATE 2 MG: 2 INJECTION, SOLUTION INTRAMUSCULAR; INTRAVENOUS at 08:56

## 2019-05-15 RX ADMIN — FERROUS SULFATE TAB 325 MG (65 MG ELEMENTAL FE) 325 MG: 325 (65 FE) TAB at 19:07

## 2019-05-15 ASSESSMENT — PAIN SCALES - GENERAL
PAINLEVEL_OUTOF10: 10
PAINLEVEL_OUTOF10: 9
PAINLEVEL_OUTOF10: 10
PAINLEVEL_OUTOF10: 7

## 2019-05-15 NOTE — PROGRESS NOTES
Went in to patient's room because she asked to take a shower. Upon assessment, patient had soaked through 3 pads with blood and had a large clot under her. Performed fundal check and patient was semi-firm u-1 with blood coming out. Cleaned patient up and performed another fundal check and patient's fundas was boggy. Fundal massage was performed by a nurse as Dr Chung Loco was called. Dr Chung Loco called and updated. Ordered for another bag of pitocin to be hung and methergine IM to be given. Have Dr Camila Guy, house officer evaluate. Dr Camila Guy called to evaluate.

## 2019-05-15 NOTE — PROGRESS NOTES
Dr Luque Young in to evaluate, performed ultrasound and vaginal exam and obtained 400 cc of blood clots. Patient uterus is firm and u-1. Called Dr Luz Tran to update. Ordered for cytotec po x1, obtain a cbc now and then another in 4 hours. Fundal checks to be performed every 15 minutes and call with update.

## 2019-05-15 NOTE — PROGRESS NOTES
Pt ambulated to bathroom with stand by assist., pt tolerated well, pt voided and song care instructed.   New underwear pads applied and bed linens changed

## 2019-05-15 NOTE — LACTATION NOTE
Encouraged to offer frequent feeds. Baby has been sleepy. Set up Symphony pump at bedside, instructed on use. Encouraged to pump to stimulate milk production & supplement with her milk & formula as needed. Pt requests electric breast pump for home to increase her milk supply.

## 2019-05-15 NOTE — PROGRESS NOTES
Pt feeling like she needs to push, pt will not let us check her, Dr. Tristian Johnson on unit and checked pt, pt is complete and Dr. Tristian Johnson states she will call Dr. Nell Palma for delivery.

## 2019-05-15 NOTE — PROGRESS NOTES
Perla reevlauated myself, co of contractions and pain, received 2 doses of stadol overnight  4-5/100/-2 bulgign bag and bloody show on glove  fhts reactive  toco every 4-10 mins iregualrly  AP 35  here for rule labor, yesterday on my exam was 3-4 now with cervical change she is here for tolac, pervious succesful , discussed case with dr Rhea Wyman since also 35 wks and  will proceed to arom since with cervical change and regular contractions patient is in labor. Epidural when desired.

## 2019-05-15 NOTE — FLOWSHEET NOTE
Admitted to 305. Oriented to surroundings and safety precautions. Call light in reach. Bedside report given.

## 2019-05-15 NOTE — PROGRESS NOTES
Assumed care of pt from CHRIS Dockery RN. Pt on EFM in high fowlers, bolusing for an epidural.  Call light within reach.

## 2019-05-15 NOTE — PROGRESS NOTES
Pt ambulated to bathroom with stand by assist, pt voided and song care done by pt, pt doing well and helped into bed in room 305, bedside report given to Darren Shetty. Infant remains at bedside in room, crib at bedside.

## 2019-05-15 NOTE — FLOWSHEET NOTE
States voided in delivery. DTV here. Reminded to call for help.  RSA. Denies lightheadedness or dizziness.

## 2019-05-15 NOTE — PROGRESS NOTES
Progress Note  Asked to see patient due to increased bleeding per patient. Patient is s/p  x 2. ABD Fundus above umbilicus, deviated to right   Sono: Thin midline in fundus. Lower segment difficult to see. PEL  Organized clot in lower cervical/uterine segment. Approx 400cc clot expressed. Uterus now well below fundus. Cx contracted.   Could not palpate lower segment/scar    P:  Pitocin restarted       Cytotec per Dr. Sharif Lopez

## 2019-05-15 NOTE — PROCEDURES
Department of Obstetrics and Gynecology  Spontaneous Vaginal Delivery Note      Pre-operative Diagnosis:   pregnancy <37 weeks    Post-operative Diagnosis:  Living  infant(s) and Female    Information for the patient's :  Raúl Kruse [[id number]]                    Infant Wt:   Information for the patient's :  Raúl Kruse [[id number]]           APGARS:     Information for the patient's :  Raúl Kruse [[id number]]           Anesthesia:  none    Application and Delivery:       Delivery Summary: on 5/15/19 this  delivered a viable fmelae infant preciotously at 0847 apagars 9,9, no nuchal cord, wt 2110 gram 4 lbs 10 oz,  palcenta deld spont en; 200 uterus cervix vagina explored clots removed, mom baby stable pitocin given per ptocol. I arrived as baby delivered. Specimen:  Placenta sent to pathology     Intake/Output:       Condition:  infant stable to general nursery    Blood Type and Rh: O POS        Rubella Immunity Status:   Immune           Infant Feeding:    both breast and bottle - Enfamil    Attending Attestation: I was present and scrubbed for the procedure.

## 2019-05-15 NOTE — CARE COORDINATION
SW Discharge planning     SW noted positive UDS for THC on 5/15/19. SW met with Radha Hernandez ( [birth year]) ( [phone]) mother to baby girl Renard Abrams ( 5/15/19) and reported father of the baby,  Yann Murillo ( 7/6/86) by bedside. Nevaeh Dimas gave permission to speak freely in front of Nathalia Caldwell. Nevaeh Dimas reported that she resides at the address listed in the chart with her 3and 5year old sons, and Nathalia Caldwell. Nathalia Caldwell stated that he has three sons, the oldest being 13, and two other daughters that he has visitation with. Lewis Smith is currently unemployed and Nathalia Caldwell works for [employer]. Baby will be added to [employer]. Nevaeh Dimas stated that prenatal care was with Dr. Alvy Curling, and Dr. Mccracken will follow up with pediatric care. Nevaeh Dimas stated that she has all needed infant items for baby including a car seat and bassinet. Nevaeh Dimas denied any previous or current CSB involvement, legal history, or domestic violence. Nevaeh Dimas did state that she has a previous history of depression in 2013 after loosing her son due to premature birth at 20 weeks. Per Nevaeh Dimas, she did smoke THC during pregnancy and believes her last usage was about a month ago. Nevaeh Dimas denied any other history of substance abuse. Both Angela Lopez were polite and understanding for [facility]. They reported that they are already involved with [facility], and declined a [facility] referral at this time, reporting that they would look into it themselves and call if they were interested on their own. SÁNCHEZ completed a referral to [address] worker, Ryan Whatley at [employer] ( [phone]). PLAN    Baby can not be discharged until [employer] provides disposition.   SW to continue communication with [facility] ( [phone])      Electronically signed by ARLENE Brian on 5/15/2019 at 2:09 PM

## 2019-05-15 NOTE — PROGRESS NOTES
Dr. Alee Apple notified of blood work and that pt's fundus was firm at one below, no clots noted. Pt's vitals remain stable.   No new orders obtained

## 2019-05-15 NOTE — PROGRESS NOTES
Dr. Snowden Fuelling at bedside, pt starting to crown, Dr. Genet Sotelo still not here, bed set up for delivery

## 2019-05-15 NOTE — PROGRESS NOTES
Patient revaluated, 3-4/100/-2 irregular contractions rative  Strip,  Iv stadiol when needed , expectant managmetn

## 2019-05-16 LAB
BASOPHILS ABSOLUTE: 0.02 E9/L (ref 0–0.2)
BASOPHILS RELATIVE PERCENT: 0.1 % (ref 0–2)
EOSINOPHILS ABSOLUTE: 0 E9/L (ref 0.05–0.5)
EOSINOPHILS RELATIVE PERCENT: 0 % (ref 0–6)
HCT VFR BLD CALC: 25 % (ref 34–48)
HEMOGLOBIN: 8 G/DL (ref 11.5–15.5)
IMMATURE GRANULOCYTES #: 0.17 E9/L
IMMATURE GRANULOCYTES %: 1 % (ref 0–5)
LYMPHOCYTES ABSOLUTE: 1.89 E9/L (ref 1.5–4)
LYMPHOCYTES RELATIVE PERCENT: 11.2 % (ref 20–42)
MCH RBC QN AUTO: 30.1 PG (ref 26–35)
MCHC RBC AUTO-ENTMCNC: 32 % (ref 32–34.5)
MCV RBC AUTO: 94 FL (ref 80–99.9)
MONOCYTES ABSOLUTE: 0.93 E9/L (ref 0.1–0.95)
MONOCYTES RELATIVE PERCENT: 5.5 % (ref 2–12)
NEUTROPHILS ABSOLUTE: 13.85 E9/L (ref 1.8–7.3)
NEUTROPHILS RELATIVE PERCENT: 82.2 % (ref 43–80)
PDW BLD-RTO: 13.3 FL (ref 11.5–15)
PLATELET # BLD: 222 E9/L (ref 130–450)
PMV BLD AUTO: 10.3 FL (ref 7–12)
RBC # BLD: 2.66 E12/L (ref 3.5–5.5)
URINE CULTURE, ROUTINE: NORMAL
WBC # BLD: 16.9 E9/L (ref 4.5–11.5)

## 2019-05-16 PROCEDURE — 6370000000 HC RX 637 (ALT 250 FOR IP): Performed by: FAMILY MEDICINE

## 2019-05-16 PROCEDURE — 36415 COLL VENOUS BLD VENIPUNCTURE: CPT

## 2019-05-16 PROCEDURE — 1220000000 HC SEMI PRIVATE OB R&B

## 2019-05-16 PROCEDURE — 85025 COMPLETE CBC W/AUTO DIFF WBC: CPT

## 2019-05-16 RX ADMIN — DOCUSATE SODIUM 100 MG: 100 CAPSULE, LIQUID FILLED ORAL at 20:36

## 2019-05-16 RX ADMIN — POLYETHYLENE GLYCOL 3350 17 G: 17 POWDER, FOR SOLUTION ORAL at 08:49

## 2019-05-16 RX ADMIN — FERROUS SULFATE TAB 325 MG (65 MG ELEMENTAL FE) 325 MG: 325 (65 FE) TAB at 08:49

## 2019-05-16 RX ADMIN — IBUPROFEN 800 MG: 800 TABLET ORAL at 01:00

## 2019-05-16 RX ADMIN — TRAMADOL HYDROCHLORIDE 50 MG: 50 TABLET, FILM COATED ORAL at 23:14

## 2019-05-16 RX ADMIN — IBUPROFEN 800 MG: 800 TABLET ORAL at 08:48

## 2019-05-16 RX ADMIN — FERROUS SULFATE TAB 325 MG (65 MG ELEMENTAL FE) 325 MG: 325 (65 FE) TAB at 17:13

## 2019-05-16 RX ADMIN — IBUPROFEN 800 MG: 800 TABLET ORAL at 17:13

## 2019-05-16 RX ADMIN — DOCUSATE SODIUM 100 MG: 100 CAPSULE, LIQUID FILLED ORAL at 08:48

## 2019-05-16 ASSESSMENT — PAIN SCALES - GENERAL
PAINLEVEL_OUTOF10: 4
PAINLEVEL_OUTOF10: 2
PAINLEVEL_OUTOF10: 8
PAINLEVEL_OUTOF10: 3
PAINLEVEL_OUTOF10: 9

## 2019-05-16 NOTE — CARE COORDINATION
SW Discharge Planning     SW called and spoke to patient's [facility] , Earnest Rinaldi ( [phone]). Jcarlos Alesk reported that baby can be discharged home and that she would be following the family in the community.      PLAN   Baby can be discharged home per [facility]     Electronically signed by [facility] on 5/16/2019 at 10:05 AM

## 2019-05-16 NOTE — PLAN OF CARE
Problem: Infection - Risk of, Puerperal Infection:  Goal: Will show no infection signs and symptoms  Description  Will show no infection signs and symptoms  Outcome: Met This Shift     Problem: Mood - Altered:  Goal: Mood stable  Description  Mood stable  Outcome: Met This Shift     Problem: Pain:  Goal: Pain level will decrease  Description  Pain level will decrease  Outcome: Ongoing  Goal: Control of acute pain  Description  Control of acute pain  Outcome: Ongoing  Goal: Control of chronic pain  Description  Control of chronic pain  Outcome: Ongoing     Problem: Anxiety:  Goal: Level of anxiety will decrease  Description  Level of anxiety will decrease  5/15/2019 1000 by Bharathi Elaine RN  Outcome: Completed     Problem: Breathing Pattern - Ineffective:  Goal: Able to breathe comfortably  Description  Able to breathe comfortably  5/15/2019 1000 by Bharathi Elaine RN  Outcome: Completed     Problem: Fluid Volume - Imbalance:  Goal: Absence of imbalanced fluid volume signs and symptoms  Description  Absence of imbalanced fluid volume signs and symptoms  5/15/2019 1000 by Bharathi Elaine RN  Outcome: Completed  Goal: Absence of intrapartum hemorrhage signs and symptoms  Description  Absence of intrapartum hemorrhage signs and symptoms  5/15/2019 1000 by Bharathi Elaine RN  Outcome: Completed  Goal: Absence of postpartum hemorrhage signs and symptoms  Description  Absence of postpartum hemorrhage signs and symptoms  Outcome: Completed

## 2019-05-16 NOTE — PROGRESS NOTES
Department of Obstetrics and Gynecology  Labor and Delivery  Attending Post Partum Progress Note      SUBJECTIVE:  No sob cp or dizzyess, dec vag bkleeding yovana diet alicia controlled     OBJECTIVE:      Vitals:  /68   Pulse 61   Temp 98.3 °F (36.8 °C) (Oral)   Resp 16   Ht 5' 2\" (1.575 m)   Wt 130 lb (59 kg)   LMP 2018   Breastfeeding?  Unknown   BMI 23.78 kg/m²     CONSTITUTIONAL:  awake, alert, cooperative, no apparent distress, and appears stated age  LUNGS:  No increased work of breathing, good air exchange, clear to auscultation bilaterally, no crackles or wheezing  CARDIOVASCULAR:  Normal apical impulse, regular rate and rhythm, normal S1 and S2, no S3 or S4, and no murmur noted  ABDOMEN:  No scars, normal bowel sounds, soft, non-distended, non-tender, no masses palpated, no hepatosplenomegally  SKIN:  no bruising or bleeding    DATA:    CBC:    Lab Results   Component Value Date    WBC 16.9 2019    RBC 2.66 2019    HGB 8.0 2019    HCT 25.0 2019    MCV 94.0 2019    RDW 13.3 2019     2019       ASSESSMENT & PLAN:      Patient Active Hospital Problem List:   35 weeks gestation of pregnancy (2019)    ppd1 sp - doing well, pain controlled mimla bleeding routine pp care   Acute blood loss anmeia- stable cbc as above

## 2019-05-17 VITALS
HEART RATE: 65 BPM | TEMPERATURE: 98.1 F | BODY MASS INDEX: 23.92 KG/M2 | SYSTOLIC BLOOD PRESSURE: 90 MMHG | WEIGHT: 130 LBS | DIASTOLIC BLOOD PRESSURE: 52 MMHG | RESPIRATION RATE: 16 BRPM | HEIGHT: 62 IN

## 2019-05-17 PROCEDURE — 6370000000 HC RX 637 (ALT 250 FOR IP): Performed by: FAMILY MEDICINE

## 2019-05-17 RX ORDER — IBUPROFEN 800 MG/1
800 TABLET ORAL EVERY 8 HOURS
Qty: 120 TABLET | Refills: 3 | Status: SHIPPED | OUTPATIENT
Start: 2019-05-17 | End: 2022-01-13

## 2019-05-17 RX ORDER — FERROUS SULFATE 325(65) MG
325 TABLET ORAL 2 TIMES DAILY WITH MEALS
Qty: 30 TABLET | Refills: 3 | Status: ON HOLD | OUTPATIENT
Start: 2019-05-17 | End: 2022-02-23 | Stop reason: HOSPADM

## 2019-05-17 RX ADMIN — DOCUSATE SODIUM 100 MG: 100 CAPSULE, LIQUID FILLED ORAL at 08:37

## 2019-05-17 RX ADMIN — IBUPROFEN 800 MG: 800 TABLET ORAL at 08:36

## 2019-05-17 RX ADMIN — FERROUS SULFATE TAB 325 MG (65 MG ELEMENTAL FE) 325 MG: 325 (65 FE) TAB at 08:37

## 2019-05-17 ASSESSMENT — PAIN SCALES - GENERAL: PAINLEVEL_OUTOF10: 9

## 2019-05-17 NOTE — PLAN OF CARE
Problem: Pain:  Goal: Pain level will decrease  Description  Pain level will decrease  Outcome: Met This Shift  Goal: Control of acute pain  Description  Control of acute pain  Outcome: Met This Shift  Goal: Control of chronic pain  Description  Control of chronic pain  Outcome: Met This Shift     Problem: Discharge Planning:  Goal: Discharged to appropriate level of care  Description  Discharged to appropriate level of care  Outcome: Met This Shift     Problem: Constipation:  Goal: Bowel elimination is within specified parameters  Description  Bowel elimination is within specified parameters  Outcome: Met This Shift     Problem: Infection - Risk of, Puerperal Infection:  Goal: Will show no infection signs and symptoms  Description  Will show no infection signs and symptoms  Outcome: Met This Shift     Problem: Mood - Altered:  Goal: Mood stable  Description  Mood stable  Outcome: Met This Shift

## 2019-05-19 LAB — CANNABINOIDS CONF, URINE: >500 NG/ML

## 2019-05-20 NOTE — CARE COORDINATION
SW Discharge Planning     SW noted baby's positive cord stat for THC.  SÁNCHEZ called and notified [facility] , Adam Betts ( [phone])    Electronically signed by ARLENE Mayberry on 5/20/2019 at 3:23 PM

## 2019-06-22 NOTE — DISCHARGE SUMMARY
gallops  Abdomen - soft, nontender, nondistended, no masses or organomegaly  Neurological - alert, oriented, normal speech, no focal findings or movement disorder noted  Musculoskeletal - no joint tenderness, deformity or swelling  Extremities - peripheral pulses normal, no pedal edema, no clubbing or cyanosis  Neg hoans bl  funuds firm beow umbilicus       Comments

## 2020-02-04 ENCOUNTER — HOSPITAL ENCOUNTER (EMERGENCY)
Age: 25
Discharge: HOME | End: 2020-02-04
Payer: COMMERCIAL

## 2020-02-04 ENCOUNTER — APPOINTMENT (OUTPATIENT)
Dept: GENERAL RADIOLOGY | Age: 25
End: 2020-02-04
Payer: COMMERCIAL

## 2020-02-04 VITALS
RESPIRATION RATE: 16 BRPM | SYSTOLIC BLOOD PRESSURE: 142 MMHG | BODY MASS INDEX: 18.53 KG/M2 | TEMPERATURE: 98.1 F | HEIGHT: 62 IN | OXYGEN SATURATION: 100 % | HEART RATE: 106 BPM | DIASTOLIC BLOOD PRESSURE: 86 MMHG | WEIGHT: 100.69 LBS

## 2020-02-04 LAB
HCG, URINE, POC: NEGATIVE
Lab: NORMAL
NEGATIVE QC PASS/FAIL: NORMAL
POSITIVE QC PASS/FAIL: NORMAL

## 2020-02-04 PROCEDURE — 99284 EMERGENCY DEPT VISIT MOD MDM: CPT

## 2020-02-04 PROCEDURE — 6370000000 HC RX 637 (ALT 250 FOR IP): Performed by: PHYSICIAN ASSISTANT

## 2020-02-04 PROCEDURE — 72220 X-RAY EXAM SACRUM TAILBONE: CPT

## 2020-02-04 RX ORDER — TRAMADOL HYDROCHLORIDE 50 MG/1
50 TABLET ORAL EVERY 6 HOURS PRN
Qty: 12 TABLET | Refills: 0 | Status: SHIPPED | OUTPATIENT
Start: 2020-02-04 | End: 2020-02-07

## 2020-02-04 RX ORDER — ACETAMINOPHEN 500 MG
1000 TABLET ORAL 3 TIMES DAILY
Qty: 42 TABLET | Refills: 0 | Status: SHIPPED | OUTPATIENT
Start: 2020-02-04 | End: 2020-12-21 | Stop reason: SDUPTHER

## 2020-02-04 RX ORDER — PREDNISONE 20 MG/1
60 TABLET ORAL ONCE
Status: COMPLETED | OUTPATIENT
Start: 2020-02-04 | End: 2020-02-04

## 2020-02-04 RX ORDER — IBUPROFEN 800 MG/1
800 TABLET ORAL ONCE
Status: COMPLETED | OUTPATIENT
Start: 2020-02-04 | End: 2020-02-04

## 2020-02-04 RX ADMIN — PREDNISONE 60 MG: 20 TABLET ORAL at 13:32

## 2020-02-04 RX ADMIN — IBUPROFEN 800 MG: 800 TABLET, FILM COATED ORAL at 13:32

## 2020-02-04 ASSESSMENT — PAIN SCALES - GENERAL: PAINLEVEL_OUTOF10: 7

## 2020-02-04 NOTE — ED PROVIDER NOTES
[facility]     Department of Emergency Medicine   ED  Provider Note  Admit Date/RoomTime: 2020 12:01 PM  ED Room: [address] [place] [address]    Chief Complaint   Fall (pain to sacral region for 2-1/2 weeks, fell last night and now pain in worse)    History of Present Illness   Source of history provided by:  patient. History/Exam Limitations: none. Anitra Latif is a 30 y.o. old female who has a past medical history of:   Past Medical History:   Diagnosis Date    MDD (major depressive disorder) 2015    STD (female)       presents to the emergency department by private vehicle, for a fall which occured 1 day(s) prior to arrival.  Patient states that she was experiencing pain to the area that started approximately 1.5 weeks earlier, but her pain worsened significantly after she fell yesterday. She was reportedly on her son's hover board when she fell backwards directly onto her backside while at home prior to incident with complaints of pain to her sacral area. Since onset the symptoms have been moderate in degree. Her pain is aggraveated by movement and relieved by massage. She denies any head injury, loss of consciousness, neck pain, chest pain, abdominal pain, extremity injury, numbness or weakness. ROS    Pertinent positives and negatives are stated within HPI, all other systems reviewed and are negative. Past Surgical History:   Procedure Laterality Date    ABDOMEN SURGERY      C SECTION 2013     SECTION       Social History:  reports that she has quit smoking. Her smoking use included cigars. She has a 8.00 pack-year smoking history. She has never used smokeless tobacco. She reports that she does not drink alcohol or use drugs. Family History: family history is not on file.    Allergies: Pcn [penicillins]    Physical Exam   Oxygen Saturation Interpretation: Normal.  ED Triage Vitals   BP Temp Temp Source Pulse Resp SpO2 Height Weight   20 1159 20 1202 20 1202 significant for fracture. Patient received the prescriptions below and has been encouraged not to take NSAIDs. She has been encouraged to buy a doughnut cushion or a wedge cushion to help with weightbearing while she is sitting. Specific conditions for emergent return have been discussed and the patient verbalized understanding to return immediately for new or worsening symptoms. Counseling: The emergency provider has spoken with the patient and discussed todays results, in addition to providing specific details for the plan of care and counseling regarding the diagnosis and prognosis. Questions are answered at this time and they are agreeable with the plan. Assessment      1. Closed fracture of coccyx, initial encounter [facility])       Plan   Discharge to home  Patient condition is good    New Medications     New Prescriptions    ACETAMINOPHEN (TYLENOL) 500 MG TABLET    Take 2 tablets by mouth 3 times daily for 7 days    TRAMADOL (ULTRAM) 50 MG TABLET    Take 1 tablet by mouth every 6 hours as needed for Pain for up to 3 days. Electronically signed by Adonis Strong PA-C   DD: 2/4/20  **This report was transcribed using voice recognition software. Every effort was made to ensure accuracy; however, inadvertent computerized transcription errors may be present.   END OF ED PROVIDER NOTE        Adonis Strong PA-C  02/04/20 [id number]

## 2020-09-21 ENCOUNTER — HOSPITAL ENCOUNTER (EMERGENCY)
Age: 25
Discharge: LEFT BEFORE DISCHARGE | End: 2020-09-21
Attending: EMERGENCY MEDICINE
Payer: COMMERCIAL

## 2020-09-21 ENCOUNTER — APPOINTMENT (OUTPATIENT)
Dept: CT IMAGING | Age: 25
End: 2020-09-21
Payer: COMMERCIAL

## 2020-09-21 ENCOUNTER — APPOINTMENT (OUTPATIENT)
Dept: ULTRASOUND IMAGING | Age: 25
End: 2020-09-21
Payer: COMMERCIAL

## 2020-09-21 VITALS
TEMPERATURE: 97.3 F | HEIGHT: 62 IN | OXYGEN SATURATION: 97 % | BODY MASS INDEX: 20.43 KG/M2 | RESPIRATION RATE: 16 BRPM | DIASTOLIC BLOOD PRESSURE: 77 MMHG | HEART RATE: 73 BPM | WEIGHT: 111 LBS | SYSTOLIC BLOOD PRESSURE: 112 MMHG

## 2020-09-21 LAB
ALBUMIN SERPL-MCNC: 4.7 G/DL (ref 3.5–5.2)
ALP BLD-CCNC: 101 U/L (ref 35–104)
ALT SERPL-CCNC: 14 U/L (ref 0–32)
ANION GAP SERPL CALCULATED.3IONS-SCNC: 13 MMOL/L (ref 7–16)
AST SERPL-CCNC: 19 U/L (ref 0–31)
BACTERIA: ABNORMAL /HPF
BASOPHILS ABSOLUTE: 0.02 E9/L (ref 0–0.2)
BASOPHILS RELATIVE PERCENT: 0.5 % (ref 0–2)
BILIRUB SERPL-MCNC: 0.4 MG/DL (ref 0–1.2)
BILIRUBIN URINE: NEGATIVE
BLOOD, URINE: ABNORMAL
BUN BLDV-MCNC: 8 MG/DL (ref 6–20)
CALCIUM SERPL-MCNC: 9.2 MG/DL (ref 8.6–10.2)
CHLORIDE BLD-SCNC: 99 MMOL/L (ref 98–107)
CLARITY: CLEAR
CO2: 26 MMOL/L (ref 22–29)
COLOR: YELLOW
CREAT SERPL-MCNC: 0.9 MG/DL (ref 0.5–1)
EOSINOPHILS ABSOLUTE: 0.06 E9/L (ref 0.05–0.5)
EOSINOPHILS RELATIVE PERCENT: 1.6 % (ref 0–6)
EPITHELIAL CELLS, UA: ABNORMAL /HPF
GFR AFRICAN AMERICAN: >60
GFR NON-AFRICAN AMERICAN: >60 ML/MIN/1.73
GLUCOSE BLD-MCNC: 92 MG/DL (ref 74–99)
GLUCOSE URINE: NEGATIVE MG/DL
HCG, URINE, POC: NEGATIVE
HCT VFR BLD CALC: 40.6 % (ref 34–48)
HEMOGLOBIN: 13.4 G/DL (ref 11.5–15.5)
IMMATURE GRANULOCYTES #: 0.01 E9/L
IMMATURE GRANULOCYTES %: 0.3 % (ref 0–5)
KETONES, URINE: NEGATIVE MG/DL
LEUKOCYTE ESTERASE, URINE: ABNORMAL
LYMPHOCYTES ABSOLUTE: 1.52 E9/L (ref 1.5–4)
LYMPHOCYTES RELATIVE PERCENT: 40.4 % (ref 20–42)
Lab: NORMAL
MCH RBC QN AUTO: 30.2 PG (ref 26–35)
MCHC RBC AUTO-ENTMCNC: 33 % (ref 32–34.5)
MCV RBC AUTO: 91.4 FL (ref 80–99.9)
MONOCYTES ABSOLUTE: 0.31 E9/L (ref 0.1–0.95)
MONOCYTES RELATIVE PERCENT: 8.2 % (ref 2–12)
NEGATIVE QC PASS/FAIL: NORMAL
NEUTROPHILS ABSOLUTE: 1.84 E9/L (ref 1.8–7.3)
NEUTROPHILS RELATIVE PERCENT: 49 % (ref 43–80)
NITRITE, URINE: NEGATIVE
PDW BLD-RTO: 13.1 FL (ref 11.5–15)
PH UA: 6 (ref 5–9)
PLATELET # BLD: 251 E9/L (ref 130–450)
PMV BLD AUTO: 9.8 FL (ref 7–12)
POSITIVE QC PASS/FAIL: NORMAL
POTASSIUM REFLEX MAGNESIUM: 3.7 MMOL/L (ref 3.5–5)
PROTEIN UA: 30 MG/DL
RBC # BLD: 4.44 E12/L (ref 3.5–5.5)
RBC UA: >20 /HPF (ref 0–2)
SODIUM BLD-SCNC: 138 MMOL/L (ref 132–146)
SPECIFIC GRAVITY UA: 1.02 (ref 1–1.03)
TOTAL PROTEIN: 7.8 G/DL (ref 6.4–8.3)
UROBILINOGEN, URINE: 0.2 E.U./DL
WBC # BLD: 3.8 E9/L (ref 4.5–11.5)
WBC UA: ABNORMAL /HPF (ref 0–5)

## 2020-09-21 PROCEDURE — 99283 EMERGENCY DEPT VISIT LOW MDM: CPT

## 2020-09-21 PROCEDURE — 6360000002 HC RX W HCPCS: Performed by: EMERGENCY MEDICINE

## 2020-09-21 PROCEDURE — 93975 VASCULAR STUDY: CPT

## 2020-09-21 PROCEDURE — 76856 US EXAM PELVIC COMPLETE: CPT

## 2020-09-21 PROCEDURE — 6360000004 HC RX CONTRAST MEDICATION: Performed by: RADIOLOGY

## 2020-09-21 PROCEDURE — 81001 URINALYSIS AUTO W/SCOPE: CPT

## 2020-09-21 PROCEDURE — 80053 COMPREHEN METABOLIC PANEL: CPT

## 2020-09-21 PROCEDURE — 99284 EMERGENCY DEPT VISIT MOD MDM: CPT

## 2020-09-21 PROCEDURE — 85025 COMPLETE CBC W/AUTO DIFF WBC: CPT

## 2020-09-21 PROCEDURE — 74177 CT ABD & PELVIS W/CONTRAST: CPT

## 2020-09-21 PROCEDURE — 96374 THER/PROPH/DIAG INJ IV PUSH: CPT

## 2020-09-21 PROCEDURE — 2580000003 HC RX 258: Performed by: RADIOLOGY

## 2020-09-21 RX ORDER — SODIUM CHLORIDE 0.9 % (FLUSH) 0.9 %
10 SYRINGE (ML) INJECTION ONCE
Status: COMPLETED | OUTPATIENT
Start: 2020-09-21 | End: 2020-09-21

## 2020-09-21 RX ORDER — FENTANYL CITRATE 50 UG/ML
25 INJECTION, SOLUTION INTRAMUSCULAR; INTRAVENOUS ONCE
Status: COMPLETED | OUTPATIENT
Start: 2020-09-21 | End: 2020-09-21

## 2020-09-21 RX ADMIN — FENTANYL CITRATE 25 MCG: 50 INJECTION, SOLUTION INTRAMUSCULAR; INTRAVENOUS at 12:09

## 2020-09-21 RX ADMIN — IOPAMIDOL 110 ML: 755 INJECTION, SOLUTION INTRAVENOUS at 13:01

## 2020-09-21 RX ADMIN — Medication 10 ML: at 13:01

## 2020-09-21 ASSESSMENT — PAIN SCALES - GENERAL
PAINLEVEL_OUTOF10: 10
PAINLEVEL_OUTOF10: 10

## 2020-09-21 ASSESSMENT — PAIN DESCRIPTION - DESCRIPTORS: DESCRIPTORS: CRAMPING

## 2020-09-21 ASSESSMENT — PAIN DESCRIPTION - LOCATION: LOCATION: ABDOMEN

## 2020-09-21 ASSESSMENT — PAIN DESCRIPTION - ORIENTATION: ORIENTATION: RIGHT;LEFT;LOWER

## 2020-09-21 ASSESSMENT — PAIN DESCRIPTION - FREQUENCY: FREQUENCY: INTERMITTENT

## 2020-09-21 NOTE — ED PROVIDER NOTES
HPI:  20,   Time: 10:13 AM EDT         Rickey Patel is a 30 y.o. female presenting to the ED for lower abdominal cramping sensation, beginning yesterday. The complaint has been persistent, mild in severity, and worsened by nothing. Patient states that she started her menstrual cycle yesterday. She states that she is having a cramping sensation in the lower abdomen. Patient states that these cramps are worse than her usual menstrual cramping. She denies a heavier menstrual flow than normal.  Patient states her last menstrual.  Was a month ago. She denies any nausea, vomiting, diarrhea, constipation, dysuria, vaginal discharge. ROS:   Pertinent positives and negatives are stated within HPI, all other systems reviewed and are negative.  --------------------------------------------- PAST HISTORY ---------------------------------------------  Past Medical History:  has a past medical history of MDD (major depressive disorder) and STD (female). Past Surgical History:  has a past surgical history that includes Abdomen surgery and  section (). Social History:  reports that she has quit smoking. Her smoking use included cigars. She has a 8.00 pack-year smoking history. She has never used smokeless tobacco. She reports that she does not drink alcohol or use drugs. Family History: family history is not on file. The patients home medications have been reviewed.     Allergies: Pcn [penicillins]    -------------------------------------------------- RESULTS -------------------------------------------------  All laboratory and radiology results have been personally reviewed by myself   LABS:  Results for orders placed or performed during the hospital encounter of 20   CBC Auto Differential   Result Value Ref Range    WBC 3.8 (L) 4.5 - 11.5 E9/L    RBC 4.44 3.50 - 5.50 E12/L    Hemoglobin 13.4 11.5 - 15.5 g/dL    Hematocrit 40.6 34.0 - 48.0 %    MCV 91.4 80.0 - 99.9 fL    MCH 30.2 26.0 - 35.0 pg    MCHC 33.0 32.0 - 34.5 %    RDW 13.1 11.5 - 15.0 fL    Platelets 070 715 - 930 E9/L    MPV 9.8 7.0 - 12.0 fL    Neutrophils % 49.0 43.0 - 80.0 %    Immature Granulocytes % 0.3 0.0 - 5.0 %    Lymphocytes % 40.4 20.0 - 42.0 %    Monocytes % 8.2 2.0 - 12.0 %    Eosinophils % 1.6 0.0 - 6.0 %    Basophils % 0.5 0.0 - 2.0 %    Neutrophils Absolute 1.84 1.80 - 7.30 E9/L    Immature Granulocytes # 0.01 E9/L    Lymphocytes Absolute 1.52 1.50 - 4.00 E9/L    Monocytes Absolute 0.31 0.10 - 0.95 E9/L    Eosinophils Absolute 0.06 0.05 - 0.50 E9/L    Basophils Absolute 0.02 0.00 - 0.20 E9/L   Urinalysis, reflex to microscopic   Result Value Ref Range    Color, UA Yellow Straw/Yellow    Clarity, UA Clear Clear    Glucose, Ur Negative Negative mg/dL    Bilirubin Urine Negative Negative    Ketones, Urine Negative Negative mg/dL    Specific Gravity, UA 1.020 1.005 - 1.030    Blood, Urine LARGE (A) Negative    pH, UA 6.0 5.0 - 9.0    Protein, UA 30 (A) Negative mg/dL    Urobilinogen, Urine 0.2 <2.0 E.U./dL    Nitrite, Urine Negative Negative    Leukocyte Esterase, Urine TRACE (A) Negative   Comprehensive Metabolic Panel w/ Reflex to MG   Result Value Ref Range    Sodium 138 132 - 146 mmol/L    Potassium reflex Magnesium 3.7 3.5 - 5.0 mmol/L    Chloride 99 98 - 107 mmol/L    CO2 26 22 - 29 mmol/L    Anion Gap 13 7 - 16 mmol/L    Glucose 92 74 - 99 mg/dL    BUN 8 6 - 20 mg/dL    CREATININE 0.9 0.5 - 1.0 mg/dL    GFR Non-African American >60 >=60 mL/min/1.73    GFR African American >60     Calcium 9.2 8.6 - 10.2 mg/dL    Total Protein 7.8 6.4 - 8.3 g/dL    Alb 4.7 3.5 - 5.2 g/dL    Total Bilirubin 0.4 0.0 - 1.2 mg/dL    Alkaline Phosphatase 101 35 - 104 U/L    ALT 14 0 - 32 U/L    AST 19 0 - 31 U/L   Microscopic Urinalysis   Result Value Ref Range    WBC, UA 1-3 0 - 5 /HPF    RBC, UA >20 0 - 2 /HPF    Epithelial Cells, UA RARE /HPF    Bacteria, UA RARE (A) None Seen /HPF   POC Pregnancy Urine Qual   Result Value Ref Range HCG, Urine, POC Negative Negative    Lot Number [id number]     Positive QC Pass/Fail Pass     Negative QC Pass/Fail Pass        RADIOLOGY:  Interpreted by Radiologist.  CT ABDOMEN PELVIS W IV CONTRAST Additional Contrast? None   Final Result   Heterogeneous uterus with engorged tortuous pelvic vessels concerning   for pelvic congestion syndrome. Tiny amount of free fluid is present   in the pelvis. Consider ultrasonography. US PELVIS COMPLETE    (Results Pending)   US DUP ABD PEL RETRO SCROT COMPLETE    (Results Pending)       ------------------------- NURSING NOTES AND VITALS REVIEWED ---------------------------   The nursing notes within the ED encounter and vital signs as below have been reviewed. /77   Pulse 73   Temp 97.3 °F (36.3 °C)   Resp 16   Ht 5' 2\" (1.575 m)   Wt 111 lb (50.3 kg)   SpO2 97%   BMI 20.30 kg/m²   Oxygen Saturation Interpretation: Normal      ---------------------------------------------------PHYSICAL EXAM--------------------------------------      Constitutional/General: Alert and oriented x3, well appearing, non toxic in NAD  Head: NC/AT  Eyes: PERRL, EOMI  Mouth: Oropharynx clear, handling secretions, no trismus  Neck: Supple, full ROM, no meningeal signs  Pulmonary: Lungs clear to auscultation bilaterally, no wheezes, rales, or rhonchi. Not in respiratory distress  Cardiovascular:  Regular rate and rhythm, no murmurs, gallops, or rubs. 2+ distal pulses  Abdomen: Soft, non tender, non distended, left and right lower quadrant tenderness to palpation, suprapubic tenderness to palpation. No rebound or guarding. Extremities: Moves all extremities x 4. Warm and well perfused  Skin: warm and dry without rash  Neurologic: GCS 15, CN 2-12 intact.    Psych: Normal Affect      ------------------------------ ED COURSE/MEDICAL DECISION MAKING----------------------  Medications   fentaNYL (SUBLIMAZE) injection 25 mcg (25 mcg Intravenous Given 9/21/20 8566)   sodium chloride flush 0.9 % injection 10 mL (10 mLs Intravenous Given 9/21/20 1301)   iopamidol (ISOVUE-370) 76 % injection 110 mL (110 mLs Intravenous Given 9/21/20 1301)         Medical Decision Making:    Vital signs are stable. Patient has tenderness to palpation in the lower abdomen. Labs and imaging ordered. Patient eloped after obtaining the ultrasound. Counseling: The emergency provider has spoken with the patient and discussed todays results, in addition to providing specific details for the plan of care and counseling regarding the diagnosis and prognosis. Questions are answered at this time and they are agreeable with the plan.      --------------------------------- IMPRESSION AND DISPOSITION ---------------------------------    IMPRESSION  1.  Lower abdominal pain        DISPOSITION  Disposition: Other Disposition: Eloped  Patient condition is stable                 Lorenza Pollack MD  09/21/20 1818

## 2020-12-21 ENCOUNTER — OFFICE VISIT (OUTPATIENT)
Dept: INTERNAL MEDICINE | Age: 25
End: 2020-12-21
Payer: COMMERCIAL

## 2020-12-21 VITALS
HEART RATE: 98 BPM | WEIGHT: 110 LBS | SYSTOLIC BLOOD PRESSURE: 110 MMHG | HEIGHT: 63 IN | TEMPERATURE: 97.2 F | OXYGEN SATURATION: 100 % | DIASTOLIC BLOOD PRESSURE: 73 MMHG | RESPIRATION RATE: 20 BRPM | BODY MASS INDEX: 19.49 KG/M2

## 2020-12-21 PROCEDURE — G8427 DOCREV CUR MEDS BY ELIG CLIN: HCPCS | Performed by: INTERNAL MEDICINE

## 2020-12-21 PROCEDURE — G8484 FLU IMMUNIZE NO ADMIN: HCPCS | Performed by: INTERNAL MEDICINE

## 2020-12-21 PROCEDURE — 99203 OFFICE O/P NEW LOW 30 MIN: CPT | Performed by: INTERNAL MEDICINE

## 2020-12-21 PROCEDURE — G8420 CALC BMI NORM PARAMETERS: HCPCS | Performed by: INTERNAL MEDICINE

## 2020-12-21 PROCEDURE — 1036F TOBACCO NON-USER: CPT | Performed by: INTERNAL MEDICINE

## 2020-12-21 RX ORDER — ACETAMINOPHEN 500 MG
1000 TABLET ORAL EVERY 6 HOURS PRN
Qty: 42 TABLET | Refills: 0 | Status: SHIPPED | OUTPATIENT
Start: 2020-12-21 | End: 2020-12-28

## 2020-12-21 SDOH — ECONOMIC STABILITY: INCOME INSECURITY: HOW HARD IS IT FOR YOU TO PAY FOR THE VERY BASICS LIKE FOOD, HOUSING, MEDICAL CARE, AND HEATING?: NOT VERY HARD

## 2020-12-21 SDOH — ECONOMIC STABILITY: TRANSPORTATION INSECURITY
IN THE PAST 12 MONTHS, HAS LACK OF TRANSPORTATION KEPT YOU FROM MEETINGS, WORK, OR FROM GETTING THINGS NEEDED FOR DAILY LIVING?: NO

## 2020-12-21 SDOH — ECONOMIC STABILITY: FOOD INSECURITY: WITHIN THE PAST 12 MONTHS, THE FOOD YOU BOUGHT JUST DIDN'T LAST AND YOU DIDN'T HAVE MONEY TO GET MORE.: NEVER TRUE

## 2020-12-21 SDOH — ECONOMIC STABILITY: FOOD INSECURITY: WITHIN THE PAST 12 MONTHS, YOU WORRIED THAT YOUR FOOD WOULD RUN OUT BEFORE YOU GOT MONEY TO BUY MORE.: NEVER TRUE

## 2020-12-21 SDOH — ECONOMIC STABILITY: TRANSPORTATION INSECURITY
IN THE PAST 12 MONTHS, HAS THE LACK OF TRANSPORTATION KEPT YOU FROM MEDICAL APPOINTMENTS OR FROM GETTING MEDICATIONS?: NO

## 2020-12-21 ASSESSMENT — ENCOUNTER SYMPTOMS
BACK PAIN: 1
COUGH: 0
GASTROINTESTINAL NEGATIVE: 1
CHEST TIGHTNESS: 0
WHEEZING: 0
EYES NEGATIVE: 1
SHORTNESS OF BREATH: 0
ALLERGIC/IMMUNOLOGIC NEGATIVE: 1
APNEA: 0

## 2020-12-21 ASSESSMENT — PATIENT HEALTH QUESTIONNAIRE - PHQ9
1. LITTLE INTEREST OR PLEASURE IN DOING THINGS: 0
SUM OF ALL RESPONSES TO PHQ QUESTIONS 1-9: 0
2. FEELING DOWN, DEPRESSED OR HOPELESS: 0
SUM OF ALL RESPONSES TO PHQ9 QUESTIONS 1 & 2: 0
SUM OF ALL RESPONSES TO PHQ QUESTIONS 1-9: 0
SUM OF ALL RESPONSES TO PHQ QUESTIONS 1-9: 0

## 2020-12-21 NOTE — PROGRESS NOTES
[facility]    Attending Physician Statement:  Gil Campo M.D., F.A.C.P. I have discussed the case, including pertinent history and exam findings with the resident. I have seen and examined the patient and the key elements of the encounter have been performed by me. I agree with the assessment, plan and orders as documented by the resident. Patient is seen for new visit today. Last office notes reviewed, relative labs and imaging. Ongoing sacral and lumbar pain sp cal  Seen in ER in FEB for this. She was reportedly on her son's hover board when she fell backwards directly onto her backside while at home prior to incident with complaints of pain to her sacral area    Final Result   Fracture of the distal coccyx.         Likely healing coccyx- followed up chiro for this and xrays done, some healing  But likely ligamentous injury at that time, and paraspinal muscle hypertonicity since then.      sjhe was also seen in ER perimenstral cramps and workup oup on 9/21    All labs reviewed, lymphopenia \"normal\" for AA. Otherwise labs OK +polysubstance noted  Remainder of medical problems as per resident note.

## 2020-12-21 NOTE — PROGRESS NOTES
[facility]  Internal[facility]icine Residency Program  ACC Note      SUBJECTIVE:  CC: had concerns including New Patient (Hx lower back pain at times pain radiates down right leg, Fx tailbone thsi  past February ). Susana Lefort presented to the [facility] for a routine visit. Pt states lower back pain since a incident of fall this Feb. After the fall, she was diagnosed with fracture of coccyx and managed by a chiropractor. However, her back pain persists, and has a newly developed lower thoracic back pain of the last month, and persists, 10/10, exacerbated by change of position. affects her ability to work and/or move, . On exam, muscle strength is 5/5 throughout, sensation intact, ROM intact. Point tenderness of midline and paraspinal of the lumbar sacral and lower back thoracic, no warmness or erythema. Review of Systems   Constitutional: Negative. HENT: Negative. Eyes: Negative. Respiratory: Negative for apnea, cough, chest tightness, shortness of breath and wheezing. Cardiovascular: Negative for chest pain, palpitations and leg swelling. Gastrointestinal: Negative. Endocrine: Negative. Genitourinary: Negative. Musculoskeletal: Positive for back pain. Negative for gait problem, joint swelling, myalgias, neck pain and neck stiffness. Allergic/Immunologic: Negative. Neurological: Negative for dizziness, syncope, facial asymmetry, speech difficulty, numbness and headaches. Hematological: Negative. Psychiatric/Behavioral: Negative.         Outpatient Medications Marked as Taking for the 12/21/20 encounter (Office Visit) with Darcus Simmonds, MD   Medication Sig Dispense Refill    acetaminophen (TYLENOL) 500 MG tablet Take 2 tablets by mouth every 6 hours as needed for Pain 42 tablet 0    ibuprofen (ADVIL;MOTRIN) 800 MG tablet Take 1 tablet by mouth every 8 hours 120 tablet 3       I have reviewed all pertinent PMHx, PSHx, FamHx, SocialHx, medications, and allergiesand updated history as appropriate. OBJECTIVE:    VS: /73   Pulse 98   Temp 97.2 °F (36.2 °C) (Temporal)   Resp 20   Ht 5' 2.75\" (1.594 m)   Wt 110 lb (49.9 kg)   LMP 12/08/2020   SpO2 100% Comment: on room air  BMI 19.64 kg/m²   Physical Exam  Constitutional:       General: She is not in acute distress. Appearance: Normal appearance. She is normal weight. She is not ill-appearing. HENT:      Head: Normocephalic and atraumatic. Nose: Nose normal.      Mouth/Throat:      Mouth: Mucous membranes are moist.   Eyes:      Extraocular Movements: Extraocular movements intact. Pupils: Pupils are equal, round, and reactive to light. Neck:      Musculoskeletal: Normal range of motion and neck supple. Cardiovascular:      Rate and Rhythm: Normal rate and regular rhythm. Heart sounds: No murmur. No friction rub. No gallop. Pulmonary:      Effort: Pulmonary effort is normal. No respiratory distress. Breath sounds: Normal breath sounds. No rhonchi. Abdominal:      General: Abdomen is flat. Bowel sounds are normal. There is no distension. Palpations: Abdomen is soft. Tenderness: There is no abdominal tenderness. Musculoskeletal: Normal range of motion. General: No swelling or tenderness. Comments: muscle strength is 5/5 throughout, sensation intact, ROM intact. Point tenderness of midline and paraspinal of the lumbar sacral and lower back thoracic, no warmness or erythema. Skin:     General: Skin is warm. Capillary Refill: Capillary refill takes less than 2 seconds. Neurological:      General: No focal deficit present. Mental Status: She is alert and oriented to person, place, and time. Cranial Nerves: No cranial nerve deficit. Psychiatric:         Mood and Affect: Mood normal.         Behavior: Behavior normal.         PLAN:  1. Chronic midline low back pain without sciatica    - acetaminophen (TYLENOL) 500 MG tablet;  Take 2 tablets by mouth every 6 hours as needed for Pain  Dispense: 42 tablet; Refill: 0  - Internal Referral To [facility]    2. Chronic midline thoracic back pain    - acetaminophen (TYLENOL) 500 MG tablet; Take 2 tablets by mouth every 6 hours as needed for Pain  Dispense: 42 tablet;  Refill: 0  - Internal Referral To Spine Center        I have reviewed my findings and recommendations with Arvind Decker and Dr Jovanna Cordero MD PGY-1   12/21/2020 9:59 AM

## 2020-12-21 NOTE — PROGRESS NOTES
Discharge instructions reviewed with patient. Patient verbalizes understanding. AVS given. Pt has issue with transportation,[facility] will contact clinic for assistace.

## 2020-12-22 ENCOUNTER — TELEPHONE (OUTPATIENT)
Dept: INTERNAL MEDICINE | Age: 25
End: 2020-12-22

## 2020-12-23 ENCOUNTER — TELEPHONE (OUTPATIENT)
Dept: INTERNAL MEDICINE | Age: 25
End: 2020-12-23

## 2021-01-05 ENCOUNTER — TELEPHONE (OUTPATIENT)
Dept: INTERNAL MEDICINE | Age: 26
End: 2021-01-05

## 2021-01-05 NOTE — TELEPHONE ENCOUNTER
SW attempted to call pt for follow up. SW left message with contact information if pt needed additional linkage with services.

## 2021-01-14 ENCOUNTER — OFFICE VISIT (OUTPATIENT)
Dept: PHYSICAL MEDICINE AND REHAB | Age: 26
End: 2021-01-14
Payer: COMMERCIAL

## 2021-01-14 VITALS
HEIGHT: 62 IN | WEIGHT: 100 LBS | SYSTOLIC BLOOD PRESSURE: 114 MMHG | HEART RATE: 90 BPM | DIASTOLIC BLOOD PRESSURE: 84 MMHG | BODY MASS INDEX: 18.4 KG/M2

## 2021-01-14 DIAGNOSIS — M54.50 CHRONIC BILATERAL LOW BACK PAIN WITHOUT SCIATICA: Primary | ICD-10-CM

## 2021-01-14 DIAGNOSIS — G89.29 CHRONIC BILATERAL LOW BACK PAIN WITHOUT SCIATICA: Primary | ICD-10-CM

## 2021-01-14 PROCEDURE — G8484 FLU IMMUNIZE NO ADMIN: HCPCS | Performed by: PHYSICAL MEDICINE & REHABILITATION

## 2021-01-14 PROCEDURE — 99204 OFFICE O/P NEW MOD 45 MIN: CPT | Performed by: PHYSICAL MEDICINE & REHABILITATION

## 2021-01-14 PROCEDURE — G8419 CALC BMI OUT NRM PARAM NOF/U: HCPCS | Performed by: PHYSICAL MEDICINE & REHABILITATION

## 2021-01-14 PROCEDURE — G8427 DOCREV CUR MEDS BY ELIG CLIN: HCPCS | Performed by: PHYSICAL MEDICINE & REHABILITATION

## 2021-01-14 PROCEDURE — 1036F TOBACCO NON-USER: CPT | Performed by: PHYSICAL MEDICINE & REHABILITATION

## 2021-01-14 NOTE — PROGRESS NOTES
Aissatou Karlatiffanie, [address] [facility] Physical Medicine and Rehabilitation  [address] [facility]. [facility] [place]  Phone: [phone]  Fax: [phone]    PCP: Dali Underwood MD  Date of visit: 1/14/21    Chief Complaint   Patient presents with    Back Pain     broken tailbone after a fall off of a hoverboard       Dear Dr. Noel Serrato,     Thank you for referring your patient to be seen. As you know,  Vic Larose is a 30 y.o. female with past medical history as below who presents with back pain for 11 year(s). There was a sudden onset of pain after falling off hover board. She did had coccyx fracture. She states this pain is better. She not has low back pain. She reports she has fallen several times in the past year. Was seeing a chiropractor and got x-rays and told she had compression fractures. The pain is rated Pain Score:   5, is described as stabbing , and is located in the low back with radiation to the buttocks. The symptoms have been worse since onset. The pain is better with none. The pain is worse with everything. There is no associated numbness/tingling. There is no weakness. There is no bowel/bladder changes. The prior workup has included: Xray sacrum    The prior treatment has included:  PT: none    Chiropractic: just adjustments with no relief.    Modalities: none   OTC Tylenol: none  NSAIDS: advil with no relief   Opioids: none    Membrane stabilizers: none   Muscle relaxers: none    Previous injections: none    Previous surgery at this site: none    Allergies   Allergen Reactions    Pcn [Penicillins] Hives       Current Outpatient Medications   Medication Sig Dispense Refill    ibuprofen (ADVIL;MOTRIN) 800 MG tablet Take 1 tablet by mouth every 8 hours 120 tablet 3    aspirin 81 MG tablet Take 81 mg by mouth daily      acetaminophen (TYLENOL) 500 MG tablet Take 2 tablets by mouth every 6 hours as needed for Pain 42 tablet 0  ferrous sulfate 325 (65 Fe) MG tablet Take 1 tablet by mouth 2 times daily (with meals) (Patient not taking: Reported on 2020) 30 tablet 3    folic acid (FOLVITE) 1 MG tablet take 1 tablet by mouth three times a day  0    Prenatal MV-Min-Fe Fum-FA-DHA (PRENATAL 1 PO) Take by mouth       Current Facility-Administered Medications   Medication Dose Route Frequency Provider Last Rate Last Admin    hydroxyprogesterone caproate oil injection 250 mg  250 mg Intramuscular Q7 Days Lupis Bennett MD   250 mg at 17 1439       Past Medical History:   Diagnosis Date    MDD (major depressive disorder) 2015    STD (female)        Past Surgical History:   Procedure Laterality Date    ABDOMEN SURGERY      C SECTION      SECTION      DILATION AND CURETTAGE OF UTERUS         No family history on file. Social History     Tobacco Use    Smoking status: Former Smoker     Packs/day: 1.00     Years: 8.00     Pack years: 8.00     Types: Cigars    Smokeless tobacco: Never Used   Substance Use Topics    Alcohol use: No     Comment: social    Drug use: No          Functional Status: The patient is able to ambulate and perform activities of daily living without the use of an assistive device. Occupation: The patient is currently unemployed. ROS: For more complete ROS answered by the patient, please see . Constitutional: Denies fevers, chills, night sweats, unintentional weight loss     Skin: Denies rash or skin changes     Eyes: Denies vision changes    Ears/Nose/Throat: Denies nasal congestion or sore throat     Respiratory: Denies SOB or cough     Cardiovascular: Denies CP, palpitations, edema      Gastrointestinal: Denies abdominal pain,  N/V, constipation, or diarrhea    Genitourinary: Denies urinary symptoms    Neurologic: See HPI.     MSK: See HPI.      Psychiatric: Denies sleep disturbance, anxiety, depression Hematologic/Lymphatic/Immunologic: Denies bruising       Physical Exam:   Blood pressure 114/84, pulse 90, height 5' 2\" (1.575 m), weight 100 lb (45.4 kg), not currently breastfeeding. General: well developed and well nourished in no acute distress. Body habitus is thin  HEENT: No rhinorrhea, sneezing, yawning, or lacrimation. No scleral icterus or conjunctival injection. Resp: symmetrical chest expansion, unlabored breathing, respirations unlabored. CV: Heart rate is regular. Peripheral pulses are palpable  Lymph: No visible regional lymphadenopathy. Skin: No rashes or ecchymosis. Normal turgor. Psych: Mood is calm. Affect is normal.   Ext: No edema noted     MSK:   Back/Hip Exam:   Inspection: Pelvis was asymmetric. Lumbar lordotic curvature was decreased. There was no scoliosis. No ecchymoses or erythema. Palpation: Palpatory exam revealed tenderness along lumbosacral paraspinals, ttp midline spine, ttp SI joint sulcus, no ttp greater trochanters and TFL on both side. There was no paraspinal spasms. There were no trigger points. ROM: ROM decreased  Special/provocative testing:   Supine SLR negative   negative FABERS. Neurological Exam:  Strength:   R  L  Hip Flex  5  5  Knee Ext  5  5  Ankle dorsi  5  5  EHL   5 5  Ankle Plantar  5  5    Sensory:  Intact for light touch in all lower extremity dermatomes. Reflexes:   R  L  Patellar  (2+) (2+)  Ankle Jerk  (2+) (2+)      Gait is Normal.      Imaging: (personally reviewed by me 01/14/21)  X-ray sacrum     Impression:   Arsh Mcarthur is a 30 y.o. female     1. Chronic bilateral low back pain without sciatica        Plan:   · Refer to PT   · Obtain lumbar x-ray. ? The patient was educated about the diagnosis, prognosis, indications, risks and benefits of treatment. An opportunity to ask questions was given to the patient and questions were answered. The patient agreed to proceed with the recommended treatment as described above. ? Follow up in 6 weeks. Thank you for the consultation and for allowing me to participate in the care of this patient.         Sincerely,     Lata Christianson DO, [facility]   Board Certified Physical Medicine and Rehabilitation

## 2021-01-19 ENCOUNTER — HOSPITAL ENCOUNTER (OUTPATIENT)
Dept: PHYSICAL THERAPY | Age: 26
Setting detail: THERAPIES SERIES
Discharge: HOME | End: 2021-01-19
Payer: COMMERCIAL

## 2021-01-19 NOTE — PROGRESS NOTES
[address] [facility]                Phone: [phone]  Fax: [phone]    Physical Therapy  Cancellation/No-show Note  Patient Name:  Arsh Mcarthur  :  1995   Date:  2021    For today's appointment patient:  []  Cancelled  []  Rescheduled appointment  [x]  No-show     Reason given by patient:  []  Patient ill  []  Conflicting appointment  []  No transportation    []  Conflict with work  [x]  No reason given  [x]  Other: Pt did not show for scheduled initial evaluation.            Electronically signed by:    Terese Berger DPT  [id number]

## 2021-01-21 ENCOUNTER — HOSPITAL ENCOUNTER (OUTPATIENT)
Dept: PHYSICAL THERAPY | Age: 26
Setting detail: THERAPIES SERIES
Discharge: HOME | End: 2021-01-21
Payer: COMMERCIAL

## 2021-01-21 PROCEDURE — 97161 PT EVAL LOW COMPLEX 20 MIN: CPT | Performed by: PHYSICAL THERAPIST

## 2021-01-21 PROCEDURE — 97110 THERAPEUTIC EXERCISES: CPT | Performed by: PHYSICAL THERAPIST

## 2021-01-21 NOTE — PROGRESS NOTES
Physical Therapy  Initial Assessment  Date: 2021  Patient Name: Tonny Vazquez  MRN: [id number]  : 1995     Subjective   General  Additional Pertinent Hx: Pt reports that she fell 3 times last year. States that she fractured her tailbone the first time she fell. Pt has developed low back pain due to multiple falls. Referring Practitioner: Ender Wang MD  Referral Date : 21  Diagnosis: Chronic low back pain without sciatica  PT Visit Information  PT Insurance Information: [employer]  Subjective  Subjective: Pt reports pain in the central low back in the lower lumbar/sacral region. She reports occasional shooting pain in the LEs. She denies any numbness or tingling. Pt reports poor quality of sleep due to pain. 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, currently unable to work  Additional Comments: Pt has 3 children ages 19 months to 8 yr old    Objective     AROM RLE (degrees)  RLE AROM: WFL  AROM LLE (degrees)  LLE AROM : WFL  Spine  Lumbar: WFL all planes    Strength RLE  Strength RLE: Exception  Comment: 4/5 throughout  Strength LLE  Strength LLE: Exception  Comment: 4/5 throughout  Strength Other  Other: core 4/5     Additional Measures  Special Tests: (-) SLR bilaterally     Assessment   Conditions Requiring Skilled Therapeutic Intervention  Body structures, Functions, Activity limitations: Decreased strength; Increased pain;Decreased endurance  Prognosis: Good  Decision Making: Low Complexity  REQUIRES PT FOLLOW UP: Yes  Activity Tolerance  Activity Tolerance: Patient Tolerated treatment well         Plan   Plan  Times per week: 2x/week  Plan weeks: 4 weeks  Current Treatment Recommendations: Strengthening, ROM, Endurance Training, Manual Therapy - Soft Tissue Mobilization, Pain Management, Modalities, Patient/Caregiver Education & Training, Home Exercise Program    Goals  Long term goals  Time Frame for Long term goals : 4 weeks/8 visits  Long term goal 1: Decrease c/o pain to 3-4/10  Long term goal 2: Increase strength to 4+ to 5/5  Long term goal 3: Pt will report improved quality of sleep due to pain  Long term goal 4: Pt will be independent with HEP for long-term management of symptoms  Patient Goals   Patient goals : To decrease pain       Therapy Time   Individual Concurrent Group Co-treatment   Time In 1010         Time Out 1055         Minutes 45         Timed Code Treatment Minutes: [address] [facility], [address] [facility] 6352  If you have any questions or concerns, please don't hesitate to call.   Thank you for your referral.    Physician Signature:________________________________Date:__________________  By signing above, therapists plan is approved by physician

## 2021-01-21 NOTE — PROGRESS NOTES
[address] [facility]                Phone: [phone]  Fax: [phone]    Physical Therapy Daily Treatment Note  Date:  2021    Patient Name:  Saundra Roberts    :  1995  MRN: [id number]    Restrictions/Precautions:    Diagnosis:  Chronic low back pain without sciatica  Treatment Diagnosis:    Insurance/Certification information:  [facility]  Referring Physician:  Facundo Norman DO  Plan of care signed (Y/N):    Visit# / total visits:    Pain level: 10/10   Time In:  1010  Time Out:  1055    Subjective:  See evaluation    Exercises:  Exercise/Equipment Resistance/Repetitions Other comments     Recumbent stepper 10 minutes       Trunk stretch with ball 20 sec x 3 reps each flex and B rotation       DKTC 5 sec x 10 reps      Piriformis stretch 20 sec x 3 reps B      Lower trunk rotation 20 sec x 3 reps B                                                                                                           Other Therapeutic Activities:  PT evaluation completed    Home Exercise Program:  21 - piriformis stretch, lower trunk rotation, DKTC    Manual Treatments:  N/A    Modalities:  IFC/HP    Timed Code Treatment Minutes:  25    Total Treatment Minutes:  45    Treatment/Activity Tolerance:  [x] Patient tolerated treatment well [] Patient limited by fatigue  [] Patient limited by pain  [] Patient limited by other medical complications  [] Other:     Prognosis: [x] Good [] Fair  [] Poor    Patient Requires Follow-up: [x] Yes  [] No    Plan:   [] Continue per plan of care [] Alter current plan (see comments)  [x] Plan of care initiated [] Hold pending MD visit [] Discharge  Plan for Next Session:        Electronically signed by:  Luiz Lopez, PT [id number]

## 2021-01-26 ENCOUNTER — HOSPITAL ENCOUNTER (OUTPATIENT)
Dept: PHYSICAL THERAPY | Age: 26
Setting detail: THERAPIES SERIES
Discharge: HOME | End: 2021-01-26
Payer: COMMERCIAL

## 2021-01-26 PROCEDURE — G0283 ELEC STIM OTHER THAN WOUND: HCPCS | Performed by: PHYSICAL THERAPIST

## 2021-01-26 PROCEDURE — 97110 THERAPEUTIC EXERCISES: CPT | Performed by: PHYSICAL THERAPIST

## 2021-01-26 NOTE — PROGRESS NOTES
[address] [facility]                Phone: [phone]  Fax: [phone]    Physical Therapy Daily Treatment Note  Date:  2021    Patient Name:  Vic Larose    :  1995  MRN: [id number]    Restrictions/Precautions:    Diagnosis:  Chronic low back pain without sciatica  Treatment Diagnosis:    Insurance/Certification information:  [facility]  Referring Physician:  Aissatou Salazar DO  Plan of care signed (Y/N):    Visit# / total visits:    Pain level: 8/10   Time In:  1100  Time Out:  1150    Subjective:  No new report    Exercises:  Exercise/Equipment Resistance/Repetitions Other comments     Recumbent stepper 10 minutes       Trunk stretch with ball 20 sec x 3 reps each flex and B rotation       DKTC 5 sec x 10 reps      Piriformis stretch 20 sec x 3 reps B      Lower trunk rotation 20 sec x 3 reps B     pelvic tilts  5 sec x 10 reps                                                                                                    Other Therapeutic Activities: N/A    Home Exercise Program:  21 - piriformis stretch, lower trunk rotation, DKTC    Manual Treatments:  N/A    Modalities:  IFC/HP to low back x 15 minutes    Timed Code Treatment Minutes:  35    Total Treatment Minutes:  50    Treatment/Activity Tolerance:  [x] Patient tolerated treatment well [] Patient limited by fatigue  [] Patient limited by pain  [] Patient limited by other medical complications  [x] Other: pain relief reported with estim    Prognosis: [x] Good [] Fair  [] Poor    Patient Requires Follow-up: [x] Yes  [] No    Plan:   [x] Continue per plan of care [] Alter current plan (see comments)  [] Plan of care initiated [] Hold pending MD visit [] Discharge  Plan for Next Session:        Electronically signed by:  Neri Wright, PT [id number]

## 2021-01-28 ENCOUNTER — HOSPITAL ENCOUNTER (OUTPATIENT)
Dept: PHYSICAL THERAPY | Age: 26
Setting detail: THERAPIES SERIES
Discharge: HOME | End: 2021-01-28
Payer: COMMERCIAL

## 2021-01-28 NOTE — PROGRESS NOTES
[address] [facility]                Phone: [phone]  Fax: [phone]    Physical Therapy  Cancellation/No-show Note  Patient Name:  Lucy Caal  :  1995   Date:  2021    For today's appointment patient:  [x]  Cancelled  []  Rescheduled appointment  []  No-show     Reason given by patient:  []  Patient ill  []  Conflicting appointment  []  No transportation    []  Conflict with work  [x]  No reason given  []  Other:     Comments:      Electronically signed by:  Dulce Tejada PT [id number]

## 2021-02-24 NOTE — DISCHARGE SUMMARY
Date:  2/24/2021          Dear Dr. Frank Herron: This is to inform you that, as per [facility] Physical Therapy department policy, your patient Cody Candelaria is as of todays date being discharged from Physical Therapy secondary to the following reasons:    1. If a Physical Therapy outpatient misses three scheduled appointments without any prior notification, he or she will be discharged from physical therapy services. A new prescription will be necessary to resume physical therapy. 2. If a client is absent for 50% of scheduled visits during a one-month period, he or she will be discharged from physical therapy services. A new prescription will be necessary to resume physical therapy. If you have any questions, please feel free to call at [phone]      Thank you          Beena Hdialgo [facility] [address]    [phone]    The information contained in this Fax the designated recipients intend message only for the personal and confidential use named above. This information is to be handled as privileged and confidential.  If the reader of this message is not the intended recipient, you are hereby notified that you have received this document in error and that any review, dissemination, distribution or copying of this message is strictly prohibited. If you receive this communication in error, please notify us immediately at the above number and return the original to us by mail.   Thank you      [address] [place] [address]., [facility]

## 2021-03-05 ENCOUNTER — TELEPHONE (OUTPATIENT)
Dept: INTERNAL MEDICINE | Age: 26
End: 2021-03-05

## 2021-03-05 NOTE — LETTER
[facility]   [address] [place] [address], [address] [place] [address]  Phone: [phone]  Fax: [phone]      3/5/2021  Surekha Ulloa [address]       Dear Nissa Germain: We are sorry you missed your appointment with Dr. Maricel Saldana  on 3/5/2021. Your health and follow-up medical care are important to us. Please call our office as soon as possible at [phone] so that we may reschedule your appointment. Please note that if you have three cancelled appointments or do not show for three consecutive appointments, no medication refills or paperwork requests will be honored until an appointment is scheduled and completed. If you have already rescheduled your appointment, please disregard this letter. We look forward to seeing you soon.        Sincerely,         Deb Self LPN

## 2021-03-05 NOTE — TELEPHONE ENCOUNTER
Called left message informed missed appointment this a.m.  Phone number provided to reschedule Letter was sent

## 2022-01-04 ENCOUNTER — HOSPITAL ENCOUNTER (EMERGENCY)
Age: 27
Discharge: LEFT BEFORE DISCHARGE | End: 2022-01-04

## 2022-01-04 VITALS
BODY MASS INDEX: 20.24 KG/M2 | OXYGEN SATURATION: 98 % | HEIGHT: 62 IN | DIASTOLIC BLOOD PRESSURE: 63 MMHG | WEIGHT: 110 LBS | SYSTOLIC BLOOD PRESSURE: 130 MMHG | TEMPERATURE: 98.1 F | RESPIRATION RATE: 18 BRPM | HEART RATE: 97 BPM

## 2022-01-04 ASSESSMENT — PAIN SCALES - GENERAL: PAINLEVEL_OUTOF10: 8

## 2022-01-04 ASSESSMENT — PAIN DESCRIPTION - LOCATION: LOCATION: ABDOMEN;VAGINA

## 2022-01-04 ASSESSMENT — PAIN DESCRIPTION - DESCRIPTORS: DESCRIPTORS: CRAMPING;ACHING;PRESSURE

## 2022-01-04 ASSESSMENT — PAIN DESCRIPTION - FREQUENCY: FREQUENCY: CONTINUOUS

## 2022-01-04 NOTE — ED NOTES
Bedside US performed by MENDOZA Whitfield at this time. Fetal . Patient tolerated w/o c/o. Patient stating she does not want to stay for any further treatment. Has responsibilities at home. AMA signed.      Valentina Valle RN  01/04/22 0294

## 2022-01-13 ENCOUNTER — ANCILLARY PROCEDURE (OUTPATIENT)
Dept: OBGYN CLINIC | Age: 27
End: 2022-01-13
Payer: COMMERCIAL

## 2022-01-13 ENCOUNTER — INITIAL PRENATAL (OUTPATIENT)
Dept: OBGYN CLINIC | Age: 27
End: 2022-01-13
Payer: COMMERCIAL

## 2022-01-13 VITALS
HEIGHT: 62 IN | SYSTOLIC BLOOD PRESSURE: 102 MMHG | DIASTOLIC BLOOD PRESSURE: 67 MMHG | HEART RATE: 108 BPM | WEIGHT: 112 LBS | BODY MASS INDEX: 20.61 KG/M2

## 2022-01-13 DIAGNOSIS — O98.511 COVID-19 AFFECTING PREGNANCY IN FIRST TRIMESTER: ICD-10-CM

## 2022-01-13 DIAGNOSIS — O34.219 PREVIOUS CESAREAN SECTION COMPLICATING PREGNANCY, ANTEPARTUM CONDITION OR COMPLICATION: ICD-10-CM

## 2022-01-13 DIAGNOSIS — Z3A.15 15 WEEKS GESTATION OF PREGNANCY: ICD-10-CM

## 2022-01-13 DIAGNOSIS — O31.20X0 CONTINUING PREGNANCY AFTER INTRAUTERINE DEATH OF ONE TWIN WITH INTRAUTERINE RETENTION: Primary | ICD-10-CM

## 2022-01-13 DIAGNOSIS — Z64.1 MULTIPARITY: ICD-10-CM

## 2022-01-13 DIAGNOSIS — O30.049 DICHORIONIC DIAMNIOTIC TWIN PREGNANCY, ANTEPARTUM: ICD-10-CM

## 2022-01-13 DIAGNOSIS — O09.899 H/O PRETERM DELIVERY, CURRENTLY PREGNANT: ICD-10-CM

## 2022-01-13 DIAGNOSIS — U07.1 COVID-19 AFFECTING PREGNANCY IN FIRST TRIMESTER: ICD-10-CM

## 2022-01-13 DIAGNOSIS — O09.299 NEONATAL DEATH IN PRIOR PREGNANCY, CURRENTLY PREGNANT: ICD-10-CM

## 2022-01-13 DIAGNOSIS — O26.872 SHORT CERVIX DURING PREGNANCY IN SECOND TRIMESTER: ICD-10-CM

## 2022-01-13 LAB
GLUCOSE URINE, POC: NEGATIVE
PROTEIN UA: POSITIVE

## 2022-01-13 PROCEDURE — 99205 OFFICE O/P NEW HI 60 MIN: CPT | Performed by: OBSTETRICS & GYNECOLOGY

## 2022-01-13 PROCEDURE — 99203 OFFICE O/P NEW LOW 30 MIN: CPT | Performed by: OBSTETRICS & GYNECOLOGY

## 2022-01-13 PROCEDURE — G8420 CALC BMI NORM PARAMETERS: HCPCS | Performed by: OBSTETRICS & GYNECOLOGY

## 2022-01-13 PROCEDURE — 76805 OB US >/= 14 WKS SNGL FETUS: CPT | Performed by: OBSTETRICS & GYNECOLOGY

## 2022-01-13 PROCEDURE — 81002 URINALYSIS NONAUTO W/O SCOPE: CPT | Performed by: OBSTETRICS & GYNECOLOGY

## 2022-01-13 PROCEDURE — 1036F TOBACCO NON-USER: CPT | Performed by: OBSTETRICS & GYNECOLOGY

## 2022-01-13 PROCEDURE — G8484 FLU IMMUNIZE NO ADMIN: HCPCS | Performed by: OBSTETRICS & GYNECOLOGY

## 2022-01-13 PROCEDURE — 76817 TRANSVAGINAL US OBSTETRIC: CPT | Performed by: OBSTETRICS & GYNECOLOGY

## 2022-01-13 PROCEDURE — G8427 DOCREV CUR MEDS BY ELIG CLIN: HCPCS | Performed by: OBSTETRICS & GYNECOLOGY

## 2022-01-13 RX ORDER — ASPIRIN 81 MG/1
162 TABLET ORAL DAILY
Qty: 60 TABLET | Refills: 8 | Status: ON HOLD
Start: 2022-01-13 | End: 2022-02-23 | Stop reason: HOSPADM

## 2022-01-13 RX ORDER — FERROUS SULFATE 325(65) MG
325 TABLET ORAL 2 TIMES DAILY
Qty: 60 TABLET | Refills: 6 | Status: SHIPPED
Start: 2022-01-13 | End: 2022-01-13

## 2022-01-13 NOTE — PROGRESS NOTES
Pt here for initial pregnancy ultrasound  Pt c/o lower pelvic pressure when standing for long periods  Pt denies any cramping/bleeding/or lof

## 2022-01-13 NOTE — PROGRESS NOTES
22    Jcarlos Garduno MD  [facility] for  Phill Gentile     RE:  Kumar Coppola  : 1995   AGE: 27 y.o. This report has been created using voice recognition software. It may contain errors which are inherent in voice recognition technology. Dear Dr. Israel Workman:    Mark Obrien had an appointment today for the following indications:    Patient Active Problem List   Diagnosis    MDD (major depressive disorder)    H/O  delivery, currently pregnant    Previous  section complicating pregnancy, antepartum condition or complication     death in prior pregnancy, currently pregnant    Continuing pregnancy after intrauterine death of one twin with intrauterine retention   [facility] Dichorionic diamniotic twin pregnancy, antepartum    COVID-19 affecting pregnancy in first trimester     Mark Obrien is a 27 y.o. female, who is G6(2,2,1,3). She has an Estimated Date of Delivery: 22 based on her previous ultrasound assessment. She is currently 15 weeks 6 days gestation based on that assessment. She has dichorionic, diamniotic twins with demise of one of the twins at 11 weeks gestational age based on the crown-rump length of the demise baby on 2022. The patient had an early term delivery with her first pregnancy in , delivery at 42 weeks gestational age. She subsequently had a  delivery at 23 weeks 6 days gestational age in  delivered by  section. She subsequently received progesterone with her pregnancy in  and delivered at 42 weeks gestational age. She had a first trimester pregnancy loss in  at 8 weeks gestational age. She subsequently received progesterone in  with a pregnancy delivered at 35 weeks 1 day gestational age secondary to placental abruption.   Based on the patient's history she is a candidate for 17-P injections to begin as soon as possible and continue until 36 weeks gestational age. The patient had COVID-19 during the first trimester of her pregnancy (10/16/2021). She may be at increased risk for  morbidity and mortality including, but not limited to  labor and delivery and stillbirth. There is still limited information regarding the effect of COVID-19 in pregnancy, with available studies sometimes demonstrating conflicting results. It is known that high fevers during pregnancy have been associated with an increased risk for fetal neural tube defects, cleft lip with or without cleft palate, colonic atresia/stenosis, renal agenesis/hypoplasia, limb reduction defect, and gastroschisis. Women who developed COVID-19 during pregnancy are more likely than nonpregnant woman with COVID-19 to need care in intensive care units, to need ventilatory support, and to die from the illness. The risk of overall severe illness and death from pregnant women remains low. Pregnant women with additional health concerns such as obesity, hypertension and gestational diabetes may have a higher risk for severe illness, as seen with nonpregnant women with these compounding variables. Pregnant women of color have a higher rate of illness and death from COVID-19 compared to other pregnant women. This appears to be more related to social, health and economic inequities that put them at greater risk for illness. COVID-19 may be passed to the fetus during pregnancy but this seems to be uncommon as of the current available information. Based on what is known about vaccine for COVID-19 in pregnancy, experts believe that the vaccine appears to be safe and should be offered to pregnant women, particularly those in at risk groups such as healthcare workers and other essential workers. Information regarding the safety of COVID-19 vaccination in pregnancy has been limited thus far. The patient had a  with her second pregnancy at 23 weeks 5 days, followed by 2 VBACS.      A fetal ultrasound evaluation was performed and a detailed report included in the EMR under the image tab. A living intrauterine fetus was identified in the cephalic presentation, with normal fetal heart motion and normal fetal motion noted. There was a second demised fetus with a crown-rump length consistent with 8 weeks 2 days. The placenta was anterior. Dichorionic diamniotic placentation was noted. The amniotic fluid volume was within normal limits and the gestational sac with a living baby. The estimated fetal weight of the living baby was 152 grams. The biometric measurements of the living baby, were consistent with patient's established dating parameters, within the limits of error the test for current gestational age. Visualization of the fetal anatomy was limited secondary to the early gestational age of the living fetus.                       GENETIC SCREENING/TERATOLOGY COUNSELING                  (Includes patient, FTB, and any affected family members)    Patient Age > 35 Years NO   Thalassemia ( MVC<80) NO   Congential Heart Defect NO   Neural Tube Defect NO   Rich-Sachs NO   Sickle Cell Disease NO   Sickle Cell Trait NO   Sickle C Disease or Trait NO   Hemophilia NO   Muscular Dystrophy NO   Cystic Fibrosis NO   Darryn Disease NO   Autism NO   Mental Retardation NO   History of Fragile X NO   Maternal Diabetes NO   Other Genetic Disease or Syndrome NO   Previous Child With Congenital Abnormality Not Listed NO   Recreational Drugs NO                                        INFECTION HISTORY     HEPATITIS IMMUNIZED:  YES   HEPATITIS INFECTION:  NO   EXPOSURE TO TB NO   PARVOVIRUS B-19 NO   CHICKEN POX  NO   MEASLES NO   HIV NO   OTHER RASH OR VIRAL ILLNESS SINCE LMP NO   UTI RECURRENT NO   HPV NO     OB History    Para Term  AB Living   6 4 2 2 1 3   SAB IAB Ectopic Molar Multiple Live Births   1       0 4      # Outcome Date GA Lbr Oziel/2nd Weight Sex Delivery Anes PTL Lv   6 Current 5  05/15/19 35w1d / 00:17 4 lb 10.1 oz (2.1 kg) F Vag-Spont None Y JESSICA      Complications: Abruptio Placenta   4 2018 8w0d          3 Term 17 37w0d  5 lb 11 oz (2.58 kg) M  EPI N JESSICA   2  13 23w6d 02:55 1 lb 10.1 oz (0.74 kg) M CS-LTranv  Y ND      Birth Comments: 3 wk nicu, passed after 3 wks   1 Term 01/31/10 37w0d  5 lb 1 oz (2.296 kg) M Vag-Spont None N JESSICA     PAST GYNECOLOGICAL  HISTORY:  Negative for abnormal pap smears. Positive for sexually transmitted diseases. [address]   Negative for cervical LEEP / conization /cryosurgery. Positive for uterine surgery.  followed by   Negative for ovarian or tubal surgery. Past Medical History:   Diagnosis Date    MDD (major depressive disorder) 2015    STD (female)        Past Surgical History:   Procedure Laterality Date    ABDOMEN SURGERY      C SECTION      SECTION      DILATION AND CURETTAGE      DILATION AND CURETTAGE OF UTERUS         Allergies   Allergen Reactions    Pcn [Penicillins] Hives     Current Outpatient Medications:     aspirin EC 81 MG EC tablet, Take 2 tablets by mouth daily    ferrous sulfate 325 (65 Fe) MG tablet, Take 1 tablet by mouth 2 times daily (with meals)    folic acid (FOLVITE) 1 MG tablet, take 1 tablet by mouth three times a day    Prenatal MV-Min-Fe Fum-FA-DHA (PRENATAL 1 PO), Take by mouth    Social History     Tobacco Use    Smoking status: Former Smoker     Packs/day: 1.00     Years: 8.00     Pack years: 8.00     Types: Cigars     Quit date: 2021     Years since quittin.5    Smokeless tobacco: Never Used   Substance Use Topics    Alcohol use: No     Comment: social       FAMILY MEDICAL HISTORY:   Negative for congenital abnormalities, autism, genetic disease and mental retardation, not listed above.      Review of Systems :   CONSTITUTIONAL : No fever, no chills   HEENT : No headache, no visual changes, no rhinorrhea, no sore throat   CARDIOVASCULAR : No pain, no palpitations, no edema   RESPIRATORY : No pain, no shortness of breath   GASTROINTESTINAL : No N/V, no D/C, no abdominal pain   GENITOURINARY : No dysuria, hematuria and no incontinence   MUSCULOSKELETAL : No myalgia, No back pain  NEUROLOGICAL : No numbness, no tingling, no tremors. No history of seizures  ALL OTHER SYSTEMS WERE REPORTED AS NEGATIVE. PERTINENT PHYSICAL EXAMINATION:   /67   Pulse 108   Ht 5' 2\" (1.575 m)   Wt 112 lb (50.8 kg)   LMP 2021   BMI 20.49 kg/m²   Urine dipstick:   Negative for Glucose    Trace for Albumin    GENERAL:   The patient is a well developed, female who is alert cooperative and oriented times three in no acute distress. HEENT:  Normo cephalic and atraumatic. No facial edema. ABDOMEN:   Her uterus is gravid. She had no complaint of abdominal pain or tenderness. The fetal heart rate is 155 bpm. The fetus is in the cephalic presentation which was confirmed by the ultrasound assessment. EXTREMITIES:  No peripheral edema is noted. PELVIC EXAMINATION:  Transvaginal ultrasound assessment of the cervix was performed. The cervical length was 46 mm, without funneling of the amniotic membranes. IMPRESSION:    1. Dichorionic, diamniotic twin IUP at 15 weeks 6 days with demise of 1 twin and JUDITH: 22    2. Previous delivery at 24 weeks secondary to  labor    3. Previous  delivery at 35 weeks    4. Previous  followed by 2 VBACS    5. Most recent delivery was at 35 weeks 1 day secondary to a placental abruption    6. NIPT results were not available for review 2022    7. Current pregnancy is dichorionic, diamniotic twins with demise of one of the twins at 11 weeks    6. Demise twin is retained     9. Normal cervical length 2022  10.  COVID-19 infection 10/16/2021 in the first trimester of her pregnancy. 11.   Not vaccinated for COVID-19    PLAN:  The patient is to begin 17 days care.    The total time in minutes spent with the patient, reviewing medical records, reviewing imaging studies, performing ultrasonic imaging, reviewing laboratory testing, and documenting information was 45 minutes, of which, 50% of the time was spent in patient education, counseling, and coordinating care with the patient, her provider, and/or her family. Available electronic and paper medical records were reviewed. I discussed the finding of the twin intrauterine gestation with demise of one of the fetuses with the patient's today. She was unaware that she had a twin intrauterine station. I discussed with her her increased risks related to having COVID-19 infection during her pregnancy. We discussed her increased risks associated with her prior history of placental abruption. I advised her that the results of her NIPT assessment may not be reliable secondary to demise of one of her twins at an early gestational age. I discussed with her the option for diagnostic genetic testing including the risks of genetic amniocentesis. Medical, surgical, obstetric, GYN, family, and genetic histories were obtained. I reviewed with the patient her increased risk for  labor and delivery because of her prior history. I discussed with her the recommendation to administer 17-P injections through the balance for pregnancy for prophylaxis for  labor. The patient was familiar with 17 -P, since she had these injections with 2 prior pregnancies following her  delivery. I reviewed with the patient current recommendations that she be vaccinated for COVID-19 secondary to the significant increased risk for COVID-19 infected patients that are pregnant. I advised her that natural immunity from COVID-19 from her prior infection may wane significantly over short period of time, and may not be protective for new variance of the COVID-19 infection.   I discussed with the patient my recommendations for ongoing evaluation and management through the balance of her pregnancy. Requisitions were submitted for approval of 17-P administration to the patient's insurer. I answered all of her questions to her satisfaction. I asked her to call if she had any additional questions prior to her next visit. If you have any questions regarding her management, please contact me at your convenience and thank you for allowing me to participate in her care.     Sincerely,        Violet Curling, MD, MS, Barb Astorga, GEETHACS, RDMS, RVT  Director [address] [place], [place] [phone]

## 2022-01-13 NOTE — PATIENT INSTRUCTIONS
if you are sick, not feeling well or have an infectious process going on please reschedule your appointment by calling [phone]. Also if any family members are not feeling well, please do not bring them to your appointment. We appreciate your cooperation. We are doing this in order to protect our pregnant mothers+ their babies. Call your primary obstetrician with bleeding, leaking of fluid, abdominal tenderness, headache, blurry vision, epigastric pain and increased urinary frequency. If you are experiencing an emergency and need immediate help, call 911 or go to go emergency room or labor and delivery. Please arrive for your scheduled appointment at least 15 minutes early with your actual insurance card+ a photo ID. Also if you need any refills ordered or have questions, it may take up 48 hours to reply. Please allow ample time for your refills. Call me when you use last refill. Thank you for your cooperation. Any questions contact Leyla Cervantes at [phone].

## 2022-02-10 ENCOUNTER — ROUTINE PRENATAL (OUTPATIENT)
Dept: OBGYN CLINIC | Age: 27
End: 2022-02-10
Payer: COMMERCIAL

## 2022-02-10 ENCOUNTER — ANCILLARY PROCEDURE (OUTPATIENT)
Dept: OBGYN CLINIC | Age: 27
End: 2022-02-10
Payer: COMMERCIAL

## 2022-02-10 VITALS
HEART RATE: 88 BPM | WEIGHT: 120.38 LBS | SYSTOLIC BLOOD PRESSURE: 108 MMHG | DIASTOLIC BLOOD PRESSURE: 74 MMHG | BODY MASS INDEX: 22.02 KG/M2

## 2022-02-10 DIAGNOSIS — O30.049 DICHORIONIC DIAMNIOTIC TWIN PREGNANCY, ANTEPARTUM: ICD-10-CM

## 2022-02-10 DIAGNOSIS — Z03.75 SUSPECTED SHORTENING OF CERVIX NOT FOUND: ICD-10-CM

## 2022-02-10 DIAGNOSIS — U07.1 COVID-19 AFFECTING PREGNANCY IN FIRST TRIMESTER: ICD-10-CM

## 2022-02-10 DIAGNOSIS — O34.219 PREVIOUS CESAREAN SECTION COMPLICATING PREGNANCY, ANTEPARTUM CONDITION OR COMPLICATION: ICD-10-CM

## 2022-02-10 DIAGNOSIS — O98.511 COVID-19 AFFECTING PREGNANCY IN FIRST TRIMESTER: ICD-10-CM

## 2022-02-10 DIAGNOSIS — O34.32 CERVICAL FUNNELING AFFECTING PREGNANCY IN SECOND TRIMESTER: ICD-10-CM

## 2022-02-10 DIAGNOSIS — O09.899 H/O PRETERM DELIVERY, CURRENTLY PREGNANT: ICD-10-CM

## 2022-02-10 DIAGNOSIS — O26.872 SHORT CERVIX DURING PREGNANCY IN SECOND TRIMESTER: ICD-10-CM

## 2022-02-10 DIAGNOSIS — O31.20X0 CONTINUING PREGNANCY AFTER INTRAUTERINE DEATH OF ONE TWIN WITH INTRAUTERINE RETENTION: Primary | ICD-10-CM

## 2022-02-10 DIAGNOSIS — Z3A.19 19 WEEKS GESTATION OF PREGNANCY: ICD-10-CM

## 2022-02-10 DIAGNOSIS — O09.299 NEONATAL DEATH IN PRIOR PREGNANCY, CURRENTLY PREGNANT: ICD-10-CM

## 2022-02-10 LAB
GLUCOSE URINE, POC: NEGATIVE
PROTEIN UA: POSITIVE

## 2022-02-10 PROCEDURE — 81002 URINALYSIS NONAUTO W/O SCOPE: CPT | Performed by: OBSTETRICS & GYNECOLOGY

## 2022-02-10 PROCEDURE — 99213 OFFICE O/P EST LOW 20 MIN: CPT | Performed by: OBSTETRICS & GYNECOLOGY

## 2022-02-10 PROCEDURE — 76805 OB US >/= 14 WKS SNGL FETUS: CPT | Performed by: OBSTETRICS & GYNECOLOGY

## 2022-02-10 PROCEDURE — G8427 DOCREV CUR MEDS BY ELIG CLIN: HCPCS | Performed by: OBSTETRICS & GYNECOLOGY

## 2022-02-10 PROCEDURE — G8484 FLU IMMUNIZE NO ADMIN: HCPCS | Performed by: OBSTETRICS & GYNECOLOGY

## 2022-02-10 PROCEDURE — 76817 TRANSVAGINAL US OBSTETRIC: CPT | Performed by: OBSTETRICS & GYNECOLOGY

## 2022-02-10 PROCEDURE — G8420 CALC BMI NORM PARAMETERS: HCPCS | Performed by: OBSTETRICS & GYNECOLOGY

## 2022-02-10 PROCEDURE — 1036F TOBACCO NON-USER: CPT | Performed by: OBSTETRICS & GYNECOLOGY

## 2022-02-10 NOTE — PATIENT INSTRUCTIONS
Patient Education        Learning About When to Call Your Doctor During Pregnancy (Up to 20 Weeks)  Overview     It's common to have concerns about what might be a problem during your pregnancy. Most pregnancies don't have any serious problems. But it's still important to know when to call your doctor if you have certain symptoms. These are general suggestions. Your doctor may give you some more information about when to call. When to call your doctor (up to 20 weeks)  Call 911 anytime you think you may need emergency care. For example, call if:  · You passed out (lost consciousness). Call your doctor now or seek immediate medical care if:  · You have a fever. · You have vaginal bleeding. · You are dizzy or lightheaded, or you feel like you may faint. · You have symptoms of a urinary tract infection. These may include:  ? Pain or burning when you urinate. ? A frequent need to urinate without being able to pass much urine. ? Pain in the flank, which is just below the rib cage and above the waist on either side of the back. ? Blood in your urine. · You have belly pain. · You think you are having contractions. · You have a sudden release of fluid from your vagina. Watch closely for changes in your health, and be sure to contact your doctor if:  · You have vaginal discharge that smells bad. · You have other concerns about your pregnancy. Follow-up care is a key part of your treatment and safety. Be sure to make and go to all appointments, and call your doctor if you are having problems. It's also a good idea to know your test results and keep a list of the medicines you take. Where can you learn more? Go to [email or web address] org and sign in to your [employer] account. Enter I757 in the [facility] box to learn more about \"Learning About When to Call Your Doctor During Pregnancy (Up to 20 Weeks). \"     If you do not have an account, please click on the \"Sign Up Now\" link.  Current as of: June 16, 2021               Content Version: 13.1  © 4113-2559 [employer], [employer]. Care instructions adapted under license by [facility] ([facility]). If you have questions about a medical condition or this instruction, always ask your healthcare professional. [address] any warranty or liability for your use of this information. Please arrive for your scheduled appointment at least 15 minutes early with your actual insurance card+ a photo ID. Also if you need any refills ordered or have questions, it may take up 48 hours to reply. Please allow ample time for your refills. Call me when you use last refill. Thank you for your cooperation. Call your primary obstetrician with bleeding, leaking of fluid, abdominal tenderness, headache, blurry vision, epigastric pain and increased urinary frequency. If you are experiencing an emergency and need immediate help, call 911 or go to go emergency room or labor and delivery. Do kick counts after dinner. Call your primary obstetrician if less than 10 kicks in 2 hours after dinner. Call your primary obstetrician with bleeding, leaking of fluid, abdominal tenderness, headache, blurry vision, epigastric pain and increased urinary frequency. if you are sick, not feeling well or have an infectious process going on please reschedule your appointment by calling [phone]. Also if any family members are not feeling well, please do not bring them to your appointment. We appreciate your cooperation. We are doing this in order to protect our pregnant mothers+ their babies. if you are sick, not feeling well or have an infectious process going on please reschedule your appointment by calling [phone]. Also if any family members are not feeling well, please do not bring them to your appointment. We appreciate your cooperation. We are doing this in order to protect our pregnant mothers+ their babies.

## 2022-02-10 NOTE — PROGRESS NOTES
2/10/22    Terrell Torres MD  [address] [facility] for  Phill Gentile     RE:  Yennifer Roberts  : 1995   AGE: 27 y.o. This report has been created using voice recognition software. It may contain errors which are inherent in voice recognition technology. Dear Dr. Kaylyn Blanton:    Mandi Barkley had an appointment today for the following indications:    Patient Active Problem List   Diagnosis    MDD (major depressive disorder)    H/O  delivery, currently pregnant    Previous  section complicating pregnancy, antepartum condition or complication     death in prior pregnancy, currently pregnant    Continuing pregnancy after intrauterine death of one twin with intrauterine retention   Marija Larger Dichorionic diamniotic twin pregnancy, antepartum    COVID-19 affecting pregnancy in first trimester     Mandi Barkley is a 27 y.o. female, who is G6(2,2,1,3). She has an Estimated Date of Delivery: 22 based on her previous ultrasound assessment. She is currently 19 weeks 6 days gestation based on that assessment. She has dichorionic, diamniotic twins with demise of one of the twins at 11 weeks gestational age based on the crown-rump length of the demise baby on 2022. The patient had no complaint of abdominal pain or tenderness on 2/10/2022. She has had no vaginal bleeding or leaking of amniotic fluid. The patient had an early term delivery with her first pregnancy in , delivery at 42 weeks gestational age. She subsequently had a  delivery at 23 weeks 6 days gestational age in  delivered by  section. She received progesterone with her pregnancy in , and delivered at 42 weeks gestational age. She had a first trimester pregnancy loss in  at 8 weeks gestational age. She then received progesterone in  with a pregnancy delivered at 35 weeks 1 day gestational age secondary to placental abruption.       Based on the patient's history she is a candidate for 17-P injections to be continue until 39 weeks gestational age, unless she has a clinical indication to discontinue the medication prior to that time. The patient had a  with her second pregnancy at 23 weeks 5 days, followed by 2 VBACS. The patient had COVID-19 during the first trimester of her pregnancy (10/16/2021). She may be at increased risk for  morbidity and mortality including, but not limited to  labor and delivery and stillbirth. There is still limited information regarding the effect of COVID-19 in pregnancy, with available studies sometimes demonstrating conflicting results. It is known that high fevers during pregnancy have been associated with an increased risk for fetal neural tube defects, cleft lip with or without cleft palate, colonic atresia/stenosis, renal agenesis/hypoplasia, limb reduction defect, and gastroschisis. Women who developed COVID-19 during pregnancy are more likely than nonpregnant woman with COVID-19 to need care in intensive care units, to need ventilatory support, and to die from the illness. The risk of overall severe illness and death from pregnant women remains low. Pregnant women with additional health concerns such as obesity, hypertension and gestational diabetes may have a higher risk for severe illness, as seen with nonpregnant women with these compounding variables. Pregnant women of color have a higher rate of illness and death from COVID-19 compared to other pregnant women. This appears to be more related to social, health and economic inequities that put them at greater risk for illness. COVID-19 may be passed to the fetus during pregnancy but this seems to be uncommon as of the current available information.   Based on what is known about vaccine for COVID-19 in pregnancy, experts believe that the vaccine appears to be safe and should be offered to pregnant women, particularly those in at risk groups such as healthcare workers and other essential workers. Information regarding the safety of COVID-19 vaccination in pregnancy has been limited thus far. A fetal ultrasound evaluation was performed on 1/13/2022. A detailed report included in the EMR under the imaging tab. A living intrauterine fetus was identified in the cephalic presentation, with normal fetal heart motion and normal fetal motion noted. There was a second demised fetus with a crown-rump length consistent with 8 weeks 2 days. The placenta was anterior. Dichorionic diamniotic placentation was noted. The amniotic fluid volume was within normal limits and the gestational sac with a living baby. The estimated fetal weight of the living baby was 152 grams. The biometric measurements of the living baby, were consistent with patient's established dating parameters, within the limits of error the test for current gestational age. Visualization of the fetal anatomy was somewhat limited secondary to the fetal position. There were no apparent gross fetal anatomic abnormalities noted in the areas which were visualized. A fetal ultrasound evaluation was performed on 2/10/2020 to a detailed report included in the EMR under the imaging tab. A living garibay intrauterine fetus was identified in the cephalic presentation, with normal fetal heart motion and normal fetal motion noted. The amniotic fluid index is 14.2 cm. The placenta was on the anterior and fundal surfaces of the uterus. The biometric measurements were consistent with the patient's established dating parameters, within the limits of error the test at the current gestational age. The estimated fetal weight was 293 g. No apparent gross fetal anatomic abnormalities were identified in the areas which were visualized.                       GENETIC SCREENING/TERATOLOGY COUNSELING                  (Includes patient, FTB, and any affected family members)    Patient Age > 35 Years NO   Thalassemia ( MVC<80) NO   Congential Heart Defect NO   Neural Tube Defect NO   Rich-Sachs NO   Sickle Cell Disease NO   Sickle Cell Trait NO   Sickle C Disease or Trait NO   Hemophilia NO   Muscular Dystrophy NO   Cystic Fibrosis NO   Greenlee Disease NO   Autism NO   Mental Retardation NO   History of Fragile X NO   Maternal Diabetes NO   Other Genetic Disease or Syndrome NO   Previous Child With Congenital Abnormality Not Listed NO   Recreational Drugs NO                                        INFECTION HISTORY     HEPATITIS IMMUNIZED:  YES   HEPATITIS INFECTION:  NO   EXPOSURE TO TB NO   PARVOVIRUS B-19 NO   CHICKEN POX  NO   MEASLES NO   HIV NO   OTHER RASH OR VIRAL ILLNESS SINCE LMP NO   UTI RECURRENT NO   HPV NO     OB History    Para Term  AB Living   6 4 2 2 1 3   SAB IAB Ectopic Molar Multiple Live Births   1       0 4      # Outcome Date GA Lbr Oziel/2nd Weight Sex Delivery Anes PTL Lv   6 Current            5  05/15/19 35w1d / 00:17 4 lb 10.1 oz (2.1 kg) F Vag-Spont None Y JESSICA      Complications: Abruptio Placenta   4 SAB  8w0d          3 Term 17 37w0d  5 lb 11 oz (2.58 kg) M  EPI N JESSICA   2  13 23w6d 02:55 1 lb 10.1 oz (0.74 kg) M CS-LTranv  Y ND      Birth Comments: 3 wk nicu, passed after 3 wks   1 Term 01/31/10 37w0d  5 lb 1 oz (2.296 kg) M Vag-Spont None N JESSICA     PAST GYNECOLOGICAL  HISTORY:  Negative for abnormal pap smears. Positive for sexually transmitted diseases. [address]   Negative for cervical LEEP / conization /cryosurgery. Positive for uterine surgery.  followed by   Negative for ovarian or tubal surgery.      Past Medical History:   Diagnosis Date    MDD (major depressive disorder) 2015    STD (female)        Past Surgical History:   Procedure Laterality Date    ABDOMEN SURGERY      C SECTION      SECTION      DILATION AND CURETTAGE      DILATION AND CURETTAGE OF UTERUS   Allergies   Allergen Reactions    Pcn [Penicillins] Hives     Current Outpatient Medications:     aspirin EC 81 MG EC tablet, Take 2 tablets by mouth daily    ferrous sulfate 325 (65 Fe) MG tablet, Take 1 tablet by mouth 2 times daily (with meals)    folic acid (FOLVITE) 1 MG tablet, take 1 tablet by mouth three times a day    Prenatal MV-Min-Fe Fum-FA-DHA (PRENATAL 1 PO), Take by mouth    Social History     Tobacco Use    Smoking status: Former Smoker     Packs/day: 1.00     Years: 8.00     Pack years: 8.00     Types: Cigars     Quit date: 2021     Years since quittin.6    Smokeless tobacco: Never Used   Substance Use Topics    Alcohol use: No     Comment: social       FAMILY MEDICAL HISTORY:   Negative for congenital abnormalities, autism, genetic disease and mental retardation, not listed above. Review of Systems :   CONSTITUTIONAL : No fever, no chills   HEENT : No headache, no visual changes, no rhinorrhea, no sore throat   CARDIOVASCULAR : No pain, no palpitations, no edema   RESPIRATORY : No pain, no shortness of breath   GASTROINTESTINAL : No N/V, no D/C, no abdominal pain   GENITOURINARY : No dysuria, hematuria and no incontinence   MUSCULOSKELETAL : No myalgia, No back pain  NEUROLOGICAL : No numbness, no tingling, no tremors. No history of seizures  ALL OTHER SYSTEMS WERE REPORTED AS NEGATIVE. PERTINENT PHYSICAL EXAMINATION:   /74   Pulse 88   Wt 120 lb 6 oz (54.6 kg)   LMP 2021   BMI 22.02 kg/m²   Urine dipstick:   Negative for Glucose    Trace for Albumin    GENERAL:   The patient is a well developed, female who is alert cooperative and oriented times three in no acute distress. HEENT:  Normo cephalic and atraumatic. No facial edema. ABDOMEN:   Her uterus is gravid. She had no complaint of abdominal pain or tenderness.  The fetal heart rate is 155 bpm. The fetus was in the cephalic presentation which was confirmed by the ultrasound assessment. EXTREMITIES:  No peripheral edema is noted. PELVIC EXAMINATION:  Transvaginal ultrasound assessment of the cervix was performed. The cervical length was 19 mm, with an irregularly shaped follow-up in the endocervical canal.  The anterior uterine wall appeared to be thin, however, an artifact could not be ruled out since the bladder was empty. IMPRESSION:    1. Dichorionic, diamniotic twin IUP at 19 weeks 6 days with demise of 1 twin and JUDITH: 22    2. Previous delivery at 24 weeks secondary to  labor    3. Previous  delivery at 35 weeks    4. Previous  followed by 2 VBACS    5. Most recent delivery was at 35 weeks 1 day secondary to a placental abruption    6. NIPT results were not available for review 2022    7. Current pregnancy is dichorionic, diamniotic twins with demise of one of the twins at 11 weeks    6. Demise twin is retained     9. Short cervical length with funneling of the amniotic membranes on 2/10/2022  10.  COVID-19 infection 10/16/2021 in the first trimester of her pregnancy. 11. Not vaccinated for COVID-19  12. Scheduled for 17-P injection on 2022 at 10 AM at her home. PLAN:  The patient is to continue to receive 17-P injections until 39 weeks gestational age, unless there is a clinical indication to discontinue the medication prior to that time. I recommended that the patient return for follow-up assessment on 2022 to confirm the cervical length and reassess the anterior uterine wall when the patient's bladder is full. I advised the patient that the [employer] of Obstetrics and Gynecology and The Society for Maternal Fetal Medicine recommend that all pregnant women be vaccinated for COVID-19.  \"Data have shown that COVID-19 infection puts pregnant people at increased risk of severe complications and even death; yet only about 22% of pregnant individuals have received 1 more doses of COVID-19 vaccine according to the [facility] for Disease Control and Prevention. \"    \" Recent data have shown that more than 95% of those were hospitalized and/or dying from COVID-19 are those who have remained unvaccinated. Pregnant individuals who have decided to wait until after delivery to be vaccinated may be inadvertently exposing themselves to an increased risk of severe illness or death. \"     \" COVID-19 vaccination is the best testing to reduce maternal and fetal complications of EFJJH-77 infection among pregnant people,\" said Adan Guillen MD, president of the Society for Maternal Fetal Medicine, sub-specialists. The patient was advised to call if she has any increased vaginal discharge, vaginal bleeding, contractions, abdominal pain, back pain or any new significant symptomatology prior to her next visit. I advised her that these are signs and symptoms of cervical change and require follow-up assessment when they occur. The patient was advised to remain sexually abstinent through the balance of her pregnancy. I requested the patient return for a follow-up assessment on 2/11/2022, to repeat the ultrasound assessment, unless there is a clinical reason for her to return prior to that time. She is to call if she has any problems or questions prior to her next visit. Further evaluation and management will be dependent on her clinical presentation and the results of her testing. The patient is to continue to follow with you in your office for ongoing obstetric care. The total time in minutes spent with the patient, reviewing medical records, reviewing imaging studies, performing ultrasonic imaging, reviewing laboratory testing, and documenting information was 22 minutes, of which, 50% of the time was spent in patient education, counseling, and coordinating care with the patient, her provider, and/or her family. Available electronic and paper medical records were reviewed.   I discussed the finding of the twin intrauterine gestation with demise of one of the fetuses with the patient's today. I discussed with her her increased risks related to having COVID-19 infection during her pregnancy. We discussed her increased risks associated with her prior history of placental abruption. I advised her that the results of her NIPT assessment may not be reliable secondary to demise of one of her twins at an early gestational age. I discussed with her the recommendation to administer 17-P injections through the balance for pregnancy for prophylaxis for  labor. The patient was familiar with 17 -P, since she had these injections with 2 prior pregnancies following her  delivery. I reviewed with the patient current recommendations that she be vaccinated for COVID-19 secondary to the significant increased risk for COVID-19 infected patients that are pregnant. I advised her that natural immunity from COVID-19 from her prior infection may wane significantly over short period of time, and may not be protective for new variance of the COVID-19 infection. I discussed with the patient the results of her ultrasound assessment performed today, including the finding of a short cervix with funneling of the amniotic membranes noted on transvaginal ultrasound assessment. I answered all of her questions to her satisfaction. I asked her to call if she had any additional questions prior to her next visit. If you have any questions regarding her management, please contact me at your convenience and thank you for allowing me to participate in her care.     Sincerely,        Pili Dickerson MD, MS, Edison Montgomery, ЕКАТЕРИНА, RDMS, RVT  Director [address] [place] [address]  [phone]

## 2022-02-10 NOTE — PROGRESS NOTES
Pt here for pregnancy ultrasound  Pt denies any cramping/bleeding/leaking of fluid  Pt states feeling fluttering for movement

## 2022-02-11 ENCOUNTER — ROUTINE PRENATAL (OUTPATIENT)
Dept: OBGYN CLINIC | Age: 27
End: 2022-02-11
Payer: COMMERCIAL

## 2022-02-11 ENCOUNTER — ANCILLARY PROCEDURE (OUTPATIENT)
Dept: OBGYN CLINIC | Age: 27
End: 2022-02-11
Payer: COMMERCIAL

## 2022-02-11 VITALS
SYSTOLIC BLOOD PRESSURE: 105 MMHG | DIASTOLIC BLOOD PRESSURE: 71 MMHG | HEART RATE: 128 BPM | BODY MASS INDEX: 21.31 KG/M2 | WEIGHT: 116.5 LBS

## 2022-02-11 DIAGNOSIS — O34.219 PREVIOUS CESAREAN SECTION COMPLICATING PREGNANCY, ANTEPARTUM CONDITION OR COMPLICATION: ICD-10-CM

## 2022-02-11 DIAGNOSIS — O31.20X0 CONTINUING PREGNANCY AFTER INTRAUTERINE DEATH OF ONE TWIN WITH INTRAUTERINE RETENTION: ICD-10-CM

## 2022-02-11 DIAGNOSIS — O09.899 H/O PRETERM DELIVERY, CURRENTLY PREGNANT: ICD-10-CM

## 2022-02-11 DIAGNOSIS — O34.32 CERVICAL FUNNELING AFFECTING PREGNANCY IN SECOND TRIMESTER: ICD-10-CM

## 2022-02-11 DIAGNOSIS — O98.511 COVID-19 AFFECTING PREGNANCY IN FIRST TRIMESTER: ICD-10-CM

## 2022-02-11 DIAGNOSIS — O30.049 DICHORIONIC DIAMNIOTIC TWIN PREGNANCY, ANTEPARTUM: ICD-10-CM

## 2022-02-11 DIAGNOSIS — U07.1 COVID-19 AFFECTING PREGNANCY IN FIRST TRIMESTER: ICD-10-CM

## 2022-02-11 DIAGNOSIS — O26.872 SHORT CERVIX DURING PREGNANCY IN SECOND TRIMESTER: Primary | ICD-10-CM

## 2022-02-11 DIAGNOSIS — O09.299 NEONATAL DEATH IN PRIOR PREGNANCY, CURRENTLY PREGNANT: ICD-10-CM

## 2022-02-11 PROCEDURE — G8427 DOCREV CUR MEDS BY ELIG CLIN: HCPCS | Performed by: OBSTETRICS & GYNECOLOGY

## 2022-02-11 PROCEDURE — G8484 FLU IMMUNIZE NO ADMIN: HCPCS | Performed by: OBSTETRICS & GYNECOLOGY

## 2022-02-11 PROCEDURE — 76817 TRANSVAGINAL US OBSTETRIC: CPT | Performed by: OBSTETRICS & GYNECOLOGY

## 2022-02-11 PROCEDURE — 1036F TOBACCO NON-USER: CPT | Performed by: OBSTETRICS & GYNECOLOGY

## 2022-02-11 PROCEDURE — G8420 CALC BMI NORM PARAMETERS: HCPCS | Performed by: OBSTETRICS & GYNECOLOGY

## 2022-02-11 PROCEDURE — 76815 OB US LIMITED FETUS(S): CPT | Performed by: OBSTETRICS & GYNECOLOGY

## 2022-02-11 PROCEDURE — 99212 OFFICE O/P EST SF 10 MIN: CPT | Performed by: OBSTETRICS & GYNECOLOGY

## 2022-02-11 PROCEDURE — 99213 OFFICE O/P EST LOW 20 MIN: CPT | Performed by: OBSTETRICS & GYNECOLOGY

## 2022-02-11 RX ORDER — PROGESTERONE 200 MG/1
200 CAPSULE ORAL NIGHTLY
Qty: 30 CAPSULE | Refills: 3 | Status: ON HOLD
Start: 2022-02-11 | End: 2022-02-23 | Stop reason: HOSPADM

## 2022-02-11 NOTE — PATIENT INSTRUCTIONS
Patient Education        Weeks 18 to 22 of Your Pregnancy: Care Instructions  Overview     Your baby is continuing to develop quickly. Sometime between 18 and 22 weeks, you'll probably start to feel your baby move. At first, these small fetal movements feel like fluttering or \"butterflies. \" Or they may feel like gas bubbles. As your baby grows, these movements will become stronger. You may also notice that your baby hiccups. Babies at this stage can now suck their thumbs. You may find that your nausea and fatigue are gone. You may feel better overall and have more energy than you did in your first trimester. But you might now also have some new discomforts, like sleep problems or leg cramps. Talk to your doctor about things you can do at home to ease these problems. Follow-up care is a key part of your treatment and safety. Be sure to make and go to all appointments, and call your doctor if you are having problems. It's also a good idea to know your test results and keep a list of the medicines you take. How can you care for yourself at home? Ease sleep problems  · Avoid caffeine in drinks or chocolate late in the day. · Get some exercise every day. · Take a warm shower or bath before bed. · Have a light snack or glass of milk at bedtime. · Do relaxation exercises in bed to calm your mind and body. · Support your legs and back with extra pillows. Try a pillow between your legs if you sleep on your side. · Do not use sleeping pills or alcohol. They could harm your baby. Ease leg cramps  · Do not massage your calf during the cramp. · Sit on a firm bed or chair. Straighten your leg, and bend your foot (flex your ankle) slowly upward, toward your knee. Bend your toes up and down. · Stand on a cool, flat surface. Stretch your toes upward, and take small steps walking on your heels. · Use a heating pad or hot water bottle to help with muscle ache. Prevent leg cramps  · Be sure to get enough calcium.  If you are worried that you are not getting enough, talk to your doctor. · Exercise every day, and stretch your legs before bed. · Take a warm bath before bed, and try leg warmers at night. Where can you learn more? Go to https://tavia.[email or web address] org and sign in to your [employer] account. Enter B754 in the [facility] box to learn more about \"Weeks 18 to 22 of Your Pregnancy: Care Instructions. \"     If you do not have an account, please click on the \"Sign Up Now\" link. Current as of: June 16, 2021               Content Version: 13.1  © 3347-3328 [employer]. Care instructions adapted under license by [facility] ([facility]). If you have questions about a medical condition or this instruction, always ask your healthcare professional. [place] [address] any warranty or liability for your use of this information. Patient Education        Learning About When to Call Your Doctor During Pregnancy (After 20 Weeks)  Overview  It's common to have concerns about what might be a problem when you're pregnant. Most pregnancies don't have any serious problems. But it's still important to know when to call your doctor if you have certain symptoms or signs of labor. These are general suggestions. Your doctor may give you some more information about when to call. When to call your doctor (after 20 weeks)  Call 911  anytime you think you may need emergency care. For example, call if:  · You have severe vaginal bleeding. · You have sudden, severe pain in your belly. · You passed out (lost consciousness). · You have a seizure. · You see or feel the umbilical cord. · You think you are about to deliver your baby and can't make it safely to the hospital.  Call your doctor now or seek immediate medical care if:  · You have vaginal bleeding. · You have belly pain. · You have a fever.   · You have symptoms of preeclampsia, such as:  ? Sudden swelling of your face, hands, or feet.  ? New vision problems (such as dimness, blurring, or seeing spots). ? A severe headache. · You have a sudden release of fluid from your vagina. (You think your water broke.)  · You think that you may be in labor. This means that you've had at least 6 contractions in an hour. · You notice that your baby has stopped moving or is moving much less than normal.  · You have symptoms of a urinary tract infection. These may include:  ? Pain or burning when you urinate. ? A frequent need to urinate without being able to pass much urine. ? Pain in the flank, which is just below the rib cage and above the waist on either side of the back. ? Blood in your urine. Watch closely for changes in your health, and be sure to contact your doctor if:  · You have vaginal discharge that smells bad. · You have skin changes, such as:  ? A rash. ? Itching. ? Yellow color to your skin. · You have other concerns about your pregnancy. If you have labor signs at 37 weeks or more  If you have signs of labor at 37 weeks or more, your doctor may tell you to call when your labor becomes more active. Symptoms of active labor include:  · Contractions that are regular. · Contractions that are less than 5 minutes apart. · Contractions that are hard to talk through. Follow-up care is a key part of your treatment and safety. Be sure to make and go to all appointments, and call your doctor if you are having problems. It's also a good idea to know your test results and keep a list of the medicines you take. Where can you learn more? Go to https://tavia.[employer]. org and sign in to your [employer] account. Enter  in the [facility] box to learn more about \"Learning About When to Call Your Doctor During Pregnancy (After 20 Weeks). \"     If you do not have an account, please click on the \"Sign Up Now\" link. Current as of: June 16, 2021               Content Version: 13.1  © 4620-8571 Healthwise, Incorporated. Care instructions adapted under license by [facility] ([facility]). If you have questions about a medical condition or this instruction, always ask your healthcare professional. [place] [address] any warranty or liability for your use of this information. Patient Education        Learning About When to Call Your Doctor During Pregnancy (After 20 Weeks)  Overview  It's common to have concerns about what might be a problem when you're pregnant. Most pregnancies don't have any serious problems. But it's still important to know when to call your doctor if you have certain symptoms or signs of labor. These are general suggestions. Your doctor may give you some more information about when to call. When to call your doctor (after 20 weeks)  Call 911  anytime you think you may need emergency care. For example, call if:  · You have severe vaginal bleeding. · You have sudden, severe pain in your belly. · You passed out (lost consciousness). · You have a seizure. · You see or feel the umbilical cord. · You think you are about to deliver your baby and can't make it safely to the hospital.  Call your doctor now or seek immediate medical care if:  · You have vaginal bleeding. · You have belly pain. · You have a fever. · You have symptoms of preeclampsia, such as:  ? Sudden swelling of your face, hands, or feet. ? New vision problems (such as dimness, blurring, or seeing spots). ? A severe headache. · You have a sudden release of fluid from your vagina. (You think your water broke.)  · You think that you may be in labor. This means that you've had at least 6 contractions in an hour. · You notice that your baby has stopped moving or is moving much less than normal.  · You have symptoms of a urinary tract infection. These may include:  ? Pain or burning when you urinate. ? A frequent need to urinate without being able to pass much urine.   ? Pain in the flank, which is just below the rib cage and above the waist on either side of the back. ? Blood in your urine. Watch closely for changes in your health, and be sure to contact your doctor if:  · You have vaginal discharge that smells bad. · You have skin changes, such as:  ? A rash. ? Itching. ? Yellow color to your skin. · You have other concerns about your pregnancy. If you have labor signs at 37 weeks or more  If you have signs of labor at 37 weeks or more, your doctor may tell you to call when your labor becomes more active. Symptoms of active labor include:  · Contractions that are regular. · Contractions that are less than 5 minutes apart. · Contractions that are hard to talk through. Follow-up care is a key part of your treatment and safety. Be sure to make and go to all appointments, and call your doctor if you are having problems. It's also a good idea to know your test results and keep a list of the medicines you take. Where can you learn more? Go to https://[employer]. org and sign in to your [employer] account. Enter  in the [employer] box to learn more about \"Learning About When to Call Your Doctor During Pregnancy (After 20 Weeks). \"     If you do not have an account, please click on the \"Sign Up Now\" link. Current as of: June 16, 2021               Content Version: 13.1  © 3050-1745 Healthwise, Incorporated. Care instructions adapted under license by [facility] ([facility]). If you have questions about a medical condition or this instruction, always ask your healthcare professional. [place] [address] any warranty or liability for your use of this information. Please arrive for your scheduled appointment at least 15 minutes early with your actual insurance card+ a photo ID. Also if you need any refills ordered or have questions, it may take up 48 hours to reply. Please allow ample time for your refills. Call me when you use last refill. Thank you for your cooperation.  If you are experiencing an emergency and need immediate help, call 911 or go to go emergency room or labor and delivery. if you are sick, not feeling well or have an infectious process going on please reschedule your appointment by calling [phone]. Also if any family members are not feeling well, please do not bring them to your appointment. We appreciate your cooperation. We are doing this in order to protect our pregnant mothers+ their babies. Call your primary obstetrician with bleeding, leaking of fluid, abdominal tenderness, headache, blurry vision, epigastric pain and increased urinary frequency. Call your primary obstetrician with bleeding, leaking of fluid, abdominal tenderness, headache, blurry vision, epigastric pain and increased urinary frequency.

## 2022-02-11 NOTE — PROGRESS NOTES
Pt here for pregnancy ultrsound  Pt has full bladder  Pt denies any contractions/lof/or bleeding  Pt feeing fetal movement

## 2022-02-11 NOTE — PROGRESS NOTES
22    Fritz Pitts MD  [address] [facility] for  Phill Gentile     RE:  Kayley Ashraf  : 1995   AGE: 27 y.o. This report has been created using voice recognition software. It may contain errors which are inherent in voice recognition technology. Dear Dr. Souza Spotted:    Charanjit Farr had an appointment today for the following indications:    Patient Active Problem List   Diagnosis    MDD (major depressive disorder)    H/O  delivery, currently pregnant    Previous  section complicating pregnancy, antepartum condition or complication     death in prior pregnancy, currently pregnant    Continuing pregnancy after intrauterine death of one twin with intrauterine retention   Italo Gilbert Dichorionic diamniotic twin pregnancy, antepartum    COVID-19 affecting pregnancy in first trimester    Short cervix during pregnancy in second trimester    Cervical funneling affecting pregnancy in second trimester     Charanjit Farr is a 27 y.o. female, who is G6(2,2,1,3). She has an Estimated Date of Delivery: 22 based on her previous ultrasound assessment. She is currently 20 weeks 0 days gestation based on that assessment. She has dichorionic, diamniotic twins with demise of one of the twins at 11 weeks gestational age based on the crown-rump length of the demise baby on 2022. The patient had a short cervix with funneling of the amniotic membranes noted on 2/10/2022. She had no complaint of abdominal pain or tenderness. She has had no vaginal bleeding or leaking of amniotic fluid. The patient had an early term delivery with her first pregnancy in , delivery at 42 weeks gestational age. She subsequently had a  delivery at 23 weeks 6 days gestational age in  delivered by  section. She received progesterone with her pregnancy in , and delivered at 42 weeks gestational age.   She had a first trimester pregnancy loss in 2018 at 8 weeks gestational age. She then received progesterone in 2019 with a pregnancy delivered at 35 weeks 1 day gestational age secondary to placental abruption. Based on the patient's history she is a candidate for 17-P injections to be continue until 39 weeks gestational age, unless she has a clinical indication to discontinue the medication prior to that time. The patient had a  with her second pregnancy at 23 weeks 5 days, followed by 2 VBACS. The patient had COVID-19 during the first trimester of her pregnancy (10/16/2021). She may be at increased risk for  morbidity and mortality including, but not limited to  labor and delivery and stillbirth. There is still limited information regarding the effect of COVID-19 in pregnancy, with available studies sometimes demonstrating conflicting results. It is known that high fevers during pregnancy have been associated with an increased risk for fetal neural tube defects, cleft lip with or without cleft palate, colonic atresia/stenosis, renal agenesis/hypoplasia, limb reduction defect, and gastroschisis. Women who developed COVID-19 during pregnancy are more likely than nonpregnant woman with COVID-19 to need care in intensive care units, to need ventilatory support, and to die from the illness. The risk of overall severe illness and death from pregnant women remains low. Pregnant women with additional health concerns such as obesity, hypertension and gestational diabetes may have a higher risk for severe illness, as seen with nonpregnant women with these compounding variables. Pregnant women of color have a higher rate of illness and death from COVID-19 compared to other pregnant women. This appears to be more related to social, health and economic inequities that put them at greater risk for illness.   COVID-19 may be passed to the fetus during pregnancy but this seems to be uncommon as of the current available information. Based on what is known about vaccine for COVID-19 in pregnancy, experts believe that the vaccine appears to be safe and should be offered to pregnant women, particularly those in at risk groups such as healthcare workers and other essential workers. Information regarding the safety of COVID-19 vaccination in pregnancy has been limited thus far. A fetal ultrasound evaluation was performed on 1/13/2022. A detailed report included in the EMR under the imaging tab. A living intrauterine fetus was identified in the cephalic presentation, with normal fetal heart motion and normal fetal motion noted. There was a second demised fetus with a crown-rump length consistent with 8 weeks 2 days. The placenta was anterior. Dichorionic diamniotic placentation was noted. The amniotic fluid volume was within normal limits and the gestational sac with a living baby. The estimated fetal weight of the living baby was 152 grams. The biometric measurements of the living baby, were consistent with patient's established dating parameters, within the limits of error the test for current gestational age. Visualization of the fetal anatomy was somewhat limited secondary to the fetal position. There were no apparent gross fetal anatomic abnormalities noted in the areas which were visualized. A fetal ultrasound evaluation was performed on 2/10/2020 to a detailed report included in the EMR under the imaging tab. A living garibay intrauterine fetus was identified in the cephalic presentation, with normal fetal heart motion and normal fetal motion noted. The amniotic fluid index is 14.2 cm. The placenta was on the anterior and fundal surfaces of the uterus. The biometric measurements were consistent with the patient's established dating parameters, within the limits of error the test at the current gestational age. The estimated fetal weight was 293 g.   No apparent gross fetal anatomic abnormalities were identified in the areas which were visualized. A living garibay intrauterine fetus was identified on the ultrasound assessment again today. The amniotic fluid volume was within normal limits. There is normal fetal heart motion and normal fetal motion noted. GENETIC SCREENING/TERATOLOGY COUNSELING                  (Includes patient, FTB, and any affected family members)    Patient Age > 35 Years NO   Thalassemia ( MVC<80) NO   Congential Heart Defect NO   Neural Tube Defect NO   Rich-Sachs NO   Sickle Cell Disease NO   Sickle Cell Trait NO   Sickle C Disease or Trait NO   Hemophilia NO   Muscular Dystrophy NO   Cystic Fibrosis NO   Darryn Disease NO   Autism NO   Mental Retardation NO   History of Fragile X NO   Maternal Diabetes NO   Other Genetic Disease or Syndrome NO   Previous Child With Congenital Abnormality Not Listed NO   Recreational Drugs NO                                        INFECTION HISTORY     HEPATITIS IMMUNIZED:  YES   HEPATITIS INFECTION:  NO   EXPOSURE TO TB NO   PARVOVIRUS B-19 NO   CHICKEN POX  NO   MEASLES NO   HIV NO   OTHER RASH OR VIRAL ILLNESS SINCE LMP NO   UTI RECURRENT NO   HPV NO     OB History    Para Term  AB Living   6 4 2 2 1 3   SAB IAB Ectopic Molar Multiple Live Births   1       0 4      # Outcome Date GA Lbr Oziel/2nd Weight Sex Delivery Anes PTL Lv   6 Current            5  05/15/19 35w1d / 00:17 4 lb 10.1 oz (2.1 kg) F Vag-Spont None Y JESSICA      Complications: Abruptio Placenta   4 SAB  8w0d          3 Term 17 37w0d  5 lb 11 oz (2.58 kg) M  EPI N JESSICA   2  13 23w6d 02:55 1 lb 10.1 oz (0.74 kg) M CS-LTranv  Y ND      Birth Comments: 3 wk nicu, passed after 3 wks   1 Term 01/31/10 37w0d  5 lb 1 oz (2.296 kg) M Vag-Spont None N JESSICA     PAST GYNECOLOGICAL  HISTORY:  Negative for abnormal pap smears. Positive for sexually transmitted diseases.  [address]   Negative for cervical LEEP / conization /cryosurgery. Positive for uterine surgery.  followed by   Negative for ovarian or tubal surgery. Past Medical History:   Diagnosis Date    MDD (major depressive disorder) 2015    STD (female)        Past Surgical History:   Procedure Laterality Date    ABDOMEN SURGERY      C SECTION      SECTION      DILATION AND CURETTAGE      DILATION AND CURETTAGE OF UTERUS         Allergies   Allergen Reactions    Pcn [Penicillins] Hives     Current Outpatient Medications:     aspirin EC 81 MG EC tablet, Take 2 tablets by mouth daily    ferrous sulfate 325 (65 Fe) MG tablet, Take 1 tablet by mouth 2 times daily (with meals)    folic acid (FOLVITE) 1 MG tablet, take 1 tablet by mouth three times a day    Prenatal MV-Min-Fe Fum-FA-DHA (PRENATAL 1 PO), Take by mouth    Social History     Tobacco Use    Smoking status: Former Smoker     Packs/day: 1.00     Years: 8.00     Pack years: 8.00     Types: Cigars     Quit date: 2021     Years since quittin.6    Smokeless tobacco: Never Used   Substance Use Topics    Alcohol use: No     Comment: social       FAMILY MEDICAL HISTORY:   Negative for congenital abnormalities, autism, genetic disease and mental retardation, not listed above. Review of Systems :   CONSTITUTIONAL : No fever, no chills   HEENT : No headache, no visual changes, no rhinorrhea, no sore throat   CARDIOVASCULAR : No pain, no palpitations, no edema   RESPIRATORY : No pain, no shortness of breath   GASTROINTESTINAL : No N/V, no D/C, no abdominal pain   GENITOURINARY : No dysuria, hematuria and no incontinence   MUSCULOSKELETAL : No myalgia, No back pain  NEUROLOGICAL : No numbness, no tingling, no tremors. No history of seizures  ALL OTHER SYSTEMS WERE REPORTED AS NEGATIVE.     PERTINENT PHYSICAL EXAMINATION:   /71   Pulse 128   Wt 116 lb 8 oz (52.8 kg)   LMP 2021   BMI 21.31 kg/m²   Urine dipstick:   Negative for Glucose    Trace for Albumin    GENERAL:   The patient is a well developed, female who is alert cooperative and oriented times three in no acute distress. HEENT:  Normo cephalic and atraumatic. No facial edema. ABDOMEN:   Her uterus is gravid. She had no complaint of abdominal pain or tenderness. The fetal heart rate is 155 bpm. The fetus was in the cephalic presentation which was confirmed by the ultrasound assessment. EXTREMITIES:  No peripheral edema is noted. PELVIC EXAMINATION:  Transvaginal ultrasound assessment of the cervix was performed. The cervical length was 11 mm, with an irregularly shaped follow-up in the endocervical canal.  The anterior uterine wall appeared to be thin. IMPRESSION:    1. Dichorionic, diamniotic twin IUP at 20 weeks 0 days with demise of 1 twin and JUDITH: 22    2. Previous delivery at 24 weeks secondary to  labor    3. Previous  delivery at 35 weeks    4. Previous  followed by 2 VBACS    5. Most recent delivery was at 35 weeks 1 day secondary to a placental abruption    6. NIPT results were not available for review 2022    7. Current pregnancy is dichorionic, diamniotic twins with demise of one of the twins at 11 weeks    6. Demise twin is retained     9. Short cervical length with funneling of the amniotic membranes on 2022  10.  COVID-19 infection 10/16/2021 in the first trimester of her pregnancy. 11. Not vaccinated for COVID-19  12. Scheduled for 17-P injection on 2022 at 10 AM at her home. 13.  Declined cervical cerclage 2022  14. Consented to adding vaginal progesterone 200 mg each evening 2022    PLAN:  The patient is to continue to receive 17-P injections until 36 weeks gestational age, unless there is a clinical indication to discontinue the medication prior to that time. She is also used 200 mg of vaginal progesterone administered each evening until 36 weeks gestational age.     I discussed with the patient the option of placing a cervical cerclage today. She stated that her cervix was short throughout her 2 most recent pregnancies pregnancies, with which she delivered at 40 and 27 weeks gestational age. She declined placement of a cervical cerclage today understanding that her cervix was short, and at a level that placement of a cervical cerclage could be considered. I recommended that the patient return for follow-up assessment on 2/11/2022 to confirm the cervical length and reassess the anterior uterine wall when the patient's bladder is full. I advised the patient that the [facility] of Obstetrics and Gynecology and The Society for Maternal Fetal Medicine recommend that all pregnant women be vaccinated for COVID-19. \"Data have shown that COVID-19 infection puts pregnant people at increased risk of severe complications and even death; yet only about 22% of pregnant individuals have received 1 more doses of COVID-19 vaccine according to the [facility] and the [facility] for Disease Control and Prevention. \"    \" Recent data have shown that more than 95% of those were hospitalized and/or dying from COVID-19 are those who have remained unvaccinated. Pregnant individuals who have decided to wait until after delivery to be vaccinated may be inadvertently exposing themselves to an increased risk of severe illness or death. \"     \" COVID-19 vaccination is the best testing to reduce maternal and fetal complications of XSTOM-11 infection among pregnant people,\" said Oli Clemente MD, president of the Society for Maternal Fetal Medicine, sub-specialists. The patient was advised to call if she has any increased vaginal discharge, vaginal bleeding, contractions, abdominal pain, back pain or any new significant symptomatology prior to her next visit. I advised her that these are signs and symptoms of cervical change and require follow-up assessment when they occur.   The patient was advised to remain sexually abstinent through the balance of her pregnancy. I requested the patient return for a follow-up assessment on 2022, unless there is a clinical reason for her to return prior to that time. She is to call if she has any problems or questions prior to her next visit. Further evaluation and management will be dependent on her clinical presentation and the results of her testing. The patient is to continue to follow with you in your office for ongoing obstetric care. The total time in minutes spent with the patient, reviewing medical records, reviewing imaging studies, performing ultrasonic imaging, reviewing laboratory testing, and documenting information was 17 minutes, of which, 50% of the time was spent in patient education, counseling, and coordinating care with the patient, her provider, and/or her family. Available electronic and paper medical records were reviewed. I discussed the finding of the twin intrauterine gestation with demise of one of the fetuses with the patient's today. I discussed with her her increased risks related to having COVID-19 infection during her pregnancy. We discussed her increased risks associated with her prior history of placental abruption. I advised her that the results of her NIPT assessment may not be reliable secondary to demise of one of her twins at an early gestational age. I discussed with her the recommendation to administer 17-P injections through the balance for pregnancy for prophylaxis for  labor. The patient was familiar with 17 -P, since she had these injections with 2 prior pregnancies following her  delivery. I reviewed with the patient current recommendations that she be vaccinated for COVID-19 secondary to the significant increased risk for COVID-19 infected patients that are pregnant.   I advised her that natural immunity from COVID-19 from her prior infection may wane significantly over short period of time, and may not be protective for new variance of the COVID-19 infection. I discussed with the patient the results of her ultrasound assessment performed today, including the finding of a short cervix with funneling of the amniotic membranes noted on transvaginal ultrasound assessment. I answered all of her questions to her satisfaction. I asked her to call if she had any additional questions prior to her next visit. If you have any questions regarding her management, please contact me at your convenience and thank you for allowing me to participate in her care.     Sincerely,        Ginny Singh MD, MS, Lexi Torres, ЕКАТЕРИНА, RDMS, RVT  Director [address] [place] [address]  [phone]

## 2022-02-18 ENCOUNTER — ANCILLARY PROCEDURE (OUTPATIENT)
Dept: OBGYN CLINIC | Age: 27
End: 2022-02-18
Payer: COMMERCIAL

## 2022-02-18 ENCOUNTER — ROUTINE PRENATAL (OUTPATIENT)
Dept: OBGYN CLINIC | Age: 27
End: 2022-02-18
Payer: COMMERCIAL

## 2022-02-18 VITALS
DIASTOLIC BLOOD PRESSURE: 70 MMHG | BODY MASS INDEX: 21.4 KG/M2 | WEIGHT: 117 LBS | HEART RATE: 69 BPM | SYSTOLIC BLOOD PRESSURE: 105 MMHG

## 2022-02-18 DIAGNOSIS — O34.32 CERVICAL FUNNELING AFFECTING PREGNANCY IN SECOND TRIMESTER: ICD-10-CM

## 2022-02-18 DIAGNOSIS — O30.049 DICHORIONIC DIAMNIOTIC TWIN PREGNANCY, ANTEPARTUM: ICD-10-CM

## 2022-02-18 DIAGNOSIS — O09.899 H/O PRETERM DELIVERY, CURRENTLY PREGNANT: ICD-10-CM

## 2022-02-18 DIAGNOSIS — O31.20X0 CONTINUING PREGNANCY AFTER INTRAUTERINE DEATH OF ONE TWIN WITH INTRAUTERINE RETENTION: ICD-10-CM

## 2022-02-18 DIAGNOSIS — O34.219 PREVIOUS CESAREAN SECTION COMPLICATING PREGNANCY, ANTEPARTUM CONDITION OR COMPLICATION: ICD-10-CM

## 2022-02-18 DIAGNOSIS — O09.299 NEONATAL DEATH IN PRIOR PREGNANCY, CURRENTLY PREGNANT: ICD-10-CM

## 2022-02-18 DIAGNOSIS — O98.511 COVID-19 AFFECTING PREGNANCY IN FIRST TRIMESTER: ICD-10-CM

## 2022-02-18 DIAGNOSIS — O26.872 SHORT CERVIX DURING PREGNANCY IN SECOND TRIMESTER: Primary | ICD-10-CM

## 2022-02-18 DIAGNOSIS — Z3A.21 21 WEEKS GESTATION OF PREGNANCY: ICD-10-CM

## 2022-02-18 DIAGNOSIS — U07.1 COVID-19 AFFECTING PREGNANCY IN FIRST TRIMESTER: ICD-10-CM

## 2022-02-18 LAB
GLUCOSE URINE, POC: NORMAL
PROTEIN UA: POSITIVE

## 2022-02-18 PROCEDURE — 81002 URINALYSIS NONAUTO W/O SCOPE: CPT | Performed by: OBSTETRICS & GYNECOLOGY

## 2022-02-18 PROCEDURE — G8420 CALC BMI NORM PARAMETERS: HCPCS | Performed by: OBSTETRICS & GYNECOLOGY

## 2022-02-18 PROCEDURE — 99213 OFFICE O/P EST LOW 20 MIN: CPT | Performed by: OBSTETRICS & GYNECOLOGY

## 2022-02-18 PROCEDURE — 1036F TOBACCO NON-USER: CPT | Performed by: OBSTETRICS & GYNECOLOGY

## 2022-02-18 PROCEDURE — 99212 OFFICE O/P EST SF 10 MIN: CPT | Performed by: OBSTETRICS & GYNECOLOGY

## 2022-02-18 PROCEDURE — G8427 DOCREV CUR MEDS BY ELIG CLIN: HCPCS | Performed by: OBSTETRICS & GYNECOLOGY

## 2022-02-18 PROCEDURE — G8484 FLU IMMUNIZE NO ADMIN: HCPCS | Performed by: OBSTETRICS & GYNECOLOGY

## 2022-02-18 PROCEDURE — 76817 TRANSVAGINAL US OBSTETRIC: CPT | Performed by: OBSTETRICS & GYNECOLOGY

## 2022-02-18 RX ORDER — HYDROXYPROGESTERONE CAPROATE 250 MG/ML
250 INJECTION INTRAMUSCULAR
Status: ON HOLD | COMMUNITY
End: 2022-02-21 | Stop reason: SDUPTHER

## 2022-02-18 NOTE — PROGRESS NOTES
States baby is active Denies bleeding leakage of fluid or contractions. Call your obstetrician for:    Bleeding, leakage of fluid, abdominal tenderness, headaches, burred vision or right upper quadrant  pain or decreased fetal movement or increased in urinary frequency/burning.    Call if any questions or concerns

## 2022-02-18 NOTE — PATIENT INSTRUCTIONS
Patient Education        Weeks 18 to 22 of Your Pregnancy: Care Instructions  Overview     Your baby is continuing to develop quickly. Sometime between 18 and 22 weeks, you'll probably start to feel your baby move. At first, these small fetal movements feel like fluttering or \"butterflies. \" Or they may feel like gas bubbles. As your baby grows, these movements will become stronger. You may also notice that your baby hiccups. Babies at this stage can now suck their thumbs. You may find that your nausea and fatigue are gone. You may feel better overall and have more energy than you did in your first trimester. But you might now also have some new discomforts, like sleep problems or leg cramps. Talk to your doctor about things you can do at home to ease these problems. Follow-up care is a key part of your treatment and safety. Be sure to make and go to all appointments, and call your doctor if you are having problems. It's also a good idea to know your test results and keep a list of the medicines you take. How can you care for yourself at home? Ease sleep problems  · Avoid caffeine in drinks or chocolate late in the day. · Get some exercise every day. · Take a warm shower or bath before bed. · Have a light snack or glass of milk at bedtime. · Do relaxation exercises in bed to calm your mind and body. · Support your legs and back with extra pillows. Try a pillow between your legs if you sleep on your side. · Do not use sleeping pills or alcohol. They could harm your baby. Ease leg cramps  · Do not massage your calf during the cramp. · Sit on a firm bed or chair. Straighten your leg, and bend your foot (flex your ankle) slowly upward, toward your knee. Bend your toes up and down. · Stand on a cool, flat surface. Stretch your toes upward, and take small steps walking on your heels. · Use a heating pad or hot water bottle to help with muscle ache. Prevent leg cramps  · Be sure to get enough calcium.  If you are worried that you are not getting enough, talk to your doctor. · Exercise every day, and stretch your legs before bed. · Take a warm bath before bed, and try leg warmers at night. Where can you learn more? Go to https://tavia.healthHitFix. org and sign in to your [employer] account. Enter G092 in the [facility] box to learn more about \"Weeks 18 to 22 of Your Pregnancy: Care Instructions. \"     If you do not have an account, please click on the \"Sign Up Now\" link. Current as of: June 16, 2021               Content Version: 13.1  © 5751-5393 [employer]. Care instructions adapted under license by [facility] ([facility]). If you have questions about a medical condition or this instruction, always ask your healthcare professional. [place] [address] any warranty or liability for your use of this information. Patient Education        Learning About When to Call Your Doctor During Pregnancy (After 20 Weeks)  Overview  It's common to have concerns about what might be a problem when you're pregnant. Most pregnancies don't have any serious problems. But it's still important to know when to call your doctor if you have certain symptoms or signs of labor. These are general suggestions. Your doctor may give you some more information about when to call. When to call your doctor (after 20 weeks)  Call 911  anytime you think you may need emergency care. For example, call if:  · You have severe vaginal bleeding. · You have sudden, severe pain in your belly. · You passed out (lost consciousness). · You have a seizure. · You see or feel the umbilical cord. · You think you are about to deliver your baby and can't make it safely to the hospital.  Call your doctor now or seek immediate medical care if:  · You have vaginal bleeding. · You have belly pain. · You have a fever.   · You have symptoms of preeclampsia, such as:  ? Sudden swelling of your face, hands, or feet.  ? New vision problems (such as dimness, blurring, or seeing spots). ? A severe headache. · You have a sudden release of fluid from your vagina. (You think your water broke.)  · You think that you may be in labor. This means that you've had at least 6 contractions in an hour. · You notice that your baby has stopped moving or is moving much less than normal.  · You have symptoms of a urinary tract infection. These may include:  ? Pain or burning when you urinate. ? A frequent need to urinate without being able to pass much urine. ? Pain in the flank, which is just below the rib cage and above the waist on either side of the back. ? Blood in your urine. Watch closely for changes in your health, and be sure to contact your doctor if:  · You have vaginal discharge that smells bad. · You have skin changes, such as:  ? A rash. ? Itching. ? Yellow color to your skin. · You have other concerns about your pregnancy. If you have labor signs at 37 weeks or more  If you have signs of labor at 37 weeks or more, your doctor may tell you to call when your labor becomes more active. Symptoms of active labor include:  · Contractions that are regular. · Contractions that are less than 5 minutes apart. · Contractions that are hard to talk through. Follow-up care is a key part of your treatment and safety. Be sure to make and go to all appointments, and call your doctor if you are having problems. It's also a good idea to know your test results and keep a list of the medicines you take. Where can you learn more? Go to https://tavia.[employer]. org and sign in to your [employer] account. Enter  in the [facility] box to learn more about \"Learning About When to Call Your Doctor During Pregnancy (After 20 Weeks). \"     If you do not have an account, please click on the \"Sign Up Now\" link. Current as of: June 16, 2021               Content Version: 13.1  © 6752-0078 Healthwise[employer]. Care instructions adapted under license by [facility] ([facility]). If you have questions about a medical condition or this instruction, always ask your healthcare professional. [address] any warranty or liability for your use of this information.

## 2022-02-18 NOTE — PROGRESS NOTES
22    Carey Partida MD  [address] [facility]  Phill Gentile     RE:  Cleotis Boxer  : 1995   AGE: 27 y.o. This report has been created using voice recognition software. It may contain errors which are inherent in voice recognition technology. Dear Dr. Lianne Mejia:    Shine Dean had an appointment today for the following indications:    Patient Active Problem List   Diagnosis    MDD (major depressive disorder)    H/O  delivery, currently pregnant    Previous  section complicating pregnancy, antepartum condition or complication     death in prior pregnancy, currently pregnant    Continuing pregnancy after intrauterine death of one twin with intrauterine retention   Charity Bran Dichorionic diamniotic twin pregnancy, antepartum    COVID-19 affecting pregnancy in first trimester    Short cervix during pregnancy in second trimester    Cervical funneling affecting pregnancy in second trimester    21 weeks gestation of pregnancy     Shine Dean is a 27 y.o. female, who is G6(2,2,1,3). She has an Estimated Date of Delivery: 22 based on her previous ultrasound assessment. She is currently 21 weeks 0 days gestation based on that assessment. She has dichorionic, diamniotic twins with demise of one of the twins at 11 weeks gestational age based on the crown-rump length of the demise baby on 2022. The patient had a short cervix with funneling of the amniotic membranes noted on 2/10/2022. She had no complaint of abdominal pain or tenderness. She has had no vaginal bleeding or leaking of amniotic fluid. Declined placement of a cervical cerclage. The patient had an early term delivery with her first pregnancy in , delivery at 42 weeks gestational age. She subsequently had a  delivery at 23 weeks 6 days gestational age in  delivered by  section.   She received progesterone with her pregnancy in , and during pregnancy but this seems to be uncommon as of the current available information. Based on what is known about vaccine for COVID-19 in pregnancy, experts believe that the vaccine appears to be safe and should be offered to pregnant women, particularly those in at risk groups such as healthcare workers and other essential workers. Information regarding the safety of COVID-19 vaccination in pregnancy has been limited thus far. A fetal ultrasound evaluation was performed on 1/13/2022. A detailed report included in the EMR under the imaging tab. A living intrauterine fetus was identified in the cephalic presentation, with normal fetal heart motion and normal fetal motion noted. There was a second demised fetus with a crown-rump length consistent with 8 weeks 2 days. The placenta was anterior. Dichorionic diamniotic placentation was noted. The amniotic fluid volume was within normal limits and the gestational sac with a living baby. The estimated fetal weight of the living baby was 152 grams. The biometric measurements of the living baby, were consistent with patient's established dating parameters, within the limits of error the test for current gestational age. Visualization of the fetal anatomy was somewhat limited secondary to the fetal position. There were no apparent gross fetal anatomic abnormalities noted in the areas which were visualized. A fetal ultrasound evaluation was performed on 2/10/2020 to a detailed report included in the EMR under the imaging tab. A living garibay intrauterine fetus was identified in the cephalic presentation, with normal fetal heart motion and normal fetal motion noted. The amniotic fluid index is 14.2 cm. The placenta was on the anterior and fundal surfaces of the uterus. The biometric measurements were consistent with the patient's established dating parameters, within the limits of error the test at the current gestational age.   The estimated fetal weight was 293 grams. No apparent gross fetal anatomic abnormalities were identified in the areas which were visualized. A living garibay intrauterine fetus was identified on the ultrasound assessment again today. The amniotic fluid volume was within normal limits. There is normal fetal heart motion and normal fetal motion noted. GENETIC SCREENING/TERATOLOGY COUNSELING                  (Includes patient, FTB, and any affected family members)    Patient Age > 35 Years NO   Thalassemia ( MVC<80) NO   Congential Heart Defect NO   Neural Tube Defect NO   Rich-Sachs NO   Sickle Cell Disease NO   Sickle Cell Trait NO   Sickle C Disease or Trait NO   Hemophilia NO   Muscular Dystrophy NO   Cystic Fibrosis NO   Craig Disease NO   Autism NO   Mental Retardation NO   History of Fragile X NO   Maternal Diabetes NO   Other Genetic Disease or Syndrome NO   Previous Child With Congenital Abnormality Not Listed NO   Recreational Drugs NO                                        INFECTION HISTORY     HEPATITIS IMMUNIZED:  YES   HEPATITIS INFECTION:  NO   EXPOSURE TO TB NO   PARVOVIRUS B-19 NO   CHICKEN POX  NO   MEASLES NO   HIV NO   OTHER RASH OR VIRAL ILLNESS SINCE LMP NO   UTI RECURRENT NO   HPV NO     OB History    Para Term  AB Living   6 4 2 2 1 3   SAB IAB Ectopic Molar Multiple Live Births   1       0 4      # Outcome Date GA Lbr Oziel/2nd Weight Sex Delivery Anes PTL Lv   6 Current            5  05/15/19 35w1d / 00:17 4 lb 10.1 oz (2.1 kg) F Vag-Spont None Y JESSICA      Complications: Abruptio Placenta   4 SAB  8w0d          3 Term 17 37w0d  5 lb 11 oz (2.58 kg) M  EPI N JESSICA   2  13 23w6d 02:55 1 lb 10.1 oz (0.74 kg) M CS-LTranv  Y ND      Birth Comments: 3 wk nicu, passed after 3 wks   1 Term 01/31/10 37w0d  5 lb 1 oz (2.296 kg) M Vag-Spont None N JESSICA     PAST GYNECOLOGICAL  HISTORY:  Negative for abnormal pap smears.    Positive for sexually transmitted diseases. [address]   Negative for cervical LEEP / conization /cryosurgery. Positive for uterine surgery.  followed by   Negative for ovarian or tubal surgery. Past Medical History:   Diagnosis Date    MDD (major depressive disorder) 2015    STD (female)        Past Surgical History:   Procedure Laterality Date    ABDOMEN SURGERY      C SECTION      SECTION      DILATION AND CURETTAGE      DILATION AND CURETTAGE OF UTERUS  2018       Allergies   Allergen Reactions    Pcn [Penicillins] Hives     Current Outpatient Medications:     aspirin EC 81 MG EC tablet, Take 2 tablets by mouth daily    ferrous sulfate 325 (65 Fe) MG tablet, Take 1 tablet by mouth 2 times daily (with meals)    folic acid (FOLVITE) 1 MG tablet, take 1 tablet by mouth three times a day    Prenatal MV-Min-Fe Fum-FA-DHA (PRENATAL 1 PO), Take by mouth    Social History     Tobacco Use    Smoking status: Former Smoker     Packs/day: 1.00     Years: 8.00     Pack years: 8.00     Types: Cigars     Quit date: 2021     Years since quittin.6    Smokeless tobacco: Never Used   Substance Use Topics    Alcohol use: No     Comment: social       FAMILY MEDICAL HISTORY:   Negative for congenital abnormalities, autism, genetic disease and mental retardation, not listed above. Review of Systems :   CONSTITUTIONAL : No fever, no chills   HEENT : No headache, no visual changes, no rhinorrhea, no sore throat   CARDIOVASCULAR : No pain, no palpitations, no edema   RESPIRATORY : No pain, no shortness of breath   GASTROINTESTINAL : No N/V, no D/C, no abdominal pain   GENITOURINARY : No dysuria, hematuria and no incontinence   MUSCULOSKELETAL : No myalgia, No back pain  NEUROLOGICAL : No numbness, no tingling, no tremors. No history of seizures  ALL OTHER SYSTEMS WERE REPORTED AS NEGATIVE.     PERTINENT PHYSICAL EXAMINATION:   /70   Pulse 69   Wt 117 lb (53.1 kg)   LMP 2021   BMI 21.40 kg/m²   Urine dipstick:   Negative for Glucose    Trace for Albumin    GENERAL:   The patient is a well developed, female who is alert cooperative and oriented times three in no acute distress. HEENT:  Normo cephalic and atraumatic. No facial edema. ABDOMEN:   Her uterus is gravid. She had no complaint of abdominal pain or tenderness. The fetal heart rate is 155 bpm. The fetus was in the cephalic presentation which was confirmed by the ultrasound assessment. EXTREMITIES:  No peripheral edema is noted. PELVIC EXAMINATION:  Transvaginal ultrasound assessment of the cervix was performed. The cervical length was 11 mm, with an irregularly shaped follow-up in the endocervical canal.  The anterior uterine wall appeared to be thin. IMPRESSION:    1. Dichorionic, diamniotic twin IUP at 21 weeks 0 days with demise of 1 twin and JUDITH: 22    2. Previous delivery at 24 weeks secondary to  labor    3. Previous  delivery at 35 weeks    4. Previous  followed by 2 VBACS    5. Most recent delivery was at 35 weeks 1 day secondary to a placental abruption    6. NIPT results were not available for review 2022    7. Current pregnancy is dichorionic, diamniotic twins with demise of one of the twins at 11 weeks    6. Demise twin is retained     9. Short cervical length with funneling of the amniotic membranes on 2022  10.  COVID-19 infection 10/16/2021 in the first trimester of her pregnancy. 11. Not vaccinated for COVID-19  12. Scheduled for 17-P injections weekly at her home. 13.  Declined cervical cerclage 2022  14. Consented to adding vaginal progesterone 200 mg each evening    PLAN:  The patient is to continue to receive 17-P injections until 36 weeks gestational age, unless there is a clinical indication to discontinue the medication prior to that time.   She is also used 200 mg of vaginal progesterone administered each evening until 36 weeks gestational age.    I discussed with the patient the option of placing a cervical cerclage again today. She stated that her cervix was short throughout her 2 most recent pregnancies pregnancies, with which she delivered at 40 and 27 weeks gestational age. She declined placement of a cervical cerclage today understanding that her cervix was short, and at a level that placement of a cervical cerclage could be considered. I recommended that the patient return for follow-up assessment on 2/11/2022 to confirm the cervical length and reassess the anterior uterine wall when the patient's bladder is full. I advised the patient that the [employer] of Obstetrics and Gynecology and The Society for Maternal Fetal Medicine recommend that all pregnant women be vaccinated for COVID-19. \"Data have shown that COVID-19 infection puts pregnant people at increased risk of severe complications and even death; yet only about 22% of pregnant individuals have received 1 more doses of COVID-19 vaccine according to the [employer] for Disease Control and Prevention. \"    \" Recent data have shown that more than 95% of those were hospitalized and/or dying from COVID-19 are those who have remained unvaccinated. Pregnant individuals who have decided to wait until after delivery to be vaccinated may be inadvertently exposing themselves to an increased risk of severe illness or death. \"     \" COVID-19 vaccination is the best testing to reduce maternal and fetal complications of JGWLF-89 infection among pregnant people,\" said Bayron Katz MD, president of the Society for Maternal Fetal Medicine, sub-specialists. The patient was advised to call if she has any increased vaginal discharge, vaginal bleeding, contractions, abdominal pain, back pain or any new significant symptomatology prior to her next visit. I advised her that these are signs and symptoms of cervical change and require follow-up assessment when they occur.   The patient was advised to remain sexually abstinent through the balance of her pregnancy. I requested the patient return for a follow-up assessment on 2022, unless there is a clinical reason for her to return prior to that time. She is to call if she has any problems or questions prior to her next visit. Further evaluation and management will be dependent on her clinical presentation and the results of her testing. The patient is to continue to follow with you in your office for ongoing obstetric care. The total time in minutes spent with the patient, reviewing medical records, reviewing imaging studies, performing ultrasonic imaging, reviewing laboratory testing, and documenting information was 14 minutes, of which, 50% of the time was spent in patient education, counseling, and coordinating care with the patient, her provider, and/or her family. Available electronic and paper medical records were reviewed. I discussed the finding of the twin intrauterine gestation with demise of one of the fetuses with the patient's today. I discussed with her her increased risks related to having COVID-19 infection during her pregnancy. We discussed her increased risks associated with her prior history of placental abruption. I discussed with her the recommendation to administer 17-P injections through the balance for pregnancy for prophylaxis for  labor. The patient was familiar with 17 -P, since she had these injections with 2 prior pregnancies following her  delivery. I reviewed with the patient current recommendations that she be vaccinated for COVID-19 secondary to the significant increased risk for COVID-19 infected patients that are pregnant. I advised her that natural immunity from COVID-19 from her prior infection may wane significantly over short period of time, and may not be protective for new variants of the COVID-19 infection.   I discussed with the patient the results of her ultrasound assessment performed today, including the finding of a short cervix with funneling of the amniotic membranes noted on transvaginal ultrasound assessment. I answered all of her questions to her satisfaction. I asked her to call if she had any additional questions prior to her next visit. If you have any questions regarding her management, please contact me at your convenience and thank you for allowing me to participate in her care.     Sincerely,        Luis Alberto Suárez MD, MS, Gaby Giles RDCS, RDMS, RVT  Director [address] [place] [address]  [phone]

## 2022-02-21 ENCOUNTER — HOSPITAL ENCOUNTER (OUTPATIENT)
Age: 27
Discharge: HOME | DRG: 560 | End: 2022-02-21
Attending: OBSTETRICS & GYNECOLOGY | Admitting: OBSTETRICS & GYNECOLOGY
Payer: COMMERCIAL

## 2022-02-21 ENCOUNTER — ANCILLARY PROCEDURE (OUTPATIENT)
Dept: OBGYN CLINIC | Age: 27
DRG: 560 | End: 2022-02-21
Payer: COMMERCIAL

## 2022-02-21 VITALS
RESPIRATION RATE: 20 BRPM | TEMPERATURE: 98.2 F | HEART RATE: 76 BPM | DIASTOLIC BLOOD PRESSURE: 61 MMHG | SYSTOLIC BLOOD PRESSURE: 116 MMHG

## 2022-02-21 DIAGNOSIS — O09.899 H/O PRETERM DELIVERY, CURRENTLY PREGNANT: ICD-10-CM

## 2022-02-21 PROBLEM — Z64.1 MULTIPARITY: Status: ACTIVE | Noted: 2022-02-21

## 2022-02-21 PROCEDURE — G0378 HOSPITAL OBSERVATION PER HR: HCPCS

## 2022-02-21 PROCEDURE — 76820 UMBILICAL ARTERY ECHO: CPT | Performed by: OBSTETRICS & GYNECOLOGY

## 2022-02-21 PROCEDURE — 76821 MIDDLE CEREBRAL ARTERY ECHO: CPT | Performed by: OBSTETRICS & GYNECOLOGY

## 2022-02-21 PROCEDURE — 2580000003 HC RX 258: Performed by: OBSTETRICS & GYNECOLOGY

## 2022-02-21 PROCEDURE — 76815 OB US LIMITED FETUS(S): CPT | Performed by: OBSTETRICS & GYNECOLOGY

## 2022-02-21 PROCEDURE — 99212 OFFICE O/P EST SF 10 MIN: CPT

## 2022-02-21 RX ORDER — SODIUM CHLORIDE, SODIUM LACTATE, POTASSIUM CHLORIDE, AND CALCIUM CHLORIDE .6; .31; .03; .02 G/100ML; G/100ML; G/100ML; G/100ML
500 INJECTION, SOLUTION INTRAVENOUS ONCE
Status: COMPLETED | OUTPATIENT
Start: 2022-02-21 | End: 2022-02-21

## 2022-02-21 RX ORDER — SODIUM CHLORIDE, SODIUM LACTATE, POTASSIUM CHLORIDE, CALCIUM CHLORIDE 600; 310; 30; 20 MG/100ML; MG/100ML; MG/100ML; MG/100ML
INJECTION, SOLUTION INTRAVENOUS CONTINUOUS
Status: DISCONTINUED | OUTPATIENT
Start: 2022-02-21 | End: 2022-02-21 | Stop reason: HOSPADM

## 2022-02-21 RX ORDER — HYDROXYPROGESTERONE CAPROATE 250 MG/ML
250 INJECTION INTRAMUSCULAR
Qty: 1 EACH | Refills: 3 | Status: ON HOLD | OUTPATIENT
Start: 2022-02-21 | End: 2022-02-23 | Stop reason: HOSPADM

## 2022-02-21 RX ADMIN — SODIUM CHLORIDE, POTASSIUM CHLORIDE, SODIUM LACTATE AND CALCIUM CHLORIDE 500 ML: 600; 310; 30; 20 INJECTION, SOLUTION INTRAVENOUS at 13:00

## 2022-02-21 NOTE — H&P
Department of Obstetrics and Gynecology  Physician Assistant Obstetrics History and Physical      HISTORY OF PRESENT ILLNESS:      The patient is a 27 y.o.  6 parity 2213 at 24 weeks' 3 days' gestation presents to L&D from home due to bright red vaginal bleeding this morning around 08:30. She descibes noting a blood streak in her underwear prior to urnating and then another blood streak on the toilet paper after urinating, none since then. Denies BM, trauma, or sexual intercourse. She denies cramping, contractions, or back pain. Saw MFM last 2022.     Current obstetric history is significant for:  H/O  delivery, currently pregnant   Previous  section complicating pregnancy, antepartum condition or complication    death in prior pregnancy, currently pregnant   Continuing pregnancy after intrauterine death of one twin with intrauterine retention   Dichorionic diamniotic twin pregnancy, antepartum   COVID-19 affecting pregnancy in first trimester   Short cervix during pregnancy in second trimester   Cervical funneling affecting pregnancy in second trimester   21 weeks gestation of pregnancy   History pf placental abruption in previous pregnancy    Estimated Due Date:  2022  Contractions: No  Leaking of fluid: No  Bleeding:  Yes  Perceived fetal movement: good          PAST OB HISTORY:  OB History    Para Term  AB Living   6 4 2 2 1 3   SAB IAB Ectopic Molar Multiple Live Births   1       0 4      # Outcome Date GA Lbr Oziel/2nd Weight Sex Delivery Anes PTL Lv   6 Current            5  05/15/19 35w1d / 00:17 4 lb 10.1 oz (2.1 kg) F Vag-Spont None Y JESSICA      Complications: Abruptio Placenta   4 SAB  8w0d          3 Term 17 37w0d  5 lb 11 oz (2.58 kg) M  EPI N JESSICA   2  13 23w6d 02:55 1 lb 10.1 oz (0.74 kg) M CS-LTranv  Y ND      Birth Comments: 3 wk nicu, passed after 3 wks   1 Term 01/31/10 37w0d  5 lb 1 oz (2.296 kg) M Vag-Spont hesitancy; no hematuria  MSK: no joint pain, no myalgia, no change in ROM  Neuro: no focal weakness in extremities, no slurred speech, no double vision, no numbness or tingling in extremities  Endo: no heat/cold intolerance, no polyphagia, polydipsia or polyuria  Hem: no increased bleeding, no bruising, no lymphadenopathy  Skin: no skin changes  Psych: no depressed mood, no suicidal ideation  Pertinent +'s & -'s addressed in HPI    PHYSICAL EXAM:  LMP 2021 HR 89, RR 14, /67 T 98.2 F    General appearance: Comfortable  Lungs:  CTA bilaterally, good excursion  Heart:  Regular Rate & Rhythm, no murmur noted  Abdomen:  Soft, non-tender, gravid  Uterus: soft, nontender  Fetal heart tones:  Baseline Heart Rate 140's   Cervix: SSE: minimal blood noted in vaginal vault, no active bleeding; SVE: closed, 50%  Presentation: deferred  Contraction frequency:  Q 4-10 minutes with irritability, not felt by patient  Membranes:  Intact  Extremities: (-) edema       ASSESSMENT: Patient was seen and examined by myself as well as by Dr. Horacio Andrews. 28 yo AAF  IUP at 21 weeks' 3 days' gestation    Spotting    Dichorionic diamniotic twin gestation with demise of twin B  Short cervix with funneling  History of PTD with incompetent cervix  History of placental abruption previous pregnancy  Previous  section    SSE: minimal blood noted in vaginal vault, no active bleeding  SVE: closed, 50%           Plan: I left a voicemail message re: above with Dr. Rashard Mane. EFM  Observe      Electronically signed by Tra Casey PA-C on 2022 at 10:41 AM    Addendum: I spoke with Dr. Rashard Mane at 12:20. Received orders for IVF with LR, 500 cc bolus, then 125 ccs/hour, NPO status, MFM consult. Blood type O positive. Dr. Josefina Larson notified at 12:25. He will see patient later this afternoon. No further orders at this time.     Tra Casey PA-C

## 2022-02-21 NOTE — PROGRESS NOTES
Patient seen with Roselia Cummings  iup at 21 wks  Had some blood on underwear this am then when wiped  Not heavy. Denies ctx's or abd pain  No sex or trauma  History of short cervix. Declined cerclage  See dr amador's and dmitry estrada note  Rh +  Comfortable  abd soft non tender  fht's 140's  Some ctx's not felt  Speculum very small amount blood in vagina.  No active vb  cx closed/50/-3  dmitry to notify dr Jayy Albarado

## 2022-02-21 NOTE — PROGRESS NOTES
H6B8 21w3d  Patient presents to antepartum from home, patient states that she stated that she had bright red VB this morning after going to bathroom. Patient denies BM and intercourse. Denies LOF and CTX. Patient states +FM. Patient see's MFM for  labor and short cervix.

## 2022-02-22 ENCOUNTER — HOSPITAL ENCOUNTER (INPATIENT)
Age: 27
LOS: 1 days | Discharge: HOME | DRG: 560 | End: 2022-02-23
Attending: OBSTETRICS & GYNECOLOGY | Admitting: OBSTETRICS & GYNECOLOGY
Payer: COMMERCIAL

## 2022-02-22 ENCOUNTER — ANCILLARY PROCEDURE (OUTPATIENT)
Dept: OBGYN CLINIC | Age: 27
DRG: 560 | End: 2022-02-22
Payer: COMMERCIAL

## 2022-02-22 PROBLEM — O46.92 VAGINAL BLEEDING IN PREGNANCY, SECOND TRIMESTER: Status: ACTIVE | Noted: 2022-02-22

## 2022-02-22 LAB
ABO/RH: NORMAL
AMPHETAMINE SCREEN, URINE: NOT DETECTED
ANTIBODY IDENTIFICATION: NORMAL
ANTIBODY IDENTIFICATION: NORMAL
ANTIBODY SCREEN: NORMAL
BARBITURATE SCREEN URINE: NOT DETECTED
BENZODIAZEPINE SCREEN, URINE: NOT DETECTED
CANNABINOID SCREEN URINE: POSITIVE
COCAINE METABOLITE SCREEN URINE: NOT DETECTED
DAT POLYSPECIFIC: NORMAL
DR. NOTIFY: NORMAL
FENTANYL SCREEN, URINE: NOT DETECTED
HCT VFR BLD CALC: 35.9 % (ref 34–48)
HEMOGLOBIN: 11.8 G/DL (ref 11.5–15.5)
Lab: ABNORMAL
MCH RBC QN AUTO: 28.8 PG (ref 26–35)
MCHC RBC AUTO-ENTMCNC: 32.9 % (ref 32–34.5)
MCV RBC AUTO: 87.6 FL (ref 80–99.9)
METHADONE SCREEN, URINE: NOT DETECTED
OPIATE SCREEN URINE: NOT DETECTED
OXYCODONE URINE: NOT DETECTED
PDW BLD-RTO: 13.4 FL (ref 11.5–15)
PHENCYCLIDINE SCREEN URINE: NOT DETECTED
PLATELET # BLD: 255 E9/L (ref 130–450)
PMV BLD AUTO: 9.6 FL (ref 7–12)
RBC # BLD: 4.1 E12/L (ref 3.5–5.5)
WBC # BLD: 6.4 E9/L (ref 4.5–11.5)

## 2022-02-22 PROCEDURE — 86922 COMPATIBILITY TEST ANTIGLOB: CPT

## 2022-02-22 PROCEDURE — 76821 MIDDLE CEREBRAL ARTERY ECHO: CPT | Performed by: OBSTETRICS & GYNECOLOGY

## 2022-02-22 PROCEDURE — 80307 DRUG TEST PRSMV CHEM ANLYZR: CPT

## 2022-02-22 PROCEDURE — 1220000001 HC SEMI PRIVATE L&D R&B

## 2022-02-22 PROCEDURE — 76819 FETAL BIOPHYS PROFIL W/O NST: CPT | Performed by: OBSTETRICS & GYNECOLOGY

## 2022-02-22 PROCEDURE — 85027 COMPLETE CBC AUTOMATED: CPT

## 2022-02-22 PROCEDURE — 96372 THER/PROPH/DIAG INJ SC/IM: CPT

## 2022-02-22 PROCEDURE — 2580000003 HC RX 258: Performed by: OBSTETRICS & GYNECOLOGY

## 2022-02-22 PROCEDURE — G0378 HOSPITAL OBSERVATION PER HR: HCPCS

## 2022-02-22 PROCEDURE — 86900 BLOOD TYPING SEROLOGIC ABO: CPT

## 2022-02-22 PROCEDURE — 6370000000 HC RX 637 (ALT 250 FOR IP): Performed by: OBSTETRICS & GYNECOLOGY

## 2022-02-22 PROCEDURE — 86901 BLOOD TYPING SEROLOGIC RH(D): CPT

## 2022-02-22 PROCEDURE — 86870 RBC ANTIBODY IDENTIFICATION: CPT

## 2022-02-22 PROCEDURE — 86880 COOMBS TEST DIRECT: CPT

## 2022-02-22 PROCEDURE — 6360000002 HC RX W HCPCS: Performed by: OBSTETRICS & GYNECOLOGY

## 2022-02-22 PROCEDURE — 86850 RBC ANTIBODY SCREEN: CPT

## 2022-02-22 PROCEDURE — 36415 COLL VENOUS BLD VENIPUNCTURE: CPT

## 2022-02-22 PROCEDURE — 99214 OFFICE O/P EST MOD 30 MIN: CPT | Performed by: PHYSICIAN ASSISTANT

## 2022-02-22 PROCEDURE — 96374 THER/PROPH/DIAG INJ IV PUSH: CPT

## 2022-02-22 PROCEDURE — 76815 OB US LIMITED FETUS(S): CPT | Performed by: OBSTETRICS & GYNECOLOGY

## 2022-02-22 PROCEDURE — 76816 OB US FOLLOW-UP PER FETUS: CPT | Performed by: OBSTETRICS & GYNECOLOGY

## 2022-02-22 PROCEDURE — 76820 UMBILICAL ARTERY ECHO: CPT | Performed by: OBSTETRICS & GYNECOLOGY

## 2022-02-22 RX ORDER — ZOLPIDEM TARTRATE 5 MG/1
5 TABLET ORAL NIGHTLY PRN
Status: DISCONTINUED | OUTPATIENT
Start: 2022-02-22 | End: 2022-02-23 | Stop reason: HOSPADM

## 2022-02-22 RX ORDER — SODIUM CHLORIDE, SODIUM LACTATE, POTASSIUM CHLORIDE, CALCIUM CHLORIDE 600; 310; 30; 20 MG/100ML; MG/100ML; MG/100ML; MG/100ML
INJECTION, SOLUTION INTRAVENOUS CONTINUOUS
Status: DISCONTINUED | OUTPATIENT
Start: 2022-02-22 | End: 2022-02-23 | Stop reason: HOSPADM

## 2022-02-22 RX ORDER — ZOLPIDEM TARTRATE 5 MG/1
TABLET ORAL
Status: DISCONTINUED
Start: 2022-02-22 | End: 2022-02-22 | Stop reason: WASHOUT

## 2022-02-22 RX ORDER — IBUPROFEN 600 MG/1
600 TABLET ORAL EVERY 6 HOURS
Status: DISCONTINUED | OUTPATIENT
Start: 2022-02-22 | End: 2022-02-23 | Stop reason: HOSPADM

## 2022-02-22 RX ORDER — KETOROLAC TROMETHAMINE 30 MG/ML
30 INJECTION, SOLUTION INTRAMUSCULAR; INTRAVENOUS ONCE
Status: COMPLETED | OUTPATIENT
Start: 2022-02-22 | End: 2022-02-22

## 2022-02-22 RX ORDER — SODIUM CHLORIDE 0.9 % (FLUSH) 0.9 %
5-40 SYRINGE (ML) INJECTION PRN
Status: DISCONTINUED | OUTPATIENT
Start: 2022-02-22 | End: 2022-02-23 | Stop reason: HOSPADM

## 2022-02-22 RX ORDER — SODIUM CHLORIDE 0.9 % (FLUSH) 0.9 %
5-40 SYRINGE (ML) INJECTION EVERY 12 HOURS SCHEDULED
Status: DISCONTINUED | OUTPATIENT
Start: 2022-02-22 | End: 2022-02-23 | Stop reason: HOSPADM

## 2022-02-22 RX ORDER — SODIUM CHLORIDE 9 MG/ML
25 INJECTION, SOLUTION INTRAVENOUS PRN
Status: DISCONTINUED | OUTPATIENT
Start: 2022-02-22 | End: 2022-02-23 | Stop reason: HOSPADM

## 2022-02-22 RX ORDER — DOCUSATE SODIUM 100 MG/1
100 CAPSULE, LIQUID FILLED ORAL 2 TIMES DAILY
Status: DISCONTINUED | OUTPATIENT
Start: 2022-02-22 | End: 2022-02-23 | Stop reason: HOSPADM

## 2022-02-22 RX ORDER — ACETAMINOPHEN 325 MG/1
650 TABLET ORAL EVERY 4 HOURS PRN
Status: DISCONTINUED | OUTPATIENT
Start: 2022-02-22 | End: 2022-02-23 | Stop reason: HOSPADM

## 2022-02-22 RX ORDER — ONDANSETRON 2 MG/ML
4 INJECTION INTRAMUSCULAR; INTRAVENOUS EVERY 6 HOURS PRN
Status: DISCONTINUED | OUTPATIENT
Start: 2022-02-22 | End: 2022-02-23 | Stop reason: HOSPADM

## 2022-02-22 RX ORDER — TERBUTALINE SULFATE 1 MG/ML
0.25 INJECTION, SOLUTION SUBCUTANEOUS ONCE
Status: COMPLETED | OUTPATIENT
Start: 2022-02-22 | End: 2022-02-22

## 2022-02-22 RX ORDER — SODIUM CHLORIDE, SODIUM LACTATE, POTASSIUM CHLORIDE, AND CALCIUM CHLORIDE .6; .31; .03; .02 G/100ML; G/100ML; G/100ML; G/100ML
1000 INJECTION, SOLUTION INTRAVENOUS PRN
Status: DISCONTINUED | OUTPATIENT
Start: 2022-02-22 | End: 2022-02-23 | Stop reason: HOSPADM

## 2022-02-22 RX ORDER — SODIUM CHLORIDE, SODIUM LACTATE, POTASSIUM CHLORIDE, AND CALCIUM CHLORIDE .6; .31; .03; .02 G/100ML; G/100ML; G/100ML; G/100ML
500 INJECTION, SOLUTION INTRAVENOUS PRN
Status: DISCONTINUED | OUTPATIENT
Start: 2022-02-22 | End: 2022-02-23 | Stop reason: HOSPADM

## 2022-02-22 RX ADMIN — TERBUTALINE SULFATE 0.25 MG: 1 INJECTION SUBCUTANEOUS at 12:48

## 2022-02-22 RX ADMIN — IBUPROFEN 600 MG: 600 TABLET ORAL at 17:43

## 2022-02-22 RX ADMIN — KETOROLAC TROMETHAMINE 30 MG: 30 INJECTION, SOLUTION INTRAMUSCULAR; INTRAVENOUS at 11:57

## 2022-02-22 RX ADMIN — SODIUM CHLORIDE, POTASSIUM CHLORIDE, SODIUM LACTATE AND CALCIUM CHLORIDE: 600; 310; 30; 20 INJECTION, SOLUTION INTRAVENOUS at 10:55

## 2022-02-22 ASSESSMENT — PAIN SCALES - GENERAL
PAINLEVEL_OUTOF10: 9
PAINLEVEL_OUTOF10: 10

## 2022-02-22 NOTE — PROGRESS NOTES
Dr. Lianne Mejia called and updated of pt status and pt request to speak to him. Dr. Lianne Mejia stated pt can speak to Dr. Audery Goldberg instead. Dr. Lianne Mejia stated to ask Dr. Audery Goldberg if terbutaline is indicated.

## 2022-02-22 NOTE — PROGRESS NOTES
D/w patient MFM recommendations and risks to patient and mother f if bleeding increases or if a larger abruption occurs. Patient insists on going home, believes she can be back immediately if her bleeding recurs or feels worsening pain. Requests to sign AMA forms.

## 2022-02-22 NOTE — PROGRESS NOTES
Patient requesting to sign out AMA. Dr Triny Baldwin discussed Risks with patient as well as nurse. Patient encouraged to not go home d/t ctx on monitor and because she is still having a small amount of vaginal bleeding. Patient still wants to sign out.

## 2022-02-22 NOTE — PROGRESS NOTES
Dr. Eleazar Ocampo called to update on plan of care. Predicts imminent delivery within the next 24-48hr based on the change made from the previous 24 hr.  Pt may have ice cream and clear liquids and a sleep aid.  FHT q shift

## 2022-02-22 NOTE — PROGRESS NOTES
Dr Camilo Valdez updated on what Dr Dary Adan said in phone call.  Greg Martin he will go in and talk with patient

## 2022-02-22 NOTE — PROGRESS NOTES
Care of patient taken over. Patient comfortable in bed. Said she is still spotting about the size of a dime in her underwear. CTX being picked up on monitor but are not felt by patient. Patient denies leaking of fluid. Spot check done and heart tones in the 150s.

## 2022-02-22 NOTE — PROGRESS NOTES
Dr. Nahid Villafuerte called to receive update on plan of care for patient. He stated he will call back in 20-30 min.

## 2022-02-22 NOTE — PROGRESS NOTES
Dr. Ángela Santos updated on pt status, VB, contractions, and new + antibody results. Dr. Ángela Santos ordered terbutaline and stated he will be here @ [facility] to assess pt in 30-45 minutes.

## 2022-02-22 NOTE — H&P
Department of Obstetrics and Gynecology  Physician Assistant Obstetrics History and Physical      HISTORY OF PRESENT ILLNESS:      The patient is a 27 y.o.  6 parity 2213 at 24 weeks' 4 days' gestation presents to L&D from the home due to abdominal pain, contractions, and vaginal bleeding. Here yesterday with the same complaints and left AMA. States had a small amount of bleeding on her panty liner this morning. She is feeling contractions every 5 to 6 minutes, whereas yesterday she was not feeling contractions that were noted as Q 4 - 10 minutes.     Current obstetric history is significant for:  History of  delivery  Previous  section  Short cervix  Cervical funneling  History pf placental abruption in previous pregnancy   death in prior pregnancy  Continuing pregnancy after intrauterine death of one twin  Dichorionic diamniotic twin pregnancy  Covid 23 in first trimester      Estimated Due Date:  2022  Contractions: Yes  Leaking of fluid: No  Bleeding:  Yes  Perceived fetal movement: Good          PAST OB HISTORY:  OB History    Para Term  AB Living   6 4 2 2 1 3   SAB IAB Ectopic Molar Multiple Live Births   1       0 4      # Outcome Date GA Lbr Oziel/2nd Weight Sex Delivery Anes PTL Lv   6 Current            5  05/15/19 35w1d / 00:17 4 lb 10.1 oz (2.1 kg) F Vag-Spont None Y JESSICA      Complications: Abruptio Placenta   4 SAB  8w0d          3 Term 17 37w0d  5 lb 11 oz (2.58 kg) M  EPI N JESSICA   2  13 23w6d 02:55 1 lb 10.1 oz (0.74 kg) M CS-LTranv  Y ND      Birth Comments: 3 wk nicu, passed after 3 wks   1 Term 01/31/10 37w0d  5 lb 1 oz (2.296 kg) M Vag-Spont None N JESSICA           Pre-eclampsia:  No      :  Yes       D & C:  Yes      Cerclage:  No      LEEP:  No      Myomectomy:  No       Labor: Yes    Past Medical History:  Denies hypertension, diabetes, asthma, cardiac or thyroid disease  Diagnosis Date    MDD (major depressive disorder) 2015    STD (female)         Past Surgical History:    Procedure Laterality Date     SECTION  2013    DILATION AND CURETTAGE OF UTERUS  2018        Social History:    Reports that she quit smoking about 7 months ago. Her smoking use included cigars. She has a 8.00 pack-year smoking history. She has never used smokeless tobacco. She reports that she does not drink alcohol and does not use drugs. Medications Prior to Admission:  Medications Prior to Admission: HYDROXYprogesterone caproate 250 MG/ML OIL oil injection, Inject 1 mL into the muscle every 7 days  progesterone (PROMETRIUM) 200 MG CAPS capsule, Take 1 capsule by mouth nightly  aspirin EC 81 MG EC tablet, Take 2 tablets by mouth daily  ferrous sulfate 325 (65 Fe) MG tablet, Take 1 tablet by mouth 2 times daily (with meals)  folic acid (FOLVITE) 1 MG tablet, take 1 tablet by mouth three times a day  Prenatal MV-Min-Fe Fum-FA-DHA (PRENATAL 1 PO), Take by mouth  acetaminophen (TYLENOL) 500 MG tablet, Take 2 tablets by mouth every 6 hours as needed for Pain (Patient taking differently: Take 1,000 mg by mouth every 6 hours as needed for Pain NOT taking)    Allergies:  Pcn [penicillins]    ROS:  Const: No fever, chills, night sweats, no recent unexplained weight gain/loss  HEENT: No blurred vision, double vision; no ear problems; no sore throat, congestion; no running nose. Resp: No cough, no sputum, no pleuritic chest pain, no sob  Cardio: No chest pain, no exertional dyspnea, no PND, no orthopnea, no palpitation, no leg swelling. GI: No dysphagia, no reflux; no abdominal pain, no n/v; no c/d.  No hematochezia    : No dysuria, no frequency, hesitancy; no hematuria  MSK: no joint pain, no myalgia, no change in ROM  Neuro: no focal weakness in extremities, no slurred speech, no double vision, no numbness or tingling in extremities  Endo: no heat/cold intolerance, no polyphagia, polydipsia or polyuria  Hem: no increased bleeding, no bruising, no lymphadenopathy  Skin: no skin changes  Psych: no depressed mood, no suicidal ideation      PHYSICAL EXAM:  /80   Pulse 99   Temp 97.8 °F (36.6 °C) (Oral)   LMP 2021     General appearance: Comfortable  Lungs:  CTA bilaterally, good excursion  Heart:  Regular Rate & Rhythm, no murmur noted  Abdomen:  Soft, non-tender, gravid  Uterus: soft, nontender  Fetal heart tones:  140's  Cervix: SSE; blood streak noted at cervical os extending to 6 o' clock position; SVE: 1 cm  Contraction frequency: occasional  Membranes:  Intact  Extremities: (-) edema       ASSESSMENT:   28 yo  IUP at 21 weeks' 4 days' gestation   contractions, vaginal bleeding  Dichorionic diamniotic twin gestation with demise of twin B  Short cervix with funneling  History of PTD with incompetent cervix  History of placental abruption previous pregnancy  Previous  section     Threatened  labor    SVE; Cervix: 1 cm  SSE: blood streak at cervical os  Contractions: occasional         Plan: I discussed above with Dr. Lorenzo Valderrama. Orders per him:  EFM  Observation  MFM consult- Dr Eladio Driscoll notified at 10:20    Orders per Dr. Eladio Driscoll:   Toradol 30 mg IV x 1 now, followed in 6 hours Motrin 600 mg Q 6 hours  CBC  Type & Screen   UDS    Electronically signed by Audrey Barragan PA-C on 2022 at 9:14 AM

## 2022-02-22 NOTE — PROGRESS NOTES
Dr Yareli Dugan called and updated on patient. States cervix was the same on exam on ultrasound today. States patient should not go home if still bleeding and rodolfo.

## 2022-02-22 NOTE — PROGRESS NOTES
Dr. Rizo Furnbrandon in pt room to discuss pt concerns per pt request. Pt requests to speak to Dr. Lydia Barrera.

## 2022-02-22 NOTE — PROGRESS NOTES
Maternal Fetal Medicine update   See US report  NO PROM, sepsis , distress  Marginal hematoma seen-  stable from earlier exam  Short cervix -   Reports bleeding - small amt  Cramping better - still comes and goes  Recommend expectant mgmt - anticipate this will stop bleeding and cramping     If this worsens , Try NSAIDS / indocin/ toradol  Still  Remote from viability -     Utility of progesterone with cramping  and bleeding is unclear -    Will scan / follow up tomorrow

## 2022-02-23 VITALS
OXYGEN SATURATION: 100 % | SYSTOLIC BLOOD PRESSURE: 112 MMHG | TEMPERATURE: 98.2 F | HEART RATE: 68 BPM | RESPIRATION RATE: 12 BRPM | DIASTOLIC BLOOD PRESSURE: 82 MMHG

## 2022-02-23 PROCEDURE — 88305 TISSUE EXAM BY PATHOLOGIST: CPT

## 2022-02-23 PROCEDURE — 7200000001 HC VAGINAL DELIVERY

## 2022-02-23 PROCEDURE — 6360000002 HC RX W HCPCS: Performed by: OBSTETRICS & GYNECOLOGY

## 2022-02-23 PROCEDURE — 6370000000 HC RX 637 (ALT 250 FOR IP): Performed by: OBSTETRICS & GYNECOLOGY

## 2022-02-23 PROCEDURE — 59409 OBSTETRICAL CARE: CPT | Performed by: MIDWIFE

## 2022-02-23 PROCEDURE — 99238 HOSP IP/OBS DSCHRG MGMT 30/<: CPT | Performed by: PHYSICIAN ASSISTANT

## 2022-02-23 PROCEDURE — 6360000002 HC RX W HCPCS

## 2022-02-23 RX ORDER — SODIUM CHLORIDE 9 MG/ML
25 INJECTION, SOLUTION INTRAVENOUS PRN
Status: DISCONTINUED | OUTPATIENT
Start: 2022-02-23 | End: 2022-02-23 | Stop reason: HOSPADM

## 2022-02-23 RX ORDER — IBUPROFEN 600 MG/1
600 TABLET ORAL EVERY 6 HOURS
Qty: 30 TABLET | Refills: 0 | Status: SHIPPED | OUTPATIENT
Start: 2022-02-23

## 2022-02-23 RX ORDER — HYDROCODONE BITARTRATE AND ACETAMINOPHEN 5; 325 MG/1; MG/1
TABLET ORAL
Status: DISCONTINUED
Start: 2022-02-23 | End: 2022-02-23 | Stop reason: HOSPADM

## 2022-02-23 RX ORDER — HYDROCODONE BITARTRATE AND ACETAMINOPHEN 5; 325 MG/1; MG/1
1 TABLET ORAL ONCE
Status: COMPLETED | OUTPATIENT
Start: 2022-02-23 | End: 2022-02-23

## 2022-02-23 RX ADMIN — Medication 166.7 ML: at 01:28

## 2022-02-23 RX ADMIN — BUTORPHANOL TARTRATE 1 MG: 2 INJECTION, SOLUTION INTRAMUSCULAR; INTRAVENOUS at 00:06

## 2022-02-23 RX ADMIN — HYDROCODONE BITARTRATE AND ACETAMINOPHEN 1 TABLET: 5; 325 TABLET ORAL at 04:09

## 2022-02-23 ASSESSMENT — PAIN SCALES - GENERAL
PAINLEVEL_OUTOF10: 10
PAINLEVEL_OUTOF10: 10
PAINLEVEL_OUTOF10: 0
PAINLEVEL_OUTOF10: 0
PAINLEVEL_OUTOF10: 10

## 2022-02-23 NOTE — PROGRESS NOTES
Spoke with Dr. Mynor Betancur,   Notified him that patient is refusing everything, eating against advisement and education, only wanting to speak with a physician. Dr. Mynor Betancur transferred to patient's room phone.

## 2022-02-23 NOTE — L&D DELIVERY NOTE
Department of Obstetrics and Gynecology  Spontaneous Vaginal Delivery Note      Pre-operative Diagnosis:  IUP @ 21 5/7 week gestation, Dichorionic, diamniotic twin with demist of twin @ 8 weeks, Cervical funneling  Post-operative Diagnosis:  Living  infant(s) and Male    Infant Wt: Birth Weight: N/A       APGARS:     APGAR One: N/A  APGAR Five: N/A       Anesthesia:  none      Delivery Summary: Spontaneous vaginal delivery of a male baby in at  01:08 prior to entering room. [place] with heartbeat at time of delivery  Placenta and menbranes, complete and normal were delivered at 01:28. Perineum: intact  Mother and Baby are in good condition. Consult and call placed to Dr. Ok Antunez due to families concerns for management of care comparing the care of this delivery to that of her previous 23 week delivery. Declines consult for \"nonviable fetus\". Stated Herlinda Kaplan has been educated by multiple doctors\". Specimen:  placenta     Estimated blood loss: 200 cc              Condition:  mother stable holding     Blood Type and Rh: O POS        Rubella Immunity Status:   Immune          Attending Attestation: I was present and scrubbed for the entire procedure.

## 2022-02-23 NOTE — PROGRESS NOTES
2202: Brought patient a sleep aid as requested. Patient states she isn't sure if she wants Sammuel Bores anymore as she wants to go to different hospital, patient upset and crying. Patient consoled. Patient requesting 2nd dose of turb, states spoke to Dr. Miladys Harvey and he will not order it. Notified patient we would need to monitor contractions to give turb. Encouraged patient to allow me to place toco monitor so we can monitor contractions. patient agrees. 2212:Patient calls out stating she feels baby is getting ready to come. Hayder Sandoval in room to check patient. Patient dilated with bulging bag.  2226: moving patient to LD1 for delivery.

## 2022-02-23 NOTE — PROGRESS NOTES
Asked patient if I could check her vitals states \"maybe after I eat. \" Educated patient that she only has an order for clear liquids. States she knows and that's why she would rather go down and  her food, educated patient that its against policy for patient to go outside with IV in. Notified patient I will  her food for her from entrance. Advised patient again about not eating solids per order, patient refused education.

## 2022-02-23 NOTE — PROGRESS NOTES
Patient uncomfortable and asking for pain meds. Dr Yuly Toth called and okay for 1mg of stadol to be given.

## 2022-02-23 NOTE — PROGRESS NOTES
of a baby boy @ 18. Apgars . Baby 21 weeks and 5 days, comfort care provided. Baby handed to mother.

## 2022-02-23 NOTE — PROGRESS NOTES
Pt called out, this RN entered room and pt pacing in room stating \"Are you going to take my IV out or am I, I'm leaving. \" This RN educated the patient that IV would stay in until time of discharge and there were choices left for her to make in regards to the infant's disposition before discharge. States she will write the  home down, her mother is waiting downstairs and she is leaving now. This RN asked if the patient could wait for discharge instructions to be printed and gone through, pt refused. This RN provided pt with paper stating follow up appointment scheduled at the clinic with Dr. Silverio Vang on  @  [phone]. Provided with ibuprofen script from meds to beds. Photos, SD card, memory box, hand/foot prints, name card provided to pt. Multiple keepsakes in cooling cot with pt, instructed that she is welcome to take them with her, pt states \"Get out\".

## 2022-02-23 NOTE — CARE COORDINATION
SW Discharge Planning     SW was unable to see mother due to mother not wanting to be disturbed     Electronically signed by ARLENE Ruiz on 2/23/2022 at 2:09 PM

## 2022-02-23 NOTE — PROGRESS NOTES
Spoke with Dr. Sander Xie at nurse's station in regards to patient. States as long as there are no concerns pt will be able to discharge today. Poornima Santos NP to see pt at this time to determine.

## 2022-02-23 NOTE — PROGRESS NOTES
Asked if patient would allow nurse to spot check heart tones. Patient denies check, says she is feeling baby move.

## 2022-02-23 NOTE — PROGRESS NOTES
CLINICAL PHARMACY NOTE: MEDS TO BEDS    Total # of Prescriptions Filled: 1   The following medications were delivered to the patient:  · Ibuprofen 600    Additional Documentation:

## 2022-02-23 NOTE — PROGRESS NOTES
Dr Page Martinez at bedside to exam patient. Patient states she will not let him check her and that she will kick him in the face if he tries.  Dr Page Martinez out of room and talking with [employer] Dr Arsh Meléndez

## 2022-02-23 NOTE — PROGRESS NOTES
SUBJECTIVE:  Patient without complaint. Requesting to go home. Denies cramping or abdominal pain. I offered condolences; I have seen her over the past 3 days. Asked about depression, she states \"I am ok\". Denies SI or HI. Declined anti-depressant. Reports [facility] if she develops depression. Normal lochia, denies passing any clots. Baby in cuddle cot next to patient bed. OBJECTIVE:  /66   Pulse 60   Temp 98 °F (36.7 °C) (Oral)   Resp 20   LMP 2021   SpO2 100%      Lab Results   Component Value Date    WBC 6.4 2022    HGB 11.8 2022    HCT 35.9 2022    MCV 87.6 2022     2022     Physical Exam:  General: comfortable  Abdomen: soft, nontender    Lochia normal rubra   Uterus firm, nontender  Extremities: (-) edema (-) calf tenderness    ASSESSMENT/PLAN: 26 yo  21w5d s/p  at 01:08 with demise due to incompetent cervix/ short cervix with funneling noted 2022. Patient declined cervical cerclage 2022. Pregnancy complicated by vaginal bleeding, subchorionic hematoma, positive antibodies: anti-E and anti-IH (patiend denies history of blood transfusion), history of  delivery, originally dichorionic diamniotic pregnancy with IUFD of twin B at 8 weeks (retained), Covid 19 infection in first trimester, and marijuana use (+) UDS on admission . Postpartum Day # 0  Advance care  OK for discharge per Dr. Mary Suarez in [facility] in 2 weeks on 2022 at 12:45 with Dr. Pascale Crum, sooner if signs and or symptoms of depression, declined anti-depressant, states she will call and be seen sooner if s/s of depression occur  Script for motrin 600 mg po Q 6 hr prn cramping and/or pain  Unisom OTC  Discussed precautions: call if fever, heavy bleeding, fast heart rate, sore or painful uterus, worsening abdominal pain, foul smelling vaginal discharge, sweating, shortness of breath, calf tenderness.  She voiced an understanding.       Gregorio Coe PA-C 2/23/2022 11:10 AM

## 2022-02-23 NOTE — PROGRESS NOTES
Kamila RN at bedside with Shamika Lantigua RN listening to patient, and answering all questions. Patient advocate number given.

## 2022-02-23 NOTE — PROGRESS NOTES
Patient states she would not like pastoral care at this time. States she does not want to answer any questions at this time so she can sleep.

## 2022-02-23 NOTE — PROGRESS NOTES
Orders received that patient will be a house patient and to call Melvin Blackman for delivery, as Dr Eriberto Worthington will be her back up.

## 2022-02-23 NOTE — PROGRESS NOTES
Assumed care of pt. Pt resting comfortably in bed with infant in cooling cot at bedside. Denies any pain at this time. Call light within reach, VSS.

## 2022-02-23 NOTE — PROGRESS NOTES
Patient checked by house provider, patient moved to LD1 for imminent delivery. Dr Hank Link made aware. Patient High Dwayne Alfonso 668 upon entering LD1, VE still consistent with imminent delivery.

## 2022-02-23 NOTE — PROGRESS NOTES
Patients sister in room. Patient and her sister upset about the care that she has been receiving. The sister does not feel like we have done everything we can for her sisters baby.

## 2022-02-23 NOTE — PROGRESS NOTES
Spoke with Dr. Keisha Carrasco,   New order for sleep aid obtained. Updated him on patient's condition,   Refusing monitor, refusing vitals/assessment, eating solids. Will reassess patient's willingness for treatment.

## 2022-02-23 NOTE — PROGRESS NOTES
[address] [place] notified for release of body. On call service states they have been notified by the patient's grandmother.

## 2022-02-23 NOTE — PROGRESS NOTES
Spoke with Vanessa Santana NP at [facility]. States she will order motrin and prenatal for patient and to have meds to beds set up for delivery of these scripts before patient discharge. States to schedule pt appt with the clinic for 2 weeks.

## 2022-02-25 LAB
BLOOD BANK DISPENSE STATUS: NORMAL
BLOOD BANK PRODUCT CODE: NORMAL
BPU ID: NORMAL
DESCRIPTION BLOOD BANK: NORMAL

## 2022-02-26 ENCOUNTER — APPOINTMENT (OUTPATIENT)
Dept: ULTRASOUND IMAGING | Age: 27
End: 2022-02-26
Payer: COMMERCIAL

## 2022-02-26 ENCOUNTER — HOSPITAL ENCOUNTER (EMERGENCY)
Age: 27
Discharge: HOME | End: 2022-02-26
Attending: STUDENT IN AN ORGANIZED HEALTH CARE EDUCATION/TRAINING PROGRAM
Payer: COMMERCIAL

## 2022-02-26 VITALS
HEART RATE: 56 BPM | TEMPERATURE: 98.7 F | WEIGHT: 110 LBS | RESPIRATION RATE: 16 BRPM | SYSTOLIC BLOOD PRESSURE: 129 MMHG | DIASTOLIC BLOOD PRESSURE: 79 MMHG | OXYGEN SATURATION: 100 % | BODY MASS INDEX: 20.24 KG/M2 | HEIGHT: 62 IN

## 2022-02-26 DIAGNOSIS — R52 POSTPARTUM PAIN: Primary | ICD-10-CM

## 2022-02-26 LAB
ABO/RH: NORMAL
ALBUMIN SERPL-MCNC: 3.9 G/DL (ref 3.5–5.2)
ALP BLD-CCNC: 87 U/L (ref 35–104)
ALT SERPL-CCNC: 16 U/L (ref 0–32)
ANION GAP SERPL CALCULATED.3IONS-SCNC: 10 MMOL/L (ref 7–16)
ANTIBODY IDENTIFICATION: NORMAL
ANTIBODY IDENTIFICATION: NORMAL
ANTIBODY SCREEN: NORMAL
AST SERPL-CCNC: 22 U/L (ref 0–31)
BACTERIA: ABNORMAL /HPF
BASOPHILS ABSOLUTE: 0.01 E9/L (ref 0–0.2)
BASOPHILS RELATIVE PERCENT: 0.2 % (ref 0–2)
BILIRUB SERPL-MCNC: <0.2 MG/DL (ref 0–1.2)
BILIRUBIN URINE: NEGATIVE
BLOOD, URINE: ABNORMAL
BUN BLDV-MCNC: 7 MG/DL (ref 6–20)
CALCIUM SERPL-MCNC: 9.3 MG/DL (ref 8.6–10.2)
CHLORIDE BLD-SCNC: 104 MMOL/L (ref 98–107)
CLARITY: ABNORMAL
CO2: 24 MMOL/L (ref 22–29)
COLOR: YELLOW
CREAT SERPL-MCNC: 0.6 MG/DL (ref 0.5–1)
DAT POLYSPECIFIC: NORMAL
DR. NOTIFY: NORMAL
EOSINOPHILS ABSOLUTE: 0.02 E9/L (ref 0.05–0.5)
EOSINOPHILS RELATIVE PERCENT: 0.4 % (ref 0–6)
GFR AFRICAN AMERICAN: >60
GFR NON-AFRICAN AMERICAN: >60 ML/MIN/1.73
GLUCOSE BLD-MCNC: 94 MG/DL (ref 74–99)
GLUCOSE URINE: NEGATIVE MG/DL
HCT VFR BLD CALC: 34.2 % (ref 34–48)
HEMOGLOBIN: 11.1 G/DL (ref 11.5–15.5)
IMMATURE GRANULOCYTES #: 0.01 E9/L
IMMATURE GRANULOCYTES %: 0.2 % (ref 0–5)
KETONES, URINE: ABNORMAL MG/DL
LEUKOCYTE ESTERASE, URINE: ABNORMAL
LYMPHOCYTES ABSOLUTE: 1.41 E9/L (ref 1.5–4)
LYMPHOCYTES RELATIVE PERCENT: 30 % (ref 20–42)
MCH RBC QN AUTO: 29.1 PG (ref 26–35)
MCHC RBC AUTO-ENTMCNC: 32.5 % (ref 32–34.5)
MCV RBC AUTO: 89.8 FL (ref 80–99.9)
MONOCYTES ABSOLUTE: 0.37 E9/L (ref 0.1–0.95)
MONOCYTES RELATIVE PERCENT: 7.9 % (ref 2–12)
NEUTROPHILS ABSOLUTE: 2.88 E9/L (ref 1.8–7.3)
NEUTROPHILS RELATIVE PERCENT: 61.3 % (ref 43–80)
NITRITE, URINE: NEGATIVE
PDW BLD-RTO: 13.6 FL (ref 11.5–15)
PH UA: 6 (ref 5–9)
PLATELET # BLD: 280 E9/L (ref 130–450)
PMV BLD AUTO: 9.1 FL (ref 7–12)
POTASSIUM SERPL-SCNC: 4.8 MMOL/L (ref 3.5–5)
PROTEIN UA: ABNORMAL MG/DL
RBC # BLD: 3.81 E12/L (ref 3.5–5.5)
RBC UA: ABNORMAL /HPF (ref 0–2)
SODIUM BLD-SCNC: 138 MMOL/L (ref 132–146)
SPECIFIC GRAVITY UA: >=1.03 (ref 1–1.03)
TOTAL PROTEIN: 7.5 G/DL (ref 6.4–8.3)
UROBILINOGEN, URINE: 0.2 E.U./DL
WBC # BLD: 4.7 E9/L (ref 4.5–11.5)
WBC UA: ABNORMAL /HPF (ref 0–5)

## 2022-02-26 PROCEDURE — 86900 BLOOD TYPING SEROLOGIC ABO: CPT

## 2022-02-26 PROCEDURE — 86850 RBC ANTIBODY SCREEN: CPT

## 2022-02-26 PROCEDURE — 85025 COMPLETE CBC W/AUTO DIFF WBC: CPT

## 2022-02-26 PROCEDURE — 86901 BLOOD TYPING SEROLOGIC RH(D): CPT

## 2022-02-26 PROCEDURE — 76856 US EXAM PELVIC COMPLETE: CPT

## 2022-02-26 PROCEDURE — 6360000002 HC RX W HCPCS: Performed by: EMERGENCY MEDICINE

## 2022-02-26 PROCEDURE — 80053 COMPREHEN METABOLIC PANEL: CPT

## 2022-02-26 PROCEDURE — 6370000000 HC RX 637 (ALT 250 FOR IP): Performed by: EMERGENCY MEDICINE

## 2022-02-26 PROCEDURE — 86922 COMPATIBILITY TEST ANTIGLOB: CPT

## 2022-02-26 PROCEDURE — 86870 RBC ANTIBODY IDENTIFICATION: CPT

## 2022-02-26 PROCEDURE — 86880 COOMBS TEST DIRECT: CPT

## 2022-02-26 PROCEDURE — 99284 EMERGENCY DEPT VISIT MOD MDM: CPT

## 2022-02-26 PROCEDURE — 81001 URINALYSIS AUTO W/SCOPE: CPT

## 2022-02-26 PROCEDURE — 96374 THER/PROPH/DIAG INJ IV PUSH: CPT

## 2022-02-26 RX ORDER — HYDROCODONE BITARTRATE AND ACETAMINOPHEN 5; 325 MG/1; MG/1
1 TABLET ORAL ONCE
Status: COMPLETED | OUTPATIENT
Start: 2022-02-26 | End: 2022-02-26

## 2022-02-26 RX ORDER — ACETAMINOPHEN 500 MG
1000 TABLET ORAL ONCE
Status: COMPLETED | OUTPATIENT
Start: 2022-02-26 | End: 2022-02-26

## 2022-02-26 RX ORDER — KETOROLAC TROMETHAMINE 30 MG/ML
15 INJECTION, SOLUTION INTRAMUSCULAR; INTRAVENOUS ONCE
Status: COMPLETED | OUTPATIENT
Start: 2022-02-26 | End: 2022-02-26

## 2022-02-26 RX ADMIN — HYDROCODONE BITARTRATE AND ACETAMINOPHEN 1 TABLET: 5; 325 TABLET ORAL at 19:20

## 2022-02-26 RX ADMIN — ACETAMINOPHEN 1000 MG: 500 TABLET ORAL at 16:14

## 2022-02-26 RX ADMIN — KETOROLAC TROMETHAMINE 15 MG: 30 INJECTION, SOLUTION INTRAMUSCULAR at 16:15

## 2022-02-26 ASSESSMENT — ENCOUNTER SYMPTOMS
NAUSEA: 0
SHORTNESS OF BREATH: 0
SINUS PAIN: 0
ABDOMINAL PAIN: 1
DIARRHEA: 0
VOMITING: 0
EYE PAIN: 0
SORE THROAT: 0
BACK PAIN: 0

## 2022-02-26 ASSESSMENT — PAIN SCALES - GENERAL
PAINLEVEL_OUTOF10: 10

## 2022-02-26 NOTE — ED PROVIDER NOTES
Chief Complaint   Patient presents with    Postpartum Complications     reports vaginal delivery on  of 22wk fetus that did not survive. pt diagosed with placental abruption. reports vaginal bleeding, 3 pads per day and vaginal pain as well as lower abd pain       70-year-old  female with noncontributory past medical history presents today with continued abdominal pain and vaginal bleeding. On , she delivered at 22 weeks. Tylenol viable. Fetus had been a twin, the first 1 is demise of 8 weeks. She states that the full placenta had been delivered at baseline and is in her home with no complications. She says she is bleeding less than she did with her prior pregnancies, she has the abdominal pain is not getting any better. She is been using ibuprofen at home until yesterday when her mom told her it was not working that she should stop. She is not having any abnormal discharges-no bleeding, she is not lightheaded, dizzy, complaining of chest pain, shortness of breath. She did not suffer from gestational hypertension, diabetes, preeclampsia. No other acute complaints. Review of Systems   Constitutional: Negative for chills and fever. HENT: Negative for ear pain, sinus pain and sore throat. Eyes: Negative for pain. Respiratory: Negative for shortness of breath. Cardiovascular: Negative for chest pain. Gastrointestinal: Positive for abdominal pain. Negative for diarrhea, nausea and vomiting. Genitourinary: Positive for vaginal bleeding. Negative for flank pain and pelvic pain. Musculoskeletal: Negative for back pain and neck pain. Skin: Negative for rash. Neurological: Negative for seizures and headaches. Hematological: Negative for adenopathy. All other systems reviewed and are negative. Physical Exam  Vitals and nursing note reviewed. Exam conducted with a chaperone present. Constitutional:       General: She is not in acute distress.      Appearance: She is well-developed. HENT:      Head: Normocephalic and atraumatic. Right Ear: External ear normal.      Left Ear: External ear normal.      Nose: Nose normal.      Mouth/Throat:      Mouth: Mucous membranes are moist.      Pharynx: Oropharynx is clear. Eyes:      Pupils: Pupils are equal, round, and reactive to light. Cardiovascular:      Rate and Rhythm: Normal rate and regular rhythm. Heart sounds: Normal heart sounds. No murmur heard. Pulmonary:      Effort: Pulmonary effort is normal. No respiratory distress. Breath sounds: Normal breath sounds. No wheezing. Abdominal:      General: Bowel sounds are normal.      Palpations: Abdomen is soft. Tenderness: There is abdominal tenderness. There is no guarding or rebound. Genitourinary:     Exam position: Supine. Pubic Area: No rash. Davie stage (genital): 5. Labia:         Right: No tenderness. Left: No tenderness. Urethra: No prolapse. Vagina: Bleeding present. Cervix: Normal.      Uterus: Normal.       Comments: Minimal blood in vaginal canal, os closed, no other discharge  Musculoskeletal:         General: No swelling. Cervical back: Normal range of motion and neck supple. Skin:     General: Skin is warm and dry. Neurological:      Mental Status: She is alert and oriented to person, place, and time. Procedures       MDM  Number of Diagnoses or Management Options  Postpartum hemorrhage, unspecified type  Postpartum pain  Diagnosis management comments: 70-year-old  female, 212 past medical history presents today with continued abdominal pain vaginal bleeding. She is hemodynamically stable on arrival to emergency department. Abdominal pain was symptomatically managed with Toradol, Tylenol. Lab work do not demonstrate evidence of anemia in the radiology abnormalities, urine without evidence of infection.   TVUS did not show any evidence for retained products of conception, pelvic exam showed minimal blood in a closed os. No other discharge. Patient was experiencing pain and was given a Norco, she said that she lost her prior OB/GYN and was given follow-up with a different one. She verbalized understanding of return precautions and symptomatic management.                  --------------------------------------------- PAST HISTORY ---------------------------------------------  Past Medical History:  has a past medical history of MDD (major depressive disorder) and STD (female). Past Surgical History:  has a past surgical history that includes Abdomen surgery;  section (2013); Dilation and curettage of uterus (2018); and Dilation & curettage. Social History:  reports that she quit smoking about 7 months ago. Her smoking use included cigars. She has a 8.00 pack-year smoking history. She has never used smokeless tobacco. She reports that she does not drink alcohol and does not use drugs. Family History: family history is not on file. The patients home medications have been reviewed.     Allergies: Pcn [penicillins]    -------------------------------------------------- RESULTS -------------------------------------------------  Labs:  Results for orders placed or performed during the hospital encounter of 22   CBC with Auto Differential   Result Value Ref Range    WBC 4.7 4.5 - 11.5 E9/L    RBC 3.81 3.50 - 5.50 E12/L    Hemoglobin 11.1 (L) 11.5 - 15.5 g/dL    Hematocrit 34.2 34.0 - 48.0 %    MCV 89.8 80.0 - 99.9 fL    MCH 29.1 26.0 - 35.0 pg    MCHC 32.5 32.0 - 34.5 %    RDW 13.6 11.5 - 15.0 fL    Platelets 622 387 - 139 E9/L    MPV 9.1 7.0 - 12.0 fL    Neutrophils % 61.3 43.0 - 80.0 %    Immature Granulocytes % 0.2 0.0 - 5.0 %    Lymphocytes % 30.0 20.0 - 42.0 %    Monocytes % 7.9 2.0 - 12.0 %    Eosinophils % 0.4 0.0 - 6.0 %    Basophils % 0.2 0.0 - 2.0 %    Neutrophils Absolute 2.88 1.80 - 7.30 E9/L    Immature Granulocytes # 0.01 E9/L Lymphocytes Absolute 1.41 (L) 1.50 - 4.00 E9/L    Monocytes Absolute 0.37 0.10 - 0.95 E9/L    Eosinophils Absolute 0.02 (L) 0.05 - 0.50 E9/L    Basophils Absolute 0.01 0.00 - 0.20 E9/L   Comprehensive Metabolic Panel   Result Value Ref Range    Sodium 138 132 - 146 mmol/L    Potassium 4.8 3.5 - 5.0 mmol/L    Chloride 104 98 - 107 mmol/L    CO2 24 22 - 29 mmol/L    Anion Gap 10 7 - 16 mmol/L    Glucose 94 74 - 99 mg/dL    BUN 7 6 - 20 mg/dL    CREATININE 0.6 0.5 - 1.0 mg/dL    GFR Non-African American >60 >=60 mL/min/1.73    GFR African American >60     Calcium 9.3 8.6 - 10.2 mg/dL    Total Protein 7.5 6.4 - 8.3 g/dL    Albumin 3.9 3.5 - 5.2 g/dL    Total Bilirubin <0.2 0.0 - 1.2 mg/dL    Alkaline Phosphatase 87 35 - 104 U/L    ALT 16 0 - 32 U/L    AST 22 0 - 31 U/L   Urinalysis with Microscopic   Result Value Ref Range    Color, UA Yellow Straw/Yellow    Clarity, UA SL CLOUDY Clear    Glucose, Ur Negative Negative mg/dL    Bilirubin Urine Negative Negative    Ketones, Urine TRACE (A) Negative mg/dL    Specific Gravity, UA >=1.030 1.005 - 1.030    Blood, Urine LARGE (A) Negative    pH, UA 6.0 5.0 - 9.0    Protein, UA TRACE Negative mg/dL    Urobilinogen, Urine 0.2 <2.0 E.U./dL    Nitrite, Urine Negative Negative    Leukocyte Esterase, Urine TRACE (A) Negative    WBC, UA 1-3 0 - 5 /HPF    RBC, UA 2-5 0 - 2 /HPF    Bacteria, UA MODERATE (A) None Seen /HPF   Doctor Notification   Result Value Ref Range    DR. BUSTOS COMPL    TYPE AND SCREEN   Result Value Ref Range    ABO/Rh O POS     Antibody Screen POS    ANTIBODY IDENTIFICATION   Result Value Ref Range    Antibody ID POS, Anti-E     Antibody ID POS, Anti-IH    DIRECT ANTIGLOBULIN TEST   Result Value Ref Range    Polyspecific Wilmer NEG    PREPARE RBC (CROSSMATCH)   Result Value Ref Range    Product Code Blood Bank [id number]     Description Blood Bank Red Blood Cells, Leuko-reduced     Unit Number [id number]     Dispense Status Blood Bank selected Radiology:  US PELVIS COMPLETE   Final Result   Unremarkable pelvic ultrasound. No evidence for retained products of   conception. The right ovary is not visualized. ------------------------- NURSING NOTES AND VITALS REVIEWED ---------------------------  Date / Time Roomed:  2/26/2022  3:16 PM  ED Bed Assignment:  24/24    The nursing notes within the ED encounter and vital signs as below have been reviewed. /79   Pulse 56   Temp 98.7 °F (37.1 °C) (Oral)   Resp 16   Ht 5' 2\" (1.575 m)   Wt 110 lb (49.9 kg)   LMP 09/08/2021   SpO2 100%   BMI 20.12 kg/m²   Oxygen Saturation Interpretation: Normal      ------------------------------------------ PROGRESS NOTES ------------------------------------------  I have spoken with the patient and discussed todays results, in addition to providing specific details for the plan of care and counseling regarding the diagnosis and prognosis. Their questions are answered at this time and they are agreeable with the plan. I discussed at length with them reasons for immediate return here for re evaluation. They will followup with primary care by calling their office tomorrow. --------------------------------- ADDITIONAL PROVIDER NOTES ---------------------------------  At this time the patient is without objective evidence of an acute process requiring hospitalization or inpatient management. They have remained hemodynamically stable throughout their entire ED visit and are stable for discharge with outpatient follow-up. The plan has been discussed in detail and they are aware of the specific conditions for emergent return, as well as the importance of follow-up. Discharge Medication List as of 2/26/2022  7:13 PM          Diagnosis:  1. Postpartum pain    2. Postpartum hemorrhage, unspecified type        Disposition:  Patient's disposition: Discharge to home  Patient's condition is stable.            Patricia Delaney, DO  Resident  02/26/22 2041

## 2022-02-28 ENCOUNTER — TELEPHONE (OUTPATIENT)
Dept: OBGYN CLINIC | Age: 27
End: 2022-02-28

## 2023-07-30 ENCOUNTER — HOSPITAL ENCOUNTER (EMERGENCY)
Age: 28
Discharge: OTHER INSTITUTION | End: 2023-07-30
Attending: EMERGENCY MEDICINE
Payer: COMMERCIAL

## 2023-07-30 VITALS
TEMPERATURE: 98.1 F | SYSTOLIC BLOOD PRESSURE: 112 MMHG | DIASTOLIC BLOOD PRESSURE: 64 MMHG | RESPIRATION RATE: 14 BRPM | OXYGEN SATURATION: 98 % | HEART RATE: 100 BPM

## 2023-07-30 DIAGNOSIS — Z59.00 HOMELESS: Primary | ICD-10-CM

## 2023-07-30 PROCEDURE — 99282 EMERGENCY DEPT VISIT SF MDM: CPT

## 2023-07-30 SDOH — ECONOMIC STABILITY - HOUSING INSECURITY: HOMELESSNESS UNSPECIFIED: Z59.00

## 2023-07-30 NOTE — DISCHARGE INSTRUCTIONS
Please go to [address] [place] [address] Street as you had already decided. There is no psychiatric issue that requires medication or inpatient treatment that should prevent you from being excepted to the mission.

## 2023-09-17 ENCOUNTER — HOSPITAL ENCOUNTER (EMERGENCY)
Age: 28
Discharge: HOME | End: 2023-09-17
Payer: COMMERCIAL

## 2023-09-17 VITALS
RESPIRATION RATE: 14 BRPM | OXYGEN SATURATION: 100 % | WEIGHT: 110 LBS | TEMPERATURE: 97.3 F | DIASTOLIC BLOOD PRESSURE: 92 MMHG | HEART RATE: 111 BPM | SYSTOLIC BLOOD PRESSURE: 137 MMHG | BODY MASS INDEX: 20.24 KG/M2 | HEIGHT: 62 IN

## 2023-09-17 DIAGNOSIS — Z20.2 STD EXPOSURE: Primary | ICD-10-CM

## 2023-09-17 LAB
HCG, URINE, POC: NEGATIVE
Lab: NORMAL
NEGATIVE QC PASS/FAIL: NORMAL
POSITIVE QC PASS/FAIL: NORMAL

## 2023-09-17 PROCEDURE — 99284 EMERGENCY DEPT VISIT MOD MDM: CPT

## 2023-09-17 PROCEDURE — 6370000000 HC RX 637 (ALT 250 FOR IP): Performed by: NURSE PRACTITIONER

## 2023-09-17 PROCEDURE — 6360000002 HC RX W HCPCS: Performed by: NURSE PRACTITIONER

## 2023-09-17 PROCEDURE — 87491 CHLMYD TRACH DNA AMP PROBE: CPT

## 2023-09-17 PROCEDURE — 87591 N.GONORRHOEAE DNA AMP PROB: CPT

## 2023-09-17 PROCEDURE — 2500000003 HC RX 250 WO HCPCS: Performed by: NURSE PRACTITIONER

## 2023-09-17 PROCEDURE — 96372 THER/PROPH/DIAG INJ SC/IM: CPT

## 2023-09-17 RX ORDER — AZITHROMYCIN 250 MG/1
1000 TABLET, FILM COATED ORAL ONCE
Status: COMPLETED | OUTPATIENT
Start: 2023-09-17 | End: 2023-09-17

## 2023-09-17 RX ORDER — METRONIDAZOLE 500 MG/1
2000 TABLET ORAL ONCE
Status: COMPLETED | OUTPATIENT
Start: 2023-09-17 | End: 2023-09-17

## 2023-09-17 RX ADMIN — LIDOCAINE HYDROCHLORIDE 500 MG: 10 INJECTION, SOLUTION EPIDURAL; INFILTRATION; INTRACAUDAL; PERINEURAL at 03:30

## 2023-09-17 RX ADMIN — AZITHROMYCIN DIHYDRATE 1000 MG: 250 TABLET, FILM COATED ORAL at 03:29

## 2023-09-17 RX ADMIN — METRONIDAZOLE 2000 MG: 500 TABLET ORAL at 03:30

## 2023-09-17 ASSESSMENT — LIFESTYLE VARIABLES: HOW OFTEN DO YOU HAVE A DRINK CONTAINING ALCOHOL: NEVER

## 2023-09-17 ASSESSMENT — PAIN - FUNCTIONAL ASSESSMENT
PAIN_FUNCTIONAL_ASSESSMENT: NONE - DENIES PAIN
PAIN_FUNCTIONAL_ASSESSMENT: NONE - DENIES PAIN

## 2023-09-17 NOTE — ED PROVIDER NOTES
Independent ROSA M Visit. [facility]  ED  Encounter Note  Admit Date/RoomTime: 2023  3:18 AM  ED Room: [place] [address]  NAME: Jeffrey Vitale  : 1995  MRN: [id number]  PCP: No primary care provider on file. CHIEF COMPLAINT     Exposure to STD    HISTORY OF PRESENT ILLNESS        Jeffrey Vitale is a 32 y.o. female who presents to the ED via private vehicle for STD exposure. States that a partner was positive for chlamydia as well was trichomonas. Denies any symptoms. No nausea vomiting diarrhea. No fever or chills. No abdominal pain. No vaginal discharge or bleeding. No dysuria. REVIEW OF SYSTEMS     Pertinent positives and negatives are stated within HPI, all other systems reviewed and are negative. Past Medical History:  has a past medical history of MDD (major depressive disorder) and STD (female). Surgical History:  has a past surgical history that includes Abdomen surgery;  section (); Dilation and curettage of uterus (2018); and Dilation & curettage. Social History:  reports that she quit smoking about 2 years ago. Her smoking use included cigars. She has a 8.00 pack-year smoking history. She has never used smokeless tobacco. She reports that she does not drink alcohol and does not use drugs. Family History: family history is not on file.    Allergies: Pcn [penicillins]  CURRENT MEDICATIONS       Previous Medications    ACETAMINOPHEN (TYLENOL) 500 MG TABLET    Take 2 tablets by mouth every 6 hours as needed for Pain    FOLIC ACID (FOLVITE) 1 MG TABLET    take 1 tablet by mouth three times a day    IBUPROFEN (ADVIL;MOTRIN) 600 MG TABLET    Take 1 tablet by mouth every 6 hours    PRENATAL MV-MIN-FE FUM-FA-DHA (PRENATAL 1 PO)    Take by mouth       SCREENINGS     Stanley Coma Scale  Eye Opening: Spontaneous  Best Verbal Response: Oriented  Best Motor Response: Obeys commands  [place] Coma Scale Score: 15

## 2023-09-19 LAB
CHLAMYDIA DNA UR QL NAA+PROBE: NEGATIVE
N GONORRHOEA DNA UR QL NAA+PROBE: NEGATIVE
SPECIMEN DESCRIPTION: NORMAL

## 2025-04-02 ENCOUNTER — APPOINTMENT (OUTPATIENT)
Dept: ULTRASOUND IMAGING | Age: 30
End: 2025-04-02

## 2025-04-02 ENCOUNTER — HOSPITAL ENCOUNTER (EMERGENCY)
Age: 30
Discharge: HOME | End: 2025-04-02

## 2025-04-02 VITALS
WEIGHT: 105 LBS | HEIGHT: 62 IN | SYSTOLIC BLOOD PRESSURE: 124 MMHG | DIASTOLIC BLOOD PRESSURE: 80 MMHG | TEMPERATURE: 98.2 F | RESPIRATION RATE: 15 BRPM | OXYGEN SATURATION: 100 % | BODY MASS INDEX: 19.32 KG/M2 | HEART RATE: 109 BPM

## 2025-04-02 DIAGNOSIS — O02.1 MISSED ABORTION: Primary | ICD-10-CM

## 2025-04-02 LAB
ALBUMIN SERPL-MCNC: 3.7 G/DL (ref 3.5–5.2)
ALP SERPL-CCNC: 162 U/L (ref 35–104)
ALT SERPL-CCNC: 54 U/L (ref 0–32)
ANION GAP SERPL CALCULATED.3IONS-SCNC: 9 MMOL/L (ref 7–16)
AST SERPL-CCNC: 41 U/L (ref 0–31)
B-HCG SERPL EIA 3RD IS-ACNC: 228.5 MIU/ML (ref 0–7)
BACTERIA URNS QL MICRO: ABNORMAL
BASOPHILS # BLD: 0.01 K/UL (ref 0–0.2)
BASOPHILS NFR BLD: 0 % (ref 0–2)
BILIRUB SERPL-MCNC: 0.3 MG/DL (ref 0–1.2)
BILIRUB UR QL STRIP: NEGATIVE
BUN SERPL-MCNC: 8 MG/DL (ref 6–20)
CALCIUM SERPL-MCNC: 9.2 MG/DL (ref 8.6–10.2)
CHLORIDE SERPL-SCNC: 105 MMOL/L (ref 98–107)
CLARITY UR: CLEAR
CO2 SERPL-SCNC: 24 MMOL/L (ref 22–29)
COLOR UR: YELLOW
CREAT SERPL-MCNC: 0.4 MG/DL (ref 0.5–1)
EOSINOPHIL # BLD: 0.01 K/UL (ref 0.05–0.5)
EOSINOPHILS RELATIVE PERCENT: 0 % (ref 0–6)
EPI CELLS #/AREA URNS HPF: ABNORMAL /HPF
ERYTHROCYTE [DISTWIDTH] IN BLOOD BY AUTOMATED COUNT: 14.3 % (ref 11.5–15)
GFR, ESTIMATED: >90 ML/MIN/1.73M2
GLUCOSE SERPL-MCNC: 113 MG/DL (ref 74–99)
GLUCOSE UR STRIP-MCNC: NEGATIVE MG/DL
HCG, URINE, POC: POSITIVE
HCT VFR BLD AUTO: 35.7 % (ref 34–48)
HGB BLD-MCNC: 11.5 G/DL (ref 11.5–15.5)
HGB UR QL STRIP.AUTO: ABNORMAL
IMM GRANULOCYTES # BLD AUTO: <0.03 K/UL (ref 0–0.58)
IMM GRANULOCYTES NFR BLD: 0 % (ref 0–5)
KETONES UR STRIP-MCNC: NEGATIVE MG/DL
LEUKOCYTE ESTERASE UR QL STRIP: NEGATIVE
LYMPHOCYTES NFR BLD: 1.42 K/UL (ref 1.5–4)
LYMPHOCYTES RELATIVE PERCENT: 31 % (ref 20–42)
Lab: NORMAL
MAGNESIUM SERPL-MCNC: 1.6 MG/DL (ref 1.6–2.6)
MCH RBC QN AUTO: 25.6 PG (ref 26–35)
MCHC RBC AUTO-ENTMCNC: 32.2 G/DL (ref 32–34.5)
MCV RBC AUTO: 79.3 FL (ref 80–99.9)
MONOCYTES NFR BLD: 0.38 K/UL (ref 0.1–0.95)
MONOCYTES NFR BLD: 8 % (ref 2–12)
NEGATIVE QC PASS/FAIL: NORMAL
NEUTROPHILS NFR BLD: 61 % (ref 43–80)
NEUTS SEG NFR BLD: 2.83 K/UL (ref 1.8–7.3)
NITRITE UR QL STRIP: NEGATIVE
PH UR STRIP: 6 [PH] (ref 5–8)
PLATELET # BLD AUTO: 251 K/UL (ref 130–450)
PMV BLD AUTO: 9.5 FL (ref 7–12)
POSITIVE QC PASS/FAIL: NORMAL
POTASSIUM SERPL-SCNC: 3.8 MMOL/L (ref 3.5–5)
PROT SERPL-MCNC: 6.5 G/DL (ref 6.4–8.3)
PROT UR STRIP-MCNC: NEGATIVE MG/DL
RBC # BLD AUTO: 4.5 M/UL (ref 3.5–5.5)
RBC #/AREA URNS HPF: ABNORMAL /HPF
SODIUM SERPL-SCNC: 138 MMOL/L (ref 132–146)
SP GR UR STRIP: 1.02 (ref 1–1.03)
UROBILINOGEN UR STRIP-ACNC: 0.2 EU/DL (ref 0–1)
WBC #/AREA URNS HPF: ABNORMAL /HPF
WBC OTHER # BLD: 4.7 K/UL (ref 4.5–11.5)

## 2025-04-02 PROCEDURE — 85025 COMPLETE CBC W/AUTO DIFF WBC: CPT

## 2025-04-02 PROCEDURE — 6370000000 HC RX 637 (ALT 250 FOR IP): Performed by: PHYSICIAN ASSISTANT

## 2025-04-02 PROCEDURE — 76817 TRANSVAGINAL US OBSTETRIC: CPT

## 2025-04-02 PROCEDURE — 99284 EMERGENCY DEPT VISIT MOD MDM: CPT

## 2025-04-02 PROCEDURE — 84702 CHORIONIC GONADOTROPIN TEST: CPT

## 2025-04-02 PROCEDURE — 80053 COMPREHEN METABOLIC PANEL: CPT

## 2025-04-02 PROCEDURE — 83735 ASSAY OF MAGNESIUM: CPT

## 2025-04-02 PROCEDURE — 81001 URINALYSIS AUTO W/SCOPE: CPT

## 2025-04-02 RX ORDER — HYDROCODONE BITARTRATE AND ACETAMINOPHEN 5; 325 MG/1; MG/1
1 TABLET ORAL ONCE
Status: COMPLETED | OUTPATIENT
Start: 2025-04-02 | End: 2025-04-02

## 2025-04-02 RX ADMIN — HYDROCODONE BITARTRATE AND ACETAMINOPHEN 1 TABLET: 5; 325 TABLET ORAL at 16:59

## 2025-04-02 ASSESSMENT — PAIN DESCRIPTION - LOCATION
LOCATION: ABDOMEN;BACK
LOCATION: ABDOMEN

## 2025-04-02 ASSESSMENT — PAIN DESCRIPTION - ORIENTATION
ORIENTATION: MID;LOWER
ORIENTATION: RIGHT

## 2025-04-02 ASSESSMENT — PAIN SCALES - GENERAL
PAINLEVEL_OUTOF10: 7
PAINLEVEL_OUTOF10: 10

## 2025-04-02 ASSESSMENT — PAIN DESCRIPTION - DESCRIPTORS
DESCRIPTORS: SHARP
DESCRIPTORS: DISCOMFORT

## 2025-04-02 ASSESSMENT — PAIN - FUNCTIONAL ASSESSMENT: PAIN_FUNCTIONAL_ASSESSMENT: 0-10

## 2025-04-02 NOTE — ED PROVIDER NOTES
Independent ROSA M Visit.     [facility]  Department of Emergency Medicine   ED  Encounter Note  Admit Date/RoomTime: 2025 11:30 AM  ED Room:   NAME: Oziel Ayala  : 1995  MRN: [id number]     Chief Complaint:  Vaginal Bleeding (Vaginal spotting x1 month, positive preg test over the weekend. Unknown LMP, irregular periods since . ) and Abdominal Pain (Lower abd pain, onset yesterday. )    HISTORY OF PRESENT ILLNESS        Oziel Ayala is a 29 y.o. female with a PMH significant for multiparity presents to the ED with complaint of vaginal bleeding and positive pregnancy test.  Patient has an OB history of  7, para 3, miscarriage 3.   x 1.  Patient states she started her menstrual cycle the end of January.  She however has continued spotting almost every day since then.  States she wears a pad just to protect her underwear, but does not really soil the pad.  She just notices the blood on toilet paper when she wipes.  Positive pregnancy test 4 days ago.  Does admit to some mild lower crampy pain.  But also nausea and upper abdominal pain.  She has had a few episodes of vomiting over the past couple months.  Abdominal surgery with  x 1.  Symptoms are mild to moderate in severity.    ROS   Pertinent positives and negatives are stated within HPI, all other systems reviewed and are negative.    Past Medical History:  has a past medical history of MDD (major depressive disorder) and STD (female).    Surgical History:  has a past surgical history that includes Abdomen surgery;  section (2013); Dilation and curettage of uterus (2018); and Dilation & curettage.    Social History:  reports that she quit smoking about 3 years ago. Her smoking use included cigars. She started smoking about 11 years ago. She has a 8 pack-year smoking history. She has never used smokeless tobacco. She reports that she does not drink alcohol and does not use

## 2025-05-12 ENCOUNTER — APPOINTMENT (OUTPATIENT)
Dept: CT IMAGING | Age: 30
End: 2025-05-12
Payer: OTHER MISCELLANEOUS

## 2025-05-12 ENCOUNTER — HOSPITAL ENCOUNTER (EMERGENCY)
Age: 30
Discharge: HOME | End: 2025-05-12
Payer: OTHER MISCELLANEOUS

## 2025-05-12 VITALS
TEMPERATURE: 97.8 F | HEART RATE: 99 BPM | SYSTOLIC BLOOD PRESSURE: 124 MMHG | DIASTOLIC BLOOD PRESSURE: 73 MMHG | OXYGEN SATURATION: 97 % | RESPIRATION RATE: 16 BRPM

## 2025-05-12 DIAGNOSIS — T07.XXXA STRAIN OF MUSCLE OF MULTIPLE SITES: ICD-10-CM
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
## 2025-05-12 DIAGNOSIS — S16.1XXA CERVICAL STRAIN, INITIAL ENCOUNTER: ICD-10-CM

## 2025-05-12 DIAGNOSIS — T07.XXXA ABRASIONS OF MULTIPLE SITES: Primary | ICD-10-CM

## 2025-05-12 LAB
ALBUMIN SERPL-MCNC: 4 G/DL (ref 3.5–5.2)
ALP SERPL-CCNC: 201 U/L (ref 35–104)
ALT SERPL-CCNC: 40 U/L (ref 0–35)
ANION GAP SERPL CALCULATED.3IONS-SCNC: 10 MMOL/L (ref 7–16)
AST SERPL-CCNC: 36 U/L (ref 0–35)
BACTERIA URNS QL MICRO: ABNORMAL
BASOPHILS # BLD: 0.01 K/UL (ref 0–0.2)
BASOPHILS NFR BLD: 0 % (ref 0–2)
BILIRUB SERPL-MCNC: 0.4 MG/DL (ref 0–1.2)
BILIRUB UR QL STRIP: NEGATIVE
BUN SERPL-MCNC: 10 MG/DL (ref 6–20)
CALCIUM SERPL-MCNC: 9.6 MG/DL (ref 8.6–10)
CHLORIDE SERPL-SCNC: 103 MMOL/L (ref 98–107)
CLARITY UR: CLEAR
CO2 SERPL-SCNC: 24 MMOL/L (ref 22–29)
COLOR UR: YELLOW
CREAT SERPL-MCNC: 0.4 MG/DL (ref 0.5–1)
EOSINOPHIL # BLD: 0.06 K/UL (ref 0.05–0.5)
EOSINOPHILS RELATIVE PERCENT: 1 % (ref 0–6)
EPI CELLS #/AREA URNS HPF: ABNORMAL /HPF
ERYTHROCYTE [DISTWIDTH] IN BLOOD BY AUTOMATED COUNT: 14.2 % (ref 11.5–15)
GFR, ESTIMATED: >90 ML/MIN/1.73M2
GLUCOSE SERPL-MCNC: 115 MG/DL (ref 74–99)
GLUCOSE UR STRIP-MCNC: NEGATIVE MG/DL
HCG, URINE, POC: NEGATIVE
HCT VFR BLD AUTO: 38.1 % (ref 34–48)
HGB BLD-MCNC: 12.5 G/DL (ref 11.5–15.5)
HGB UR QL STRIP.AUTO: ABNORMAL
IMM GRANULOCYTES # BLD AUTO: <0.03 K/UL (ref 0–0.58)
IMM GRANULOCYTES NFR BLD: 0 % (ref 0–5)
KETONES UR STRIP-MCNC: NEGATIVE MG/DL
LEUKOCYTE ESTERASE UR QL STRIP: ABNORMAL
LYMPHOCYTES NFR BLD: 2.81 K/UL (ref 1.5–4)
LYMPHOCYTES RELATIVE PERCENT: 55 % (ref 20–42)
Lab: NORMAL
MCH RBC QN AUTO: 25.7 PG (ref 26–35)
MCHC RBC AUTO-ENTMCNC: 32.8 G/DL (ref 32–34.5)
MCV RBC AUTO: 78.2 FL (ref 80–99.9)
MONOCYTES NFR BLD: 0.43 K/UL (ref 0.1–0.95)
MONOCYTES NFR BLD: 8 % (ref 2–12)
NEGATIVE QC PASS/FAIL: NORMAL
NEUTROPHILS NFR BLD: 35 % (ref 43–80)
NEUTS SEG NFR BLD: 1.79 K/UL (ref 1.8–7.3)
NITRITE UR QL STRIP: NEGATIVE
PH UR STRIP: 6 [PH] (ref 5–8)
PLATELET # BLD AUTO: 276 K/UL (ref 130–450)
PMV BLD AUTO: 9.6 FL (ref 7–12)
POSITIVE QC PASS/FAIL: NORMAL
POTASSIUM SERPL-SCNC: 4.1 MMOL/L (ref 3.5–5.1)
PROT SERPL-MCNC: 7.3 G/DL (ref 6.4–8.3)
PROT UR STRIP-MCNC: 30 MG/DL
RBC # BLD AUTO: 4.87 M/UL (ref 3.5–5.5)
RBC #/AREA URNS HPF: ABNORMAL /HPF
SODIUM SERPL-SCNC: 136 MMOL/L (ref 136–145)
SP GR UR STRIP: 1.02 (ref 1–1.03)
UROBILINOGEN UR STRIP-ACNC: 0.2 EU/DL (ref 0–1)
WBC #/AREA URNS HPF: ABNORMAL /HPF
WBC OTHER # BLD: 5.1 K/UL (ref 4.5–11.5)

## 2025-05-12 PROCEDURE — 6370000000 HC RX 637 (ALT 250 FOR IP): Performed by: PHYSICIAN ASSISTANT

## 2025-05-12 PROCEDURE — 6360000004 HC RX CONTRAST MEDICATION: Performed by: RADIOLOGY

## 2025-05-12 PROCEDURE — 99285 EMERGENCY DEPT VISIT HI MDM: CPT

## 2025-05-12 PROCEDURE — 71260 CT THORAX DX C+: CPT

## 2025-05-12 PROCEDURE — 87077 CULTURE AEROBIC IDENTIFY: CPT

## 2025-05-12 PROCEDURE — 72125 CT NECK SPINE W/O DYE: CPT

## 2025-05-12 PROCEDURE — 72128 CT CHEST SPINE W/O DYE: CPT

## 2025-05-12 PROCEDURE — 87086 URINE CULTURE/COLONY COUNT: CPT

## 2025-05-12 PROCEDURE — 80053 COMPREHEN METABOLIC PANEL: CPT

## 2025-05-12 PROCEDURE — 72131 CT LUMBAR SPINE W/O DYE: CPT

## 2025-05-12 PROCEDURE — 81001 URINALYSIS AUTO W/SCOPE: CPT

## 2025-05-12 PROCEDURE — 85025 COMPLETE CBC W/AUTO DIFF WBC: CPT

## 2025-05-12 PROCEDURE — 70450 CT HEAD/BRAIN W/O DYE: CPT

## 2025-05-12 PROCEDURE — 74177 CT ABD & PELVIS W/CONTRAST: CPT

## 2025-05-12 RX ORDER — CYCLOBENZAPRINE HCL 5 MG
5 TABLET ORAL NIGHTLY
Qty: 10 TABLET | Refills: 0 | Status: SHIPPED | OUTPATIENT
Start: 2025-05-12 | End: 2025-05-22

## 2025-05-12 RX ORDER — IBUPROFEN 600 MG/1
600 TABLET, FILM COATED ORAL EVERY 6 HOURS PRN
Qty: 28 TABLET | Refills: 0 | Status: SHIPPED | OUTPATIENT
Start: 2025-05-12 | End: 2025-05-19

## 2025-05-12 RX ORDER — IOPAMIDOL 755 MG/ML
80 INJECTION, SOLUTION INTRAVASCULAR
Status: COMPLETED | OUTPATIENT
Start: 2025-05-12 | End: 2025-05-12

## 2025-05-12 RX ORDER — LIDOCAINE 4 G/G
1 PATCH TOPICAL DAILY
Qty: 10 EACH | Refills: 0 | Status: SHIPPED | OUTPATIENT
Start: 2025-05-12 | End: 2025-05-22

## 2025-05-12 RX ORDER — ACETAMINOPHEN 500 MG
1000 TABLET ORAL ONCE
Status: COMPLETED | OUTPATIENT
Start: 2025-05-12 | End: 2025-05-12

## 2025-05-12 RX ADMIN — IOPAMIDOL 80 ML: 755 INJECTION, SOLUTION INTRAVENOUS at 17:36

## 2025-05-12 RX ADMIN — ACETAMINOPHEN 1000 MG: 500 TABLET ORAL at 18:54

## 2025-05-12 ASSESSMENT — PAIN - FUNCTIONAL ASSESSMENT: PAIN_FUNCTIONAL_ASSESSMENT: 0-10

## 2025-05-12 ASSESSMENT — PAIN SCALES - GENERAL: PAINLEVEL_OUTOF10: 10
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
## 2025-05-13 LAB
MICROORGANISM SPEC CULT: ABNORMAL
SERVICE CMNT-IMP: ABNORMAL
SPECIMEN DESCRIPTION: ABNORMAL

## 2025-07-03 ENCOUNTER — TRANSCRIBE ORDERS (OUTPATIENT)
Dept: ADMINISTRATIVE | Age: 30
End: 2025-07-03

## 2025-07-03 DIAGNOSIS — E04.9 NON-TOXIC GOITER: Primary | ICD-10-CM

## 2025-07-11 ENCOUNTER — HOSPITAL ENCOUNTER (EMERGENCY)
Age: 30
Discharge: LEFT BEFORE DISCHARGE | End: 2025-07-11
Attending: EMERGENCY MEDICINE
Payer: COMMERCIAL

## 2025-07-11 VITALS
OXYGEN SATURATION: 99 % | TEMPERATURE: 97.5 F | HEART RATE: 107 BPM | WEIGHT: 99 LBS | SYSTOLIC BLOOD PRESSURE: 138 MMHG | DIASTOLIC BLOOD PRESSURE: 92 MMHG | BODY MASS INDEX: 18.11 KG/M2 | RESPIRATION RATE: 18 BRPM

## 2025-07-11 DIAGNOSIS — Z53.29 LEFT AGAINST MEDICAL ADVICE: Primary | ICD-10-CM

## 2025-07-11 DIAGNOSIS — S01.81XA FACIAL LACERATION, INITIAL ENCOUNTER: ICD-10-CM

## 2025-07-11 DIAGNOSIS — S09.90XA CLOSED HEAD INJURY, INITIAL ENCOUNTER: ICD-10-CM

## 2025-07-11 PROCEDURE — 90714 TD VACC NO PRESV 7 YRS+ IM: CPT

## 2025-07-11 PROCEDURE — 6360000002 HC RX W HCPCS

## 2025-07-11 PROCEDURE — 6370000000 HC RX 637 (ALT 250 FOR IP)

## 2025-07-11 PROCEDURE — 90471 IMMUNIZATION ADMIN: CPT

## 2025-07-11 PROCEDURE — 99284 EMERGENCY DEPT VISIT MOD MDM: CPT

## 2025-07-11 RX ORDER — ACETAMINOPHEN 500 MG
1000 TABLET ORAL
Status: COMPLETED | OUTPATIENT
Start: 2025-07-11 | End: 2025-07-11

## 2025-07-11 RX ORDER — BACITRACIN ZINC 500 [USP'U]/G
OINTMENT TOPICAL ONCE
Status: COMPLETED | OUTPATIENT
Start: 2025-07-11 | End: 2025-07-11

## 2025-07-11 RX ADMIN — CLOSTRIDIUM TETANI TOXOID ANTIGEN (FORMALDEHYDE INACTIVATED) AND CORYNEBACTERIUM DIPHTHERIAE TOXOID ANTIGEN (FORMALDEHYDE INACTIVATED) 0.5 ML: 5; 2 INJECTION, SUSPENSION INTRAMUSCULAR at 06:19

## 2025-07-11 RX ADMIN — ACETAMINOPHEN 1000 MG: 500 TABLET ORAL at 06:18

## 2025-07-11 RX ADMIN — BACITRACIN ZINC: 500 OINTMENT TOPICAL at 06:21

## 2025-07-11 ASSESSMENT — PAIN SCALES - GENERAL
PAINLEVEL_OUTOF10: 10
PAINLEVEL_OUTOF10: 10

## 2025-07-11 ASSESSMENT — LIFESTYLE VARIABLES
HOW OFTEN DO YOU HAVE A DRINK CONTAINING ALCOHOL: 2-4 TIMES A MONTH
HOW MANY STANDARD DRINKS CONTAINING ALCOHOL DO YOU HAVE ON A TYPICAL DAY: 1 OR 2

## 2025-07-11 ASSESSMENT — PAIN DESCRIPTION - LOCATION: LOCATION: HEAD

## 2025-07-11 ASSESSMENT — PAIN DESCRIPTION - DESCRIPTORS: DESCRIPTORS: DISCOMFORT;NAGGING;THROBBING

## 2025-07-11 ASSESSMENT — PAIN - FUNCTIONAL ASSESSMENT: PAIN_FUNCTIONAL_ASSESSMENT: 0-10

## 2025-07-11 NOTE — DISCHARGE INSTRUCTIONS
Be sure to get stitches removed in approximately 10 days, keep clean, return to the emergency department for any worsening symptoms or concerns.
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
## 2025-07-21 ENCOUNTER — HOSPITAL ENCOUNTER (EMERGENCY)
Age: 30
Discharge: HOME | End: 2025-07-21
Payer: COMMERCIAL

## 2025-07-21 VITALS
HEART RATE: 78 BPM | TEMPERATURE: 96.8 F | RESPIRATION RATE: 16 BRPM | DIASTOLIC BLOOD PRESSURE: 68 MMHG | OXYGEN SATURATION: 98 % | SYSTOLIC BLOOD PRESSURE: 127 MMHG

## 2025-07-21 DIAGNOSIS — Z48.02 VISIT FOR SUTURE REMOVAL: Primary | ICD-10-CM

## 2025-07-21 PROCEDURE — 99282 EMERGENCY DEPT VISIT SF MDM: CPT

## 2025-07-21 ASSESSMENT — PAIN - FUNCTIONAL ASSESSMENT: PAIN_FUNCTIONAL_ASSESSMENT: NONE - DENIES PAIN

## 2025-07-22 NOTE — ED PROVIDER NOTES
Independent ROSA M Visit.       [facility] EMERGENCY DEPARTMENT  ED  Encounter Note  Admit Date/RoomTime: 2025 10:15 PM  ED Room: [place] [address]  NAME: Oziel Ayala  : 1995  MRN: [id number]  PCP: No primary care provider on file.    CHIEF COMPLAINT     Suture / Staple Removal (Patient comes in for stitch removal from R cheek )    HISTORY OF PRESENT ILLNESS        zOiel Ayala is a 29 y.o. female who presents to the ED for suture removal from right cheek.  Placed 11 days ago.  No complication  REVIEW OF SYSTEMS     Pertinent positives and negatives are stated within HPI, all other systems reviewed and are negative.    Past Medical History:  has a past medical history of MDD (major depressive disorder) and STD (female).  Surgical History:  has a past surgical history that includes Abdomen surgery;  section (); Dilation and curettage of uterus (2018); and Dilation & curettage.  Social History:  reports that she quit smoking about 4 years ago. Her smoking use included cigars and cigarettes. She started smoking about 12 years ago. She has a 8 pack-year smoking history. She has never used smokeless tobacco. She reports current drug use. Drug: Marijuana (Weed). She reports that she does not drink alcohol.  Family History: family history is not on file.   Allergies: Pcn [penicillins]  CURRENT MEDICATIONS       Previous Medications    ACETAMINOPHEN (TYLENOL) 500 MG TABLET    Take 2 tablets by mouth every 6 hours as needed for Pain    FOLIC ACID (FOLVITE) 1 MG TABLET    take 1 tablet by mouth three times a day    IBUPROFEN (ADVIL;MOTRIN) 600 MG TABLET    Take 1 tablet by mouth every 6 hours as needed for Pain    PRENATAL MV-MIN-FE FUM-FA-DHA (PRENATAL 1 PO)    Take by mouth       SCREENINGS     Stanley Coma Scale  Eye Opening: Spontaneous  Best Verbal Response: Oriented  Best Motor Response: Obeys commands  [place] Coma Scale Score: 15         CIWA Assessment  BP: